# Patient Record
Sex: MALE | Race: WHITE | NOT HISPANIC OR LATINO | Employment: OTHER | URBAN - NONMETROPOLITAN AREA
[De-identification: names, ages, dates, MRNs, and addresses within clinical notes are randomized per-mention and may not be internally consistent; named-entity substitution may affect disease eponyms.]

---

## 2017-01-05 DIAGNOSIS — N18.2 TYPE 2 DIABETES MELLITUS WITH STAGE 2 CHRONIC KIDNEY DISEASE, WITH LONG-TERM CURRENT USE OF INSULIN (H): Primary | ICD-10-CM

## 2017-01-05 DIAGNOSIS — Z79.4 TYPE 2 DIABETES MELLITUS WITH STAGE 2 CHRONIC KIDNEY DISEASE, WITH LONG-TERM CURRENT USE OF INSULIN (H): Primary | ICD-10-CM

## 2017-01-05 DIAGNOSIS — E11.22 TYPE 2 DIABETES MELLITUS WITH STAGE 2 CHRONIC KIDNEY DISEASE, WITH LONG-TERM CURRENT USE OF INSULIN (H): Primary | ICD-10-CM

## 2017-01-05 NOTE — TELEPHONE ENCOUNTER
Gurvinder has stopped by the clinic to let Dr Burleson know that he received a letter from [facility] stating that as of 2017 they will no longer be covering the Humalog Kwik Pen, however they do cover novalog.    He is going to be checking with the pharmacy to see what the price differences are too, but he is wondering what the process is of changing the prescription and if it can be?  He is wondering if there is a difference between the medications that would matter or if they are very comparable.     He has a couple of weeks left of his Humalog, he just didn't want to wait until the last minute.  Thank you,  Carie GRANT

## 2017-01-05 NOTE — TELEPHONE ENCOUNTER
Humalog and NovoLog are essentially interchangeable. There should be no need to change dosing. When it is time for the new prescription, he should simply call the clinic and we can take care of that.    Thank you    Benjy

## 2017-01-06 PROBLEM — N18.2 TYPE 2 DIABETES MELLITUS WITH STAGE 2 CHRONIC KIDNEY DISEASE, WITH LONG-TERM CURRENT USE OF INSULIN (H): Status: ACTIVE | Noted: 2017-01-06

## 2017-01-06 PROBLEM — Z79.4 TYPE 2 DIABETES MELLITUS WITH STAGE 2 CHRONIC KIDNEY DISEASE, WITH LONG-TERM CURRENT USE OF INSULIN (H): Status: ACTIVE | Noted: 2017-01-06

## 2017-01-06 PROBLEM — E11.22 TYPE 2 DIABETES MELLITUS WITH STAGE 2 CHRONIC KIDNEY DISEASE, WITH LONG-TERM CURRENT USE OF INSULIN (H): Status: ACTIVE | Noted: 2017-01-06

## 2017-01-27 DIAGNOSIS — Z79.4 TYPE 2 DIABETES MELLITUS WITH STAGE 2 CHRONIC KIDNEY DISEASE, WITH LONG-TERM CURRENT USE OF INSULIN (H): Primary | ICD-10-CM

## 2017-01-27 DIAGNOSIS — N18.2 TYPE 2 DIABETES MELLITUS WITH STAGE 2 CHRONIC KIDNEY DISEASE, WITH LONG-TERM CURRENT USE OF INSULIN (H): Primary | ICD-10-CM

## 2017-01-27 DIAGNOSIS — E11.22 TYPE 2 DIABETES MELLITUS WITH STAGE 2 CHRONIC KIDNEY DISEASE, WITH LONG-TERM CURRENT USE OF INSULIN (H): Primary | ICD-10-CM

## 2017-01-30 NOTE — TELEPHONE ENCOUNTER
diabetes         Last Written Prescription Date: 12/26/2016  Last Fill Quantity: 15, # refills: 0  Last Office Visit with G, [facility] or [facility] prescribing provider:  12/27/16        BP Readings from Last 3 Encounters:   12/27/16 114/80   04/26/16 138/70   02/02/16 110/66     MICROL       35   5/29/2015  No results found for this basename: microalbumin  CREATININE   Date Value Ref Range Status   04/26/2016 1.02 0.66 - 1.25 mg/dL Final   ]  GFR ESTIMATE   Date Value Ref Range Status   04/26/2016 71 >60 mL/min/1.7m2 Final     Comment:     Non  GFR Calc   05/29/2015 61 >60 mL/min/1.7m2 Final     Comment:     Non  GFR Calc   11/28/2014 77 >60 mL/min/1.7m2 Final     Comment:     Non  GFR Calc     GFR ESTIMATE IF BLACK   Date Value Ref Range Status   04/26/2016 86 >60 mL/min/1.7m2 Final     Comment:      GFR Calc   05/29/2015 73 >60 mL/min/1.7m2 Final     Comment:      GFR Calc   11/28/2014 >90   GFR Calc   >60 mL/min/1.7m2 Final     CHOL      114   4/26/2016  HDL       33   4/26/2016  LDL       46   4/26/2016  LDL       78   3/7/2014  TRIG      174   4/26/2016  CHOLHDLRATIO      3.4   11/28/2014  AST       27   3/7/2014  ALT       45   3/7/2014  A1C      7.1   4/26/2016  A1C      6.5   10/9/2015  A1C      6.9   5/29/2015  A1C      7.8   11/28/2014  A1C      8.0   7/18/2014  POTASSIUM   Date Value Ref Range Status   04/26/2016 4.5 3.4 - 5.3 mmol/L Final

## 2017-02-01 RX ORDER — INSULIN GLARGINE 100 [IU]/ML
INJECTION, SOLUTION SUBCUTANEOUS
Qty: 15 ML | Refills: 5 | Status: SHIPPED | OUTPATIENT
Start: 2017-02-01 | End: 2017-08-05

## 2017-02-01 NOTE — TELEPHONE ENCOUNTER
Corinne wanted to let you know that Pj will be out of insulin tonight. She will be going into town  this afternoon and she was hoping to pick this up. Please call to notify.

## 2017-02-01 NOTE — TELEPHONE ENCOUNTER
Prescription approved per [facility] Refill Protocol............MADDY Bright

## 2017-04-11 ENCOUNTER — ALLIED HEALTH/NURSE VISIT (OUTPATIENT)
Dept: FAMILY MEDICINE | Facility: OTHER | Age: 77
End: 2017-04-11
Payer: COMMERCIAL

## 2017-04-11 ENCOUNTER — TELEPHONE (OUTPATIENT)
Dept: INTERNAL MEDICINE | Facility: CLINIC | Age: 77
End: 2017-04-11

## 2017-04-11 VITALS — SYSTOLIC BLOOD PRESSURE: 128 MMHG | HEART RATE: 72 BPM | DIASTOLIC BLOOD PRESSURE: 68 MMHG

## 2017-04-11 DIAGNOSIS — E11.22 TYPE 2 DIABETES MELLITUS WITH STAGE 2 CHRONIC KIDNEY DISEASE, WITH LONG-TERM CURRENT USE OF INSULIN (H): ICD-10-CM

## 2017-04-11 DIAGNOSIS — N18.2 TYPE 2 DIABETES MELLITUS WITH STAGE 2 CHRONIC KIDNEY DISEASE, WITH LONG-TERM CURRENT USE OF INSULIN (H): ICD-10-CM

## 2017-04-11 DIAGNOSIS — N18.2 TYPE 2 DIABETES MELLITUS WITH STAGE 2 CHRONIC KIDNEY DISEASE, WITH LONG-TERM CURRENT USE OF INSULIN (H): Primary | ICD-10-CM

## 2017-04-11 DIAGNOSIS — Z01.30 BP CHECK: Primary | ICD-10-CM

## 2017-04-11 DIAGNOSIS — E11.22 TYPE 2 DIABETES MELLITUS WITH STAGE 2 CHRONIC KIDNEY DISEASE, WITH LONG-TERM CURRENT USE OF INSULIN (H): Primary | ICD-10-CM

## 2017-04-11 DIAGNOSIS — I25.10 ATHEROSCLEROSIS OF NATIVE CORONARY ARTERY OF NATIVE HEART WITHOUT ANGINA PECTORIS: ICD-10-CM

## 2017-04-11 DIAGNOSIS — Z79.4 TYPE 2 DIABETES MELLITUS WITH STAGE 2 CHRONIC KIDNEY DISEASE, WITH LONG-TERM CURRENT USE OF INSULIN (H): Primary | ICD-10-CM

## 2017-04-11 DIAGNOSIS — I10 HYPERTENSION GOAL BP (BLOOD PRESSURE) < 140/90: ICD-10-CM

## 2017-04-11 DIAGNOSIS — Z79.4 TYPE 2 DIABETES MELLITUS WITH STAGE 2 CHRONIC KIDNEY DISEASE, WITH LONG-TERM CURRENT USE OF INSULIN (H): ICD-10-CM

## 2017-04-11 LAB
ALBUMIN UR-MCNC: ABNORMAL MG/DL
ANION GAP SERPL CALCULATED.3IONS-SCNC: 9 MMOL/L (ref 3–14)
APPEARANCE UR: CLEAR
BILIRUB UR QL STRIP: NEGATIVE
BUN SERPL-MCNC: 21 MG/DL (ref 7–30)
CALCIUM SERPL-MCNC: 9 MG/DL (ref 8.5–10.1)
CHLORIDE SERPL-SCNC: 106 MMOL/L (ref 94–109)
CHOLEST SERPL-MCNC: 113 MG/DL
CO2 SERPL-SCNC: 27 MMOL/L (ref 20–32)
COLOR UR AUTO: YELLOW
CREAT SERPL-MCNC: 1.14 MG/DL (ref 0.66–1.25)
CREAT UR-MCNC: 108 MG/DL
GFR SERPL CREATININE-BSD FRML MDRD: 62 ML/MIN/1.7M2
GLUCOSE SERPL-MCNC: 126 MG/DL (ref 70–99)
GLUCOSE UR STRIP-MCNC: NEGATIVE MG/DL
HBA1C MFR BLD: 6.9 % (ref 4.3–6)
HDLC SERPL-MCNC: 38 MG/DL
HGB UR QL STRIP: NEGATIVE
KETONES UR STRIP-MCNC: NEGATIVE MG/DL
LDLC SERPL CALC-MCNC: 43 MG/DL
LEUKOCYTE ESTERASE UR QL STRIP: NEGATIVE
MICROALBUMIN UR-MCNC: 47 MG/L
MICROALBUMIN/CREAT UR: 43.61 MG/G CR (ref 0–17)
NITRATE UR QL: NEGATIVE
NONHDLC SERPL-MCNC: 75 MG/DL
PH UR STRIP: 6 PH (ref 5–7)
POTASSIUM SERPL-SCNC: 4.3 MMOL/L (ref 3.4–5.3)
RBC #/AREA URNS AUTO: NORMAL /HPF (ref 0–2)
SODIUM SERPL-SCNC: 142 MMOL/L (ref 133–144)
SP GR UR STRIP: 1.02 (ref 1–1.03)
TRIGL SERPL-MCNC: 158 MG/DL
URN SPEC COLLECT METH UR: ABNORMAL
UROBILINOGEN UR STRIP-ACNC: 0.2 EU/DL (ref 0.2–1)
WBC #/AREA URNS AUTO: NORMAL /HPF (ref 0–2)

## 2017-04-11 PROCEDURE — 36415 COLL VENOUS BLD VENIPUNCTURE: CPT | Performed by: INTERNAL MEDICINE

## 2017-04-11 PROCEDURE — 81001 URINALYSIS AUTO W/SCOPE: CPT | Performed by: INTERNAL MEDICINE

## 2017-04-11 PROCEDURE — 99207 ZZC NO CHARGE NURSE ONLY: CPT

## 2017-04-11 PROCEDURE — 83036 HEMOGLOBIN GLYCOSYLATED A1C: CPT | Performed by: INTERNAL MEDICINE

## 2017-04-11 PROCEDURE — 80061 LIPID PANEL: CPT | Performed by: INTERNAL MEDICINE

## 2017-04-11 PROCEDURE — 80048 BASIC METABOLIC PNL TOTAL CA: CPT | Performed by: INTERNAL MEDICINE

## 2017-04-11 PROCEDURE — 82043 UR ALBUMIN QUANTITATIVE: CPT | Performed by: INTERNAL MEDICINE

## 2017-04-11 NOTE — NURSING NOTE
Pj is a 76 year old male who comes in today for a blood pressure check because of ongoing blood pressure monitoring.  Patient is taking medication as prescribed  Patient is tolerating medications well.  Current complaints: none  Patient is not monitoring Blood Pressure elsewhere.      Vitals as recorded, a large cuff was used.  left arm  BP Readings from Last 4 Encounters:   04/11/17 128/68   12/27/16 114/80   04/26/16 138/70   02/02/16 110/66       Health Maintenance Due   Topic Date Due     PNEUMOCOCCAL (2 of 2 - PCV13) 12/05/2007     EYE EXAM Q1 YEAR( NO INBASKET)  03/01/2016     MICROALBUMIN Q1 YEAR( NO INBASKET)  05/29/2016     COLONOSCOPY Q5 YR INBASKET MESSAGE  06/28/2016     A1C Q6 MO( NO INBASKET)  10/26/2016     FOOT EXAM Q1 YEAR( NO INBASKET)  02/03/2017     BMP Q1 YR (NO INBASKET)  04/26/2017     LIPID MONITORING Q1 YEAR( NO INBASKET)  04/26/2017     Ashley Adhikari MA     4/11/2017

## 2017-04-11 NOTE — MR AVS SNAPSHOT
"              After Visit Summary   2017    Pj Zhao    MRN: [id number]           Patient Information     Date Of Birth          1940        Visit Information        Provider Department      2017 9:30 AM KAITLYNN OWENS NURSE, [facility]        Today's Diagnoses     BP check    -  1       Follow-ups after your visit        Who to contact     If you have questions or need follow up information about today's clinic visit or your schedule please contact [facility] directly at [phone].  Normal or non-critical lab and imaging results will be communicated to you by MyChart, letter or phone within 4 business days after the clinic has received the results. If you do not hear from us within 7 days, please contact the clinic through [employer] or phone. If you have a critical or abnormal lab result, we will notify you by phone as soon as possible.  Submit refill requests through [employer] or call your pharmacy and they will forward the refill request to us. Please allow 3 business days for your refill to be completed.          Additional Information About Your Visit        MyChart Information     [employer] lets you send messages to your doctor, view your test results, renew your prescriptions, schedule appointments and more. To sign up, go to [email or web address] . Click on \"Log in\" on the left side of the screen, which will take you to the Welcome page. Then click on \"Sign up Now\" on the right side of the page.     You will be asked to enter the access code listed below, as well as some personal information. Please follow the directions to create your username and password.     Your access code is: [email or web address]  Expires: 7/10/2017 10:09 AM     Your access code will  in 90 days. If you need help or a new code, please call your [facility] or [phone].        Care EveryWhere ID     This is your Care EveryWhere ID. This could be used by other organizations to access " your [facility] medical records  [id number]        Your Vitals Were     Pulse                   72            Blood Pressure from Last 3 Encounters:   04/11/17 128/68   12/27/16 114/80   04/26/16 138/70    Weight from Last 3 Encounters:   12/27/16 228 lb (103.4 kg)   04/26/16 228 lb (103.4 kg)   02/02/16 227 lb (103 kg)              Today, you had the following     No orders found for display       Primary Care Provider Office Phone # Fax #    Porfirio Burleson [phone]       [address]   [facility] [address]        Thank you!     Thank you for choosing [facility]  for your care. Our goal is always to provide you with excellent care. Hearing back from our patients is one way we can continue to improve our services. Please take a few minutes to complete the written survey that you may receive in the mail after your visit with us. Thank you!             Your Updated Medication List - Protect others around you: Learn how to safely use, store and throw away your medicines at www.disposemymeds.org.          This list is accurate as of: 4/11/17 10:09 AM.  Always use your most recent med list.                   Brand Name Dispense Instructions for use    amLODIPine 5 MG tablet    NORVASC    90 tablet    Take 1 tablet (5 mg) by mouth daily       aspirin 325 MG EC tablet      1 tab po QD (Once per day)       atorvastatin 40 MG tablet    LIPITOR    30 tablet    TAKE ONE TABLET BY MOUTH ONCE DAILY       azithromycin 250 MG tablet    ZITHROMAX    6 tablet    Two tablets first day, then one tablet daily for four days.       blood glucose monitoring test strip    ONE TOUCH ULTRA    1 Box    TEST FOUR TIMES DAILY       diazepam 5 MG tablet    VALIUM    30 tablet    Take 1 tablet (5 mg) by mouth 2 times daily as needed for anxiety       gabapentin 300 MG capsule    NEURONTIN    30 capsule    TAKE ONE CAPSULE BY MOUTH AT BEDTIME AS NEEDED FOR NEUROPATHY        "hydrochlorothiazide 25 MG tablet    HYDRODIURIL    90 tablet    Take 1 tablet (25 mg) by mouth daily       IBUPROFEN PO      Take 600 mg by mouth       Injection Device for insulin Aditi    B-D PEN    100 each    BD Pen Needle Short 31g- 5/16\"       insulin aspart 100 UNIT/ML injection    NovoLOG FLEXPEN    30 mL    15 units before breakfast, 18 units before lunch, 18 units before dinner plus use of med dose sliding scale as directed       ketoconazole 2 % cream    NIZORAL    15 g    APPLY TO AFFECTED AREA(S) TOPICALLY AS DIRECTED       LANTUS SOLOSTAR 100 UNIT/ML injection   Generic drug:  insulin glargine     15 mL    INJECT 50 UNITS SUBCUTANEOUSLY ONCE DAILY       lisinopril 20 MG tablet    PRINIVIL/ZESTRIL    60 tablet    Take 1 tablet (20 mg) by mouth 2 times daily       meclizine 25 MG tablet    ANTIVERT    30 tablet    Take 1 tablet (25 mg) by mouth every 6 hours as needed for dizziness       metFORMIN 500 MG tablet    GLUCOPHAGE    360 tablet    TAKE TWO TABLETS BY MOUTH TWO TIMES A DAY WITH MEALS       metoprolol 50 MG tablet    LOPRESSOR    180 tablet    Take 1 tablet (50 mg) by mouth 2 times daily       nitroglycerin 0.4 MG sublingual tablet    NITROSTAT    25 tablet    Place 1 tablet (0.4 mg) under the tongue See Admin Instructions for chest pain         "

## 2017-04-11 NOTE — TELEPHONE ENCOUNTER
Reason for Call: Request for an order or referral:    Order or referral being requested: patient calling to scheduled lab appt, please place orders for diabetic lab work & patient states he's not sure what all else was needed.  Patient is fasting right now is it possible for orders yet this morning?  Please advise patient asap.    Date needed: as soon as possible    Has the patient been seen by the PCP for this problem? YES    Additional comments:     Phone number Patient can be reached at:  Home number on file [phone] (home)    Best Time:      Can we leave a detailed message on this number?  YES    Call taken on 4/11/2017 at 7:52 AM by Amy Linares

## 2017-04-13 ENCOUNTER — TELEPHONE (OUTPATIENT)
Dept: INTERNAL MEDICINE | Facility: CLINIC | Age: 77
End: 2017-04-13

## 2017-04-13 NOTE — LETTER
[address] [address]  Phone: [phone]          April 13, 2017    Pj Zhao  [address] [facility] [address]          Dear Pj,      LAB RESULTS:     The microalbumin has increased slightly.  The urine sample is unremarkable.  Blood sugar was slightly elevated at 126.  The kidney function is excellent.  Cholesterol is well-controlled with an LDL of 43.  The glycosylated hemoglobin is down from 7.1 down to 6.9.  This suggests very good blood sugar control.          Sincerely,      Porfirio Burleson D.O.

## 2017-04-13 NOTE — TELEPHONE ENCOUNTER
----- Message from Porfirio Burleson DO sent at 4/12/2017  8:32 PM CDT -----    Please contact the patient and notify him of the following:  The microalbumin has increased slightly.  The urine sample is unremarkable.  Blood sugar was slightly elevated at 126.  The kidney function is excellent.  Cholesterol is well-controlled with an LDL of 43.  The glycosylated hemoglobin is down from 7.1 down to 6.9.  This suggests very good blood sugar control.        Thank you.  DO VASU PhillipsOI

## 2017-04-13 NOTE — PROGRESS NOTES
Please contact the patient and notify him of the following:  The microalbumin has increased slightly.  The urine sample is unremarkable.  Blood sugar was slightly elevated at 126.  The kidney function is excellent.  Cholesterol is well-controlled with an LDL of 43.  The glycosylated hemoglobin is down from 7.1 down to 6.9.  This suggests very good blood sugar control.        Thank you.  DO CAREN Phillips

## 2017-04-26 DIAGNOSIS — E78.5 HYPERLIPIDEMIA LDL GOAL <100: ICD-10-CM

## 2017-04-26 DIAGNOSIS — N18.2 TYPE 2 DIABETES MELLITUS WITH STAGE 2 CHRONIC KIDNEY DISEASE, WITHOUT LONG-TERM CURRENT USE OF INSULIN (H): ICD-10-CM

## 2017-04-26 DIAGNOSIS — E11.22 TYPE 2 DIABETES MELLITUS WITH STAGE 2 CHRONIC KIDNEY DISEASE, WITHOUT LONG-TERM CURRENT USE OF INSULIN (H): ICD-10-CM

## 2017-04-26 NOTE — TELEPHONE ENCOUNTER
Metformin         Last Written Prescription Date: 12/27/16  Last Fill Quantity: 360, # refills: 0  Last Office Visit with [facility], [facility] or  Health prescribing provider:  12/27/16        BP Readings from Last 3 Encounters:   04/11/17 128/68   12/27/16 114/80   04/26/16 138/70     Lab Results   Component Value Date    MICROL 47 04/11/2017     Lab Results   Component Value Date    UMALCR 43.61 04/11/2017     Creatinine   Date Value Ref Range Status   04/11/2017 1.14 0.66 - 1.25 mg/dL Final   ]  GFR Estimate   Date Value Ref Range Status   04/11/2017 62 >60 mL/min/1.7m2 Final     Comment:     Non  GFR Calc   04/26/2016 71 >60 mL/min/1.7m2 Final     Comment:     Non  GFR Calc   05/29/2015 61 >60 mL/min/1.7m2 Final     Comment:     Non  GFR Calc     GFR Estimate If Black   Date Value Ref Range Status   04/11/2017 75 >60 mL/min/1.7m2 Final     Comment:      GFR Calc   04/26/2016 86 >60 mL/min/1.7m2 Final     Comment:      GFR Calc   05/29/2015 73 >60 mL/min/1.7m2 Final     Comment:      GFR Calc     Lab Results   Component Value Date    CHOL 113 04/11/2017     Lab Results   Component Value Date    HDL 38 04/11/2017     Lab Results   Component Value Date    LDL 43 04/11/2017     Lab Results   Component Value Date    TRIG 158 04/11/2017     Lab Results   Component Value Date    CHOLHDLRATIO 3.4 11/28/2014     Lab Results   Component Value Date    AST 27 03/07/2014     Lab Results   Component Value Date    ALT 45 03/07/2014     Lab Results   Component Value Date    A1C 6.9 04/11/2017    A1C 7.1 04/26/2016    A1C 6.5 10/09/2015    A1C 6.9 05/29/2015    A1C 7.8 11/28/2014     Potassium   Date Value Ref Range Status   04/11/2017 4.3 3.4 - 5.3 mmol/L Final     Atorvastatin      Last Written Prescription Date: 10/21/16  Last Fill Quantity: 30, # refills: 5  Last Office Visit with [facility], [facility] or  Health prescribing provider: 12/27/16       Lab  Results   Component Value Date    CHOL 113 04/11/2017     Lab Results   Component Value Date    HDL 38 04/11/2017     Lab Results   Component Value Date    LDL 43 04/11/2017     Lab Results   Component Value Date    TRIG 158 04/11/2017     Lab Results   Component Value Date    CHOLHDLRATIO 3.4 11/28/2014

## 2017-04-28 RX ORDER — ATORVASTATIN CALCIUM 40 MG/1
TABLET, FILM COATED ORAL
Qty: 30 TABLET | Refills: 5 | Status: SHIPPED | OUTPATIENT
Start: 2017-04-28 | End: 2017-10-23

## 2017-05-13 DIAGNOSIS — E11.22 TYPE 2 DIABETES MELLITUS WITH STAGE 2 CHRONIC KIDNEY DISEASE, WITHOUT LONG-TERM CURRENT USE OF INSULIN (H): ICD-10-CM

## 2017-05-13 DIAGNOSIS — N18.2 TYPE 2 DIABETES MELLITUS WITH STAGE 2 CHRONIC KIDNEY DISEASE, WITHOUT LONG-TERM CURRENT USE OF INSULIN (H): ICD-10-CM

## 2017-05-15 RX ORDER — GABAPENTIN 300 MG/1
CAPSULE ORAL
Qty: 30 CAPSULE | Refills: 5 | Status: SHIPPED | OUTPATIENT
Start: 2017-05-15 | End: 2017-11-08

## 2017-05-15 NOTE — TELEPHONE ENCOUNTER
Gabapentin (NEURONTIN) 300 MG capsule       Last Written Prescription Date:  11/14/2016  Last Fill Quantity: 30,   # refills: 5  Last Office Visit with [facility], [facility] or  Health prescribing provider: 12/27/2016  Future Office visit:       Routing refill request to provider for review/approval because:  Drug not on the [facility], [facility] or [facility] refill protocol or controlled substance    Alia Barnard CMA

## 2017-05-26 ENCOUNTER — HEALTH MAINTENANCE LETTER (OUTPATIENT)
Age: 77
End: 2017-05-26

## 2017-07-05 DIAGNOSIS — N18.1 CHRONIC KIDNEY DISEASE, STAGE I: ICD-10-CM

## 2017-07-05 RX ORDER — LISINOPRIL 20 MG/1
TABLET ORAL
Qty: 60 TABLET | Refills: 5 | Status: SHIPPED | OUTPATIENT
Start: 2017-07-05 | End: 2018-01-02

## 2017-07-05 NOTE — TELEPHONE ENCOUNTER
lisinopril (PRINIVIL/ZESTRIL) 20 MG tablet      Last Written Prescription Date: 12/27/16  Last Fill Quantity: 60, # refills: 5  Last Office Visit with G, P or [facility] prescribing provider: 12/27/16       Potassium   Date Value Ref Range Status   04/11/2017 4.3 3.4 - 5.3 mmol/L Final     Creatinine   Date Value Ref Range Status   04/11/2017 1.14 0.66 - 1.25 mg/dL Final     BP Readings from Last 3 Encounters:   04/11/17 128/68   12/27/16 114/80   04/26/16 138/70

## 2017-07-18 DIAGNOSIS — I10 HYPERTENSION GOAL BP (BLOOD PRESSURE) < 140/90: ICD-10-CM

## 2017-07-18 DIAGNOSIS — N18.2 TYPE 2 DIABETES MELLITUS WITH STAGE 2 CHRONIC KIDNEY DISEASE, WITHOUT LONG-TERM CURRENT USE OF INSULIN (H): ICD-10-CM

## 2017-07-18 DIAGNOSIS — E11.22 TYPE 2 DIABETES MELLITUS WITH STAGE 2 CHRONIC KIDNEY DISEASE, WITHOUT LONG-TERM CURRENT USE OF INSULIN (H): ICD-10-CM

## 2017-07-18 RX ORDER — METOPROLOL TARTRATE 50 MG
TABLET ORAL
Qty: 180 TABLET | Refills: 1 | Status: SHIPPED | OUTPATIENT
Start: 2017-07-18 | End: 2018-01-19

## 2017-07-18 NOTE — TELEPHONE ENCOUNTER
metoprolol (LOPRESSOR) 50 MG tablet      Last Written Prescription Date: 12/27/16  Last Fill Quantity: 180, # refills: 1    Last Office Visit with G, [facility] or [facility] prescribing provider:  12/27/16   Future Office Visit:        BP Readings from Last 3 Encounters:   04/11/17 128/68   12/27/16 114/80   04/26/16 138/70

## 2017-07-18 NOTE — TELEPHONE ENCOUNTER
metformin         Last Written Prescription Date: 4/28/17  Last Fill Quantity: 360, # refills: 0  Last Office Visit with [facility], [facility] or [facility] prescribing provider:  12/27/16        BP Readings from Last 3 Encounters:   04/11/17 128/68   12/27/16 114/80   04/26/16 138/70     Lab Results   Component Value Date    MICROL 47 04/11/2017     Lab Results   Component Value Date    UMALCR 43.61 04/11/2017     Creatinine   Date Value Ref Range Status   04/11/2017 1.14 0.66 - 1.25 mg/dL Final   ]  GFR Estimate   Date Value Ref Range Status   04/11/2017 62 >60 mL/min/1.7m2 Final     Comment:     Non  GFR Calc   04/26/2016 71 >60 mL/min/1.7m2 Final     Comment:     Non  GFR Calc   05/29/2015 61 >60 mL/min/1.7m2 Final     Comment:     Non  GFR Calc     GFR Estimate If Black   Date Value Ref Range Status   04/11/2017 75 >60 mL/min/1.7m2 Final     Comment:      GFR Calc   04/26/2016 86 >60 mL/min/1.7m2 Final     Comment:      GFR Calc   05/29/2015 73 >60 mL/min/1.7m2 Final     Comment:      GFR Calc     Lab Results   Component Value Date    CHOL 113 04/11/2017     Lab Results   Component Value Date    HDL 38 04/11/2017     Lab Results   Component Value Date    LDL 43 04/11/2017     Lab Results   Component Value Date    TRIG 158 04/11/2017     Lab Results   Component Value Date    CHOLHDLRATIO 3.4 11/28/2014     Lab Results   Component Value Date    AST 27 03/07/2014     Lab Results   Component Value Date    ALT 45 03/07/2014     Lab Results   Component Value Date    A1C 6.9 04/11/2017    A1C 7.1 04/26/2016    A1C 6.5 10/09/2015    A1C 6.9 05/29/2015    A1C 7.8 11/28/2014     Potassium   Date Value Ref Range Status   04/11/2017 4.3 3.4 - 5.3 mmol/L Final

## 2017-07-18 NOTE — TELEPHONE ENCOUNTER
Prescription approved per [facility] Refill Protocol.    Justyna Weaver RN

## 2017-07-22 DIAGNOSIS — B35.4 TINEA CORPORIS: ICD-10-CM

## 2017-07-24 NOTE — TELEPHONE ENCOUNTER
ketoconazole (NIZORAL) 2 % cream      Last Written Prescription Date: 9/20/16  Last Fill Quantity: 15,  # refills: 0   Last Office Visit with [facility], [facility] or [facility] prescribing provider: 12/27/16

## 2017-07-25 RX ORDER — KETOCONAZOLE 20 MG/G
CREAM TOPICAL
Qty: 15 G | Refills: 0 | Status: SHIPPED | OUTPATIENT
Start: 2017-07-25 | End: 2020-10-30

## 2017-07-25 NOTE — TELEPHONE ENCOUNTER
[facility]  Routing refill request to provider for review/approval because:  A break in medication    Hilario Canchola RN, BSN

## 2017-09-20 ENCOUNTER — TELEPHONE (OUTPATIENT)
Dept: FAMILY MEDICINE | Facility: CLINIC | Age: 77
End: 2017-09-20

## 2017-09-20 ENCOUNTER — OFFICE VISIT (OUTPATIENT)
Dept: FAMILY MEDICINE | Facility: OTHER | Age: 77
End: 2017-09-20
Payer: COMMERCIAL

## 2017-09-20 VITALS
HEART RATE: 77 BPM | WEIGHT: 228.9 LBS | SYSTOLIC BLOOD PRESSURE: 112 MMHG | DIASTOLIC BLOOD PRESSURE: 62 MMHG | TEMPERATURE: 97.7 F | BODY MASS INDEX: 33.32 KG/M2 | OXYGEN SATURATION: 96 %

## 2017-09-20 DIAGNOSIS — Z79.4 TYPE 2 DIABETES MELLITUS WITH STAGE 2 CHRONIC KIDNEY DISEASE, WITH LONG-TERM CURRENT USE OF INSULIN (H): ICD-10-CM

## 2017-09-20 DIAGNOSIS — E66.811 OBESITY, CLASS I, BMI 30-34.9: ICD-10-CM

## 2017-09-20 DIAGNOSIS — J01.00 ACUTE NON-RECURRENT MAXILLARY SINUSITIS: ICD-10-CM

## 2017-09-20 DIAGNOSIS — N18.2 TYPE 2 DIABETES MELLITUS WITH STAGE 2 CHRONIC KIDNEY DISEASE, WITH LONG-TERM CURRENT USE OF INSULIN (H): ICD-10-CM

## 2017-09-20 DIAGNOSIS — Z12.11 SPECIAL SCREENING FOR MALIGNANT NEOPLASMS, COLON: Primary | ICD-10-CM

## 2017-09-20 DIAGNOSIS — E11.22 TYPE 2 DIABETES MELLITUS WITH STAGE 2 CHRONIC KIDNEY DISEASE, WITH LONG-TERM CURRENT USE OF INSULIN (H): ICD-10-CM

## 2017-09-20 DIAGNOSIS — H66.003 ACUTE SUPPURATIVE OTITIS MEDIA OF BOTH EARS WITHOUT SPONTANEOUS RUPTURE OF TYMPANIC MEMBRANES, RECURRENCE NOT SPECIFIED: ICD-10-CM

## 2017-09-20 PROCEDURE — 99214 OFFICE O/P EST MOD 30 MIN: CPT | Performed by: INTERNAL MEDICINE

## 2017-09-20 RX ORDER — LORATADINE 10 MG/1
10 TABLET ORAL DAILY
Qty: 30 TABLET | Refills: 1 | Status: SHIPPED | OUTPATIENT
Start: 2017-09-20 | End: 2018-01-30

## 2017-09-20 RX ORDER — AZITHROMYCIN 250 MG/1
TABLET, FILM COATED ORAL
Qty: 6 TABLET | Refills: 0 | Status: SHIPPED | OUTPATIENT
Start: 2017-09-20 | End: 2018-01-30

## 2017-09-20 ASSESSMENT — PAIN SCALES - GENERAL: PAINLEVEL: NO PAIN (0)

## 2017-09-20 NOTE — TELEPHONE ENCOUNTER
I left a message for Gurvinder Zhao.  If he calls back, I am at ext [phone].  If I am not available, he is looking for the following information:    Dermatology  Dr. Piotr Rossi  [address] [place] [address]   Phone [phone]

## 2017-09-20 NOTE — NURSING NOTE
"Chief Complaint   Patient presents with     URI       Initial /62 (BP Location: Left arm, Patient Position: Chair, Cuff Size: Adult Large)  Pulse 77  Temp 97.7  F (36.5  C) (Temporal)  Wt 228 lb 14.4 oz (103.8 kg)  SpO2 96%  BMI 33.32 kg/m2 Estimated body mass index is 33.32 kg/(m^2) as calculated from the following:    Height as of 2/2/16: 5' 9.5\" (1.765 m).    Weight as of this encounter: 228 lb 14.4 oz (103.8 kg).  Medication Reconciliation: complete  Health Maintenance reviewed at today's visit patient asked to schedule/complete:   Colon Cancer:  Patient agrees to schedule   Karli NOLASCO      "

## 2017-09-20 NOTE — PROGRESS NOTES
SUBJECTIVE:   Pj Zhao is a 76 year old male who presents to clinic today for the following health issues:    RESPIRATORY SYMPTOMS      Duration: 1 week    Description  nasal congestion, sore throat and cough    Severity: moderate    Accompanying signs and symptoms: None    History (predisposing factors):  He was in the rain    Precipitating or alleviating factors: None    Therapies tried and outcome:  none    CHIEF COMPLAINT:    The patient is a pleasant 76-year-old gentleman who presents today with nasal congestion, posterior nasal drainage, and a slight cough. He's had this now for about a week. No one else in the household have similar symptoms. He notes that he was out in the rain and in the wind during a tractor pull about a week ago and this is when this all began. He does have a history of type 2 diabetes which has been well-controlled. He is compliant with both his diet and his medication. He is somewhat obese and is working on weight loss as best that he can. He does have a history of hypertension which is currently controlled. He does have some discomfort in his ears, he has some facial pressure primarily in the maxillary area and has posterior nasal drainage which is causing him to have some discomfort in the throat as well as an occasional nonproductive cough.                         PAST, FAMILY,SOCIAL HISTORY:     Medical  History:   has a past medical history of CKD (chronic kidney disease) stage 1, GFR 90 ml/min or greater; CKD (chronic kidney disease) stage 2, GFR 60-89 ml/min (10/26/2015); Diabetic eye exam (H) (10/19/13, 11/25/14); Impotence of organic origin; Microalbuminuria (3/7/2011); NONSPECIFIC MEDICAL HISTORY (1977); Obesity, Class I, BMI 30-34.9 (10/26/2015); Other specified disease of nail; 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) (10/31/2010); Type II or unspecified type diabetes mellitus without mention of complication, not stated as uncontrolled;  Unspecified essential hypertension; and Urinary calculus, unspecified (1990).     Surgical History:   has a past surgical history that includes NONSPECIFIC PROCEDURE; REMOVAL OF TONSILS,<13 Y/O; NONSPECIFIC PROCEDURE (3/11/2003); APPENDECTOMY (3/11/2003); colonoscopy (03/06/07); Colonoscopy (6/28/2011); Phacoemulsification with standard intraocular lens implant (Right, 1/15/2015); and Phacoemulsification with standard intraocular lens implant (Left, 2/19/2015).     Social History:   reports that he has never smoked. He has never used smokeless tobacco. He reports that he does not drink alcohol or use illicit drugs.     Family History:  family history includes CANCER in his sister; DIABETES in his mother; Obesity in his mother.            MEDICATIONS  Current Outpatient Prescriptions   Medication Sig Dispense Refill     azithromycin (ZITHROMAX) 250 MG tablet Two tablets first day, then one tablet daily for four days. 6 tablet 0     loratadine (CLARITIN) 10 MG tablet Take 1 tablet (10 mg) by mouth daily 30 tablet 1     LANTUS SOLOSTAR 100 UNIT/ML soln INJECT 50 UNITS SUBCUTANEOUSLY ONCE DAILY 15 mL 3     ketoconazole (NIZORAL) 2 % cream APPLY TO AFFECTED AREA(S) TOPICALLY AS DIRECTED 15 g 0     metFORMIN (GLUCOPHAGE) 500 MG tablet TAKE TWO TABLETS BY MOUTH TWICE A  tablet 0     metoprolol (LOPRESSOR) 50 MG tablet TAKE ONE TABLET BY MOUTH TWICE A  tablet 1     lisinopril (PRINIVIL/ZESTRIL) 20 MG tablet TAKE ONE TABLET BY MOUTH TWICE A DAY 60 tablet 5     ONE TOUCH ULTRA test strip USE TO FOUR TIMES A  each 11     gabapentin (NEURONTIN) 300 MG capsule TAKE ONE CAPSULE BY MOUTH AT BEDTIME AS NEEDED FOR NEUROPATHY 30 capsule 5     atorvastatin (LIPITOR) 40 MG tablet TAKE ONE TABLET BY MOUTH ONCE DAILY 30 tablet 5     insulin aspart (NOVOLOG FLEXPEN) 100 UNIT/ML injection 15 units before breakfast, 18 units before lunch, 18 units before dinner plus use of med dose sliding scale as directed 30 mL 3      "hydrochlorothiazide (HYDRODIURIL) 25 MG tablet Take 1 tablet (25 mg) by mouth daily 90 tablet 2     amLODIPine (NORVASC) 5 MG tablet Take 1 tablet (5 mg) by mouth daily 90 tablet 2     Injection Device for insulin (B-D PEN) BREANN BD Pen Needle Short 31g- 5/16\" 100 each 8     meclizine (ANTIVERT) 25 MG tablet Take 1 tablet (25 mg) by mouth every 6 hours as needed for dizziness 30 tablet 1     nitroglycerin (NITROSTAT) 0.4 MG SL tablet Place 1 tablet (0.4 mg) under the tongue See Admin Instructions for chest pain 25 tablet 11     IBUPROFEN PO Take 600 mg by mouth       ASPIRIN 325 MG OR TBEC 1 tab po QD (Once per day)           --------------------------------------------------------------------------------------------------------------------                          REVIEW OF SYSTEMS:         LUNGS: Pt denies: Productive cough,excess sputum, hemoptysis, or shortness of breath. Does have occasional dry cough.   HEART: Pt denies: chest pain, arrythmia, syncope, tachy or bradyarrhythmia or excess edema.   GI: Pt denies: nausea, vomitting, diarrhea, constipation, melena, or hematochezia.   NEURO: Pt denies: seizures, strokes, diplopia, weakness, paraesthesias, or paralysis.   SKIN: Pt denies: itching, rashes, discoloration, or specific lesions of concern. Denies recent hair loss.                          EXAMINATION:         /62 (BP Location: Left arm, Patient Position: Chair, Cuff Size: Adult Large)  Pulse 77  Temp 97.7  F (36.5  C) (Temporal)  Wt 228 lb 14.4 oz (103.8 kg)  SpO2 96%  BMI 33.32 kg/m2   Constitutional: The patient appears to be in mild acute distress. The patient appears to be adequately hydrated. No acute respiratory or hemodynamic distress is noted at this time.   LUNGS: clear bilaterally, airflow is brisk, no intercostal retraction or stridor is noted. No coughing is noted during visit.   HEART:  regular without rubs, clicks, gallops, or murmurs. PMI is nondisplaced. Upstrokes are brisk. " S1,S2 are heard.   GI: Abdomen is soft, without rebound, guarding or tenderness. Bowel sounds are appropriate. No renal bruits are heard. Abdomen is obese.   SKIN:  warm and dry. No erythema, or rashes are noted. No specific lesions of concern are noted.   He does have a small area of eczema about the size of a nickel to the left of his umbilical area.              ENT: Nasal mucosa is edematous, scattered yellow exudates are seen. Incomplete obstruction is present. Pharynx is clear of exudates, no significant cervical adenopathy is present. Tympanic membranes show retraction and redness consistent with infection but no evidence of perforation. Thyroid is not nodular or enlarged.                        DECISION MAKIN. Acute suppurative otitis media of both ears without spontaneous rupture of tympanic membranes, recurrence not specified  Tylenol, rest, medications as listed  - azithromycin (ZITHROMAX) 250 MG tablet; Two tablets first day, then one tablet daily for four days.  Dispense: 6 tablet; Refill: 0  - loratadine (CLARITIN) 10 MG tablet; Take 1 tablet (10 mg) by mouth daily  Dispense: 30 tablet; Refill: 1    2. Acute non-recurrent maxillary sinusitis  As above  - azithromycin (ZITHROMAX) 250 MG tablet; Two tablets first day, then one tablet daily for four days.  Dispense: 6 tablet; Refill: 0  - loratadine (CLARITIN) 10 MG tablet; Take 1 tablet (10 mg) by mouth daily  Dispense: 30 tablet; Refill: 1    3. Special screening for malignant neoplasms, colon  Patient will contact and set up at his leisure.  - GASTROENTEROLOGY ADULT REF PROCEDURE ONLY    4. Type 2 diabetes mellitus with stage 2 chronic kidney disease, with long-term current use of insulin (H)  Check fasting blood work in the next couple months    5. Obesity, Class I, BMI 30-34.9  Discussed weight loss her caloric restriction and exercise as tolerated                               FOLLOW UP    I have asked the patient to make an appointment for  follow up with me in a couple months        I have carefully explained the diagnosis and treatment options with the patient. The patient has displayed an understanding of the above, and all subsequent questions were answered.         DO CAREN Phillips    Portions of this note were produced using [employer]  Although every attempt at real-time proof reading has been made, occasional grammar/syntax errors may have been missed.

## 2017-09-20 NOTE — MR AVS SNAPSHOT
After Visit Summary   9/20/2017    Pj Zhao    MRN: [id number]           Patient Information     Date Of Birth          1940        Visit Information        Provider Department      9/20/2017 7:20 AM Porfirio Burleson DO [facility]        Today's Diagnoses     Special screening for malignant neoplasms, colon    -  1    Acute suppurative otitis media of both ears without spontaneous rupture of tympanic membranes, recurrence not specified        Acute non-recurrent maxillary sinusitis        Type 2 diabetes mellitus with stage 2 chronic kidney disease, with long-term current use of insulin (H)        Obesity, Class I, BMI 30-34.9           Follow-ups after your visit        Additional Services     GASTROENTEROLOGY ADULT REF PROCEDURE ONLY       Last Lab Result: Creatinine (mg/dL)       Date                     Value                 04/11/2017               1.14             ----------  Body mass index is 33.32 kg/(m^2).     Needed:  No  Language:  English    Patient will be contacted to schedule procedure.     Please be aware that coverage of these services is subject to the terms and limitations of your health insurance plan.  Call member services at your health plan with any benefit or coverage questions.  Any procedures must be performed at a [facility] facility OR coordinated by your clinic's referral office.    Please bring the following with you to your appointment:    (1) Any X-Rays, CTs or MRIs which have been performed.  Contact the facility where they were done to arrange for  prior to your scheduled appointment.    (2) List of current medications   (3) This referral request   (4) Any documents/labs given to you for this referral                  Who to contact     If you have questions or need follow up information about today's clinic visit or your schedule please contact [facility] directly at [phone].  Normal or  "non-critical lab and imaging results will be communicated to you by MyChart, letter or phone within 4 business days after the clinic has received the results. If you do not hear from us within 7 days, please contact the clinic through [employer] or phone. If you have a critical or abnormal lab result, we will notify you by phone as soon as possible.  Submit refill requests through [employer] or call your pharmacy and they will forward the refill request to us. Please allow 3 business days for your refill to be completed.          Additional Information About Your Visit        [employer] Information     [employer] lets you send messages to your doctor, view your test results, renew your prescriptions, schedule appointments and more. To sign up, go to [email or web address] . Click on \"Log in\" on the left side of the screen, which will take you to the Welcome page. Then click on \"Sign up Now\" on the right side of the page.     You will be asked to enter the access code listed below, as well as some personal information. Please follow the directions to create your username and password.     Your access code is: [email or web address]  Expires: 2017  7:56 AM     Your access code will  in 90 days. If you need help or a new code, please call your [facility] clinic or [phone].        Care EveryWhere ID     This is your Care EveryWhere ID. This could be used by other organizations to access your [facility] medical records  [id number]        Your Vitals Were     Pulse Temperature Pulse Oximetry BMI (Body Mass Index)          77 97.7  F (36.5  C) (Temporal) 96% 33.32 kg/m2         Blood Pressure from Last 3 Encounters:   17 112/62   17 128/68   16 114/80    Weight from Last 3 Encounters:   17 228 lb 14.4 oz (103.8 kg)   16 228 lb (103.4 kg)   16 228 lb (103.4 kg)              We Performed the Following     GASTROENTEROLOGY ADULT REF PROCEDURE ONLY          Today's Medication Changes        "   These changes are accurate as of: 9/20/17  7:56 AM.  If you have any questions, ask your nurse or doctor.               Start taking these medicines.        Dose/Directions    azithromycin 250 MG tablet   Commonly known as:  ZITHROMAX   Used for:  Acute suppurative otitis media of both ears without spontaneous rupture of tympanic membranes, recurrence not specified, Acute non-recurrent maxillary sinusitis   Started by:  Porfirio Burleson DO        Two tablets first day, then one tablet daily for four days.   Quantity:  6 tablet   Refills:  0       loratadine 10 MG tablet   Commonly known as:  CLARITIN   Used for:  Acute suppurative otitis media of both ears without spontaneous rupture of tympanic membranes, recurrence not specified, Acute non-recurrent maxillary sinusitis   Started by:  Porfirio Burleson DO        Dose:  10 mg   Take 1 tablet (10 mg) by mouth daily   Quantity:  30 tablet   Refills:  1            Where to get your medicines      These medications were sent to [address]  [address], [facility] [address]     Phone:  [phone]     azithromycin 250 MG tablet    loratadine 10 MG tablet                Primary Care Provider Office Phone # Fax #    Porfiriovioletta Burleson [phone] [address] [facility]   [facility] [address]        Equal Access to Services     MARSHALL HOPE AH: Hadii jaleesa mello hadasho Soomaali, waaxda luqadaha, qaybta kaalmada adeegyada, waxay preston monk. So [facility] [phone].    ATENCIÓN: Si habla español, tiene a zelaya disposición servicios gratuitos de asistencia lingüística. Llame al [phone].    We comply with applicable federal civil rights laws and [place] laws. We do not discriminate on the basis of race, color, national origin, age, disability sex, sexual orientation or gender identity.            Thank you!     Thank you for choosing [facility]  for your care. Our  "goal is always to provide you with excellent care. Hearing back from our patients is one way we can continue to improve our services. Please take a few minutes to complete the written survey that you may receive in the mail after your visit with us. Thank you!             Your Updated Medication List - Protect others around you: Learn how to safely use, store and throw away your medicines at www.disposemymeds.org.          This list is accurate as of: 9/20/17  7:56 AM.  Always use your most recent med list.                   Brand Name Dispense Instructions for use Diagnosis    amLODIPine 5 MG tablet    NORVASC    90 tablet    Take 1 tablet (5 mg) by mouth daily    Hypertension goal BP (blood pressure) < 140/90       aspirin 325 MG EC tablet      1 tab po QD (Once per day)        atorvastatin 40 MG tablet    LIPITOR    30 tablet    TAKE ONE TABLET BY MOUTH ONCE DAILY    Hyperlipidemia LDL goal <100       azithromycin 250 MG tablet    ZITHROMAX    6 tablet    Two tablets first day, then one tablet daily for four days.    Acute suppurative otitis media of both ears without spontaneous rupture of tympanic membranes, recurrence not specified, Acute non-recurrent maxillary sinusitis       gabapentin 300 MG capsule    NEURONTIN    30 capsule    TAKE ONE CAPSULE BY MOUTH AT BEDTIME AS NEEDED FOR NEUROPATHY    Type 2 diabetes mellitus with stage 2 chronic kidney disease, without long-term current use of insulin (H)       hydrochlorothiazide 25 MG tablet    HYDRODIURIL    90 tablet    Take 1 tablet (25 mg) by mouth daily    Hypertension goal BP (blood pressure) < 140/90       IBUPROFEN PO      Take 600 mg by mouth        Injection Device for insulin Aditi    B-D PEN    100 each    BD Pen Needle Short 31g- 5/16\"    Type 2 diabetes, HbA1C goal < 8% (H)       insulin aspart 100 UNIT/ML injection    NovoLOG FLEXPEN    30 mL    15 units before breakfast, 18 units before lunch, 18 units before dinner plus use of med dose sliding " scale as directed    Type 2 diabetes mellitus with stage 2 chronic kidney disease, with long-term current use of insulin (H)       ketoconazole 2 % cream    NIZORAL    15 g    APPLY TO AFFECTED AREA(S) TOPICALLY AS DIRECTED    Tinea corporis       LANTUS SOLOSTAR 100 UNIT/ML injection   Generic drug:  insulin glargine     15 mL    INJECT 50 UNITS SUBCUTANEOUSLY ONCE DAILY    Type 2 diabetes mellitus with stage 2 chronic kidney disease, with long-term current use of insulin (H)       lisinopril 20 MG tablet    PRINIVIL/ZESTRIL    60 tablet    TAKE ONE TABLET BY MOUTH TWICE A DAY    Chronic kidney disease, stage I       loratadine 10 MG tablet    CLARITIN    30 tablet    Take 1 tablet (10 mg) by mouth daily    Acute suppurative otitis media of both ears without spontaneous rupture of tympanic membranes, recurrence not specified, Acute non-recurrent maxillary sinusitis       meclizine 25 MG tablet    ANTIVERT    30 tablet    Take 1 tablet (25 mg) by mouth every 6 hours as needed for dizziness    BPPV (benign paroxysmal positional vertigo), bilateral       metFORMIN 500 MG tablet    GLUCOPHAGE    360 tablet    TAKE TWO TABLETS BY MOUTH TWICE A DAY    Type 2 diabetes mellitus with stage 2 chronic kidney disease, without long-term current use of insulin (H)       metoprolol 50 MG tablet    LOPRESSOR    180 tablet    TAKE ONE TABLET BY MOUTH TWICE A DAY    Hypertension goal BP (blood pressure) < 140/90       nitroGLYcerin 0.4 MG sublingual tablet    NITROSTAT    25 tablet    Place 1 tablet (0.4 mg) under the tongue See Admin Instructions for chest pain    Chronic ischemic heart disease, unspecified       ONE TOUCH ULTRA test strip   Generic drug:  blood glucose monitoring     100 each    USE TO FOUR TIMES A DAY    Type 2 diabetes mellitus with hyperglycemia (H)

## 2017-09-21 ENCOUNTER — TELEPHONE (OUTPATIENT)
Dept: INTERNAL MEDICINE | Facility: CLINIC | Age: 77
End: 2017-09-21

## 2017-09-21 NOTE — TELEPHONE ENCOUNTER
Left message for patient to return call to schedule colonoscopy or EGD. If Nadege or Marli are unavailable, please transfer to the surgery center.     OK to schedule with JAMES or Won

## 2017-09-21 NOTE — LETTER
Dear Pj,        At [facility] we care about your health and are committed to providing quality patient care, which includes staying current on preventive cancer screenings.  You can increase your chances of finding and treating cancers through regular screenings.     Our records indicate you may be due for the following preventive screening(s):    Colonoscopy    Colonoscopy is recommended every ten years for everyone age 50 and older. We strongly urge our patient's to consider having a colonoscopy done, which is the best screening test available and only needs to be done every 10 years if results are normal. If you are unwilling or unable to have a colonoscopy then we recommend the annual stool testing for blood. This test is called a FIT test and it looks for blood in the stool.       To schedule your colonoscopy, you may contact us by phone at [phone]    If you have had any of the screenings listed above at another facility, please call us so that we may update your chart.          Your [facility] Care Team

## 2017-09-25 DIAGNOSIS — E11.22 TYPE 2 DIABETES MELLITUS WITH STAGE 2 CHRONIC KIDNEY DISEASE, WITH LONG-TERM CURRENT USE OF INSULIN (H): ICD-10-CM

## 2017-09-25 DIAGNOSIS — N18.2 TYPE 2 DIABETES MELLITUS WITH STAGE 2 CHRONIC KIDNEY DISEASE, WITH LONG-TERM CURRENT USE OF INSULIN (H): ICD-10-CM

## 2017-09-25 DIAGNOSIS — Z79.4 TYPE 2 DIABETES MELLITUS WITH STAGE 2 CHRONIC KIDNEY DISEASE, WITH LONG-TERM CURRENT USE OF INSULIN (H): ICD-10-CM

## 2017-09-25 RX ORDER — INSULIN ASPART 100 [IU]/ML
INJECTION, SOLUTION INTRAVENOUS; SUBCUTANEOUS
Qty: 30 ML | Refills: 3 | Status: SHIPPED | OUTPATIENT
Start: 2017-09-25 | End: 2018-06-20

## 2017-09-25 NOTE — TELEPHONE ENCOUNTER
Routing refill request to provider for review/approval because:  Labs out of range:  Microalbumin    Keisha Avendaño, RN  [facility]

## 2017-09-25 NOTE — TELEPHONE ENCOUNTER
Novolog         Last Written Prescription Date: 1/6/17  Last Fill Quantity: 30 mL, # refills: 3  Last Office Visit with [facility] or [facility] prescribing provider:  9/20/17        BP Readings from Last 3 Encounters:   09/20/17 112/62   04/11/17 128/68   12/27/16 114/80     Lab Results   Component Value Date    MICROL 47 04/11/2017     Lab Results   Component Value Date    UMALCR 43.61 04/11/2017     Creatinine   Date Value Ref Range Status   04/11/2017 1.14 0.66 - 1.25 mg/dL Final   ]  GFR Estimate   Date Value Ref Range Status   04/11/2017 62 >60 mL/min/1.7m2 Final     Comment:     Non  GFR Calc   04/26/2016 71 >60 mL/min/1.7m2 Final     Comment:     Non  GFR Calc   05/29/2015 61 >60 mL/min/1.7m2 Final     Comment:     Non  GFR Calc     GFR Estimate If Black   Date Value Ref Range Status   04/11/2017 75 >60 mL/min/1.7m2 Final     Comment:      GFR Calc   04/26/2016 86 >60 mL/min/1.7m2 Final     Comment:      GFR Calc   05/29/2015 73 >60 mL/min/1.7m2 Final     Comment:      GFR Calc     Lab Results   Component Value Date    CHOL 113 04/11/2017     Lab Results   Component Value Date    HDL 38 04/11/2017     Lab Results   Component Value Date    LDL 43 04/11/2017     Lab Results   Component Value Date    TRIG 158 04/11/2017     Lab Results   Component Value Date    CHOLHDLRATIO 3.4 11/28/2014     Lab Results   Component Value Date    AST 27 03/07/2014     Lab Results   Component Value Date    ALT 45 03/07/2014     Lab Results   Component Value Date    A1C 6.9 04/11/2017    A1C 7.1 04/26/2016    A1C 6.5 10/09/2015    A1C 6.9 05/29/2015    A1C 7.8 11/28/2014     Potassium   Date Value Ref Range Status   04/11/2017 4.3 3.4 - 5.3 mmol/L Final

## 2017-09-27 NOTE — TELEPHONE ENCOUNTER
Left message for patient to return call to schedule EGD/colonoscopy. If Marli or Nadege are not available, please transfer to same day surgery        X3, letter sent

## 2017-10-04 NOTE — TELEPHONE ENCOUNTER
Patient called back and info was given.  Thank you,  Chelo Villaseñor   for [facility]

## 2017-10-23 DIAGNOSIS — E78.5 HYPERLIPIDEMIA LDL GOAL <100: ICD-10-CM

## 2017-10-24 RX ORDER — ATORVASTATIN CALCIUM 40 MG/1
TABLET, FILM COATED ORAL
Qty: 30 TABLET | Refills: 5 | Status: SHIPPED | OUTPATIENT
Start: 2017-10-24 | End: 2018-04-19

## 2017-10-24 NOTE — TELEPHONE ENCOUNTER
Routing refill request to provider for review/approval because:  Labs out of range:  BALJIT Avendaño RN  [facility]

## 2017-11-08 DIAGNOSIS — N18.2 TYPE 2 DIABETES MELLITUS WITH STAGE 2 CHRONIC KIDNEY DISEASE, WITHOUT LONG-TERM CURRENT USE OF INSULIN (H): ICD-10-CM

## 2017-11-08 DIAGNOSIS — E11.22 TYPE 2 DIABETES MELLITUS WITH STAGE 2 CHRONIC KIDNEY DISEASE, WITHOUT LONG-TERM CURRENT USE OF INSULIN (H): ICD-10-CM

## 2017-11-08 DIAGNOSIS — I10 HYPERTENSION GOAL BP (BLOOD PRESSURE) < 140/90: ICD-10-CM

## 2017-11-08 RX ORDER — GABAPENTIN 300 MG/1
CAPSULE ORAL
Qty: 30 CAPSULE | Refills: 5 | Status: SHIPPED | OUTPATIENT
Start: 2017-11-08 | End: 2018-05-14

## 2017-11-08 RX ORDER — HYDROCHLOROTHIAZIDE 25 MG/1
TABLET ORAL
Qty: 90 TABLET | Refills: 1 | Status: SHIPPED | OUTPATIENT
Start: 2017-11-08 | End: 2019-01-11

## 2017-11-08 NOTE — TELEPHONE ENCOUNTER
Gabapentin  Routing refill request to provider for review/approval because:  Drug not on the [facility] refill protocol     HCTZ  Prescription approved per [facility] Refill Protocol.    Keisha Avendaño RN  [facility]

## 2017-11-08 NOTE — TELEPHONE ENCOUNTER
Requested Prescriptions   Pending Prescriptions Disp Refills     gabapentin (NEURONTIN) 300 MG capsule [Pharmacy Med Name: GABAPENTIN 300MG CAPS] 30 capsule 5     Sig: TAKE ONE CAPSULE BY MOUTH AT BEDTIME AS NEEDED FOR NEUROPATHY    There is no refill protocol information for this order        hydrochlorothiazide (HYDRODIURIL) 25 MG tablet [Pharmacy Med Name: HYDROCHLOROTHIAZIDE 25MG TABS] 90 tablet 2     Sig: TAKE ONE TABLET BY MOUTH ONCE DAILY    Diuretics (Including Combos) Protocol Passed    11/8/2017  9:39 AM       Passed - Blood pressure under 140/90    BP Readings from Last 3 Encounters:   09/20/17 112/62   04/11/17 128/68   12/27/16 114/80                Passed - Recent or future visit with authorizing provider's specialty    Patient had office visit in the last year or has a visit in the next 30 days with authorizing provider.  See chart review.              Passed - Patient is age 18 or older       Passed - Normal serum creatinine on file in past 12 months    Recent Labs   Lab Test  04/11/17   0909   CR  1.14             Passed - Normal serum potassium on file in past 12 months    Recent Labs   Lab Test  04/11/17   0909   POTASSIUM  4.3                   Passed - Normal serum sodium on file in past 12 months    Recent Labs   Lab Test  04/11/17   0909   NA  142

## 2017-12-10 DIAGNOSIS — Z79.4 TYPE 2 DIABETES MELLITUS WITH STAGE 2 CHRONIC KIDNEY DISEASE, WITH LONG-TERM CURRENT USE OF INSULIN (H): ICD-10-CM

## 2017-12-10 DIAGNOSIS — N18.2 TYPE 2 DIABETES MELLITUS WITH STAGE 2 CHRONIC KIDNEY DISEASE, WITH LONG-TERM CURRENT USE OF INSULIN (H): ICD-10-CM

## 2017-12-10 DIAGNOSIS — E11.22 TYPE 2 DIABETES MELLITUS WITH STAGE 2 CHRONIC KIDNEY DISEASE, WITH LONG-TERM CURRENT USE OF INSULIN (H): ICD-10-CM

## 2017-12-11 NOTE — TELEPHONE ENCOUNTER
Requested Prescriptions   Pending Prescriptions Disp Refills     LANTUS SOLOSTAR 100 UNIT/ML soln [Pharmacy Med Name: LANTUS SOLOSTAR 100UNIT/ML SOPN] 15 mL 3     Sig: INJECT 50 UNITS SUBCUTANEOUSLY ONCE DAILY    Long Acting Insulin Protocol Failed    12/10/2017  1:59 PM       Failed - HgbA1C in past 3 or 6 months    Recent Labs   Lab Test  04/11/17   0909   A1C  6.9*            Passed - Blood pressure less than 140/90 in past 6 months    BP Readings from Last 3 Encounters:   09/20/17 112/62   04/11/17 128/68   12/27/16 114/80                Passed - LDL on file in past 12 months    Recent Labs   Lab Test  04/11/17   0909   LDL  43            Passed - Microalbumin on file in past 12 months    Recent Labs   Lab Test  04/11/17   0920   MICROL  47   UMALCR  43.61*            Passed - Serum creatinine on file in past 12 months    Recent Labs   Lab Test  04/11/17   0909   CR  1.14            Passed - Patient is age 18 or older       Passed - Recent visit with authorizing provider's specialty in past 6 months    Patient had office visit in the last 6 months or has a visit in the next 30 days with authorizing provider.  See chart review.

## 2017-12-12 RX ORDER — INSULIN GLARGINE 100 [IU]/ML
INJECTION, SOLUTION SUBCUTANEOUS
Qty: 15 ML | Refills: 3 | Status: SHIPPED | OUTPATIENT
Start: 2017-12-12 | End: 2018-04-09

## 2017-12-12 NOTE — TELEPHONE ENCOUNTER
Routing refill request to provider for review/approval because:  Labs out of range:  Microalbumin  Labs not current:  A1C    Keisha Avendaño, RN  [facility]

## 2018-01-02 DIAGNOSIS — N18.1 CHRONIC KIDNEY DISEASE, STAGE I: ICD-10-CM

## 2018-01-02 RX ORDER — LISINOPRIL 20 MG/1
TABLET ORAL
Qty: 60 TABLET | Refills: 5 | Status: SHIPPED | OUTPATIENT
Start: 2018-01-02 | End: 2018-07-16

## 2018-01-02 NOTE — TELEPHONE ENCOUNTER
Prescription approved per [facility] Refill Protocol.    Keisha Avendaño RN  [facility]

## 2018-01-02 NOTE — TELEPHONE ENCOUNTER
Requested Prescriptions   Pending Prescriptions Disp Refills     lisinopril (PRINIVIL/ZESTRIL) 20 MG tablet [Pharmacy Med Name: LISINOPRIL 20MG TABS] 60 tablet 5     Sig: TAKE ONE TABLET BY MOUTH TWICE A DAY    ACE Inhibitors (Including Combos) Protocol Passed    1/2/2018  9:15 AM       Passed - Blood pressure under 140/90    BP Readings from Last 3 Encounters:   09/20/17 112/62   04/11/17 128/68   12/27/16 114/80                Passed - Recent or future visit with authorizing provider's specialty    Patient had office visit in the last year or has a visit in the next 30 days with authorizing provider.  See chart review.              Passed - Patient is age 18 or older       Passed - Normal serum creatinine on file in past 12 months    Recent Labs   Lab Test  04/11/17   0909   CR  1.14            Passed - Normal serum potassium on file in past 12 months    Recent Labs   Lab Test  04/11/17   0909   POTASSIUM  4.3

## 2018-01-19 DIAGNOSIS — I10 HYPERTENSION GOAL BP (BLOOD PRESSURE) < 140/90: ICD-10-CM

## 2018-01-19 RX ORDER — METOPROLOL TARTRATE 50 MG
TABLET ORAL
Qty: 180 TABLET | Refills: 1 | Status: SHIPPED | OUTPATIENT
Start: 2018-01-19 | End: 2018-07-23

## 2018-01-19 NOTE — TELEPHONE ENCOUNTER
Prescription approved per [facility] Refill Protocol.    Keisha Avendaño RN  [facility]

## 2018-01-19 NOTE — TELEPHONE ENCOUNTER
"Requested Prescriptions   Pending Prescriptions Disp Refills     metoprolol tartrate (LOPRESSOR) 50 MG tablet [Pharmacy Med Name: METOPROLOL TARTRATE 50MG TABS] 180 tablet 1     Sig: TAKE ONE TABLET BY MOUTH TWICE DAILY.    Beta-Blockers Protocol Passed    1/19/2018  9:10 AM       Passed - Blood pressure under 140/90    BP Readings from Last 3 Encounters:   09/20/17 112/62   04/11/17 128/68   12/27/16 114/80                Passed - Patient is age 6 or older       Passed - Recent or future visit with authorizing provider's specialty    Patient had office visit in the last year or has a visit in the next 30 days with authorizing provider.  See \"Patient Info\" tab in inbasket, or \"Choose Columns\" in Meds & Orders section of the refill encounter.               Last Written Prescription Date:  7/18/17  Last Fill Quantity: 180,  # refills: 1   Last Office Visit with [facility] or [facility] prescribing provider:  9-20-17   Future Office Visit:       "

## 2018-01-30 ENCOUNTER — OFFICE VISIT (OUTPATIENT)
Dept: FAMILY MEDICINE | Facility: OTHER | Age: 78
End: 2018-01-30
Payer: COMMERCIAL

## 2018-01-30 VITALS
WEIGHT: 226.8 LBS | TEMPERATURE: 97.6 F | SYSTOLIC BLOOD PRESSURE: 120 MMHG | HEART RATE: 68 BPM | DIASTOLIC BLOOD PRESSURE: 62 MMHG | RESPIRATION RATE: 24 BRPM | BODY MASS INDEX: 33.01 KG/M2

## 2018-01-30 DIAGNOSIS — E11.22 TYPE 2 DIABETES MELLITUS WITH STAGE 2 CHRONIC KIDNEY DISEASE, WITH LONG-TERM CURRENT USE OF INSULIN (H): ICD-10-CM

## 2018-01-30 DIAGNOSIS — Z79.4 TYPE 2 DIABETES MELLITUS WITH STAGE 2 CHRONIC KIDNEY DISEASE, WITH LONG-TERM CURRENT USE OF INSULIN (H): ICD-10-CM

## 2018-01-30 DIAGNOSIS — C18.9 MALIGNANT NEOPLASM OF COLON, UNSPECIFIED PART OF COLON (H): ICD-10-CM

## 2018-01-30 DIAGNOSIS — E53.8 VITAMIN B12 DEFICIENCY (NON ANEMIC): ICD-10-CM

## 2018-01-30 DIAGNOSIS — N18.2 TYPE 2 DIABETES MELLITUS WITH STAGE 2 CHRONIC KIDNEY DISEASE, WITH LONG-TERM CURRENT USE OF INSULIN (H): ICD-10-CM

## 2018-01-30 DIAGNOSIS — G62.9 PERIPHERAL POLYNEUROPATHY: ICD-10-CM

## 2018-01-30 DIAGNOSIS — L98.9 SKIN LESION: ICD-10-CM

## 2018-01-30 DIAGNOSIS — J06.9 VIRAL UPPER RESPIRATORY TRACT INFECTION: Primary | ICD-10-CM

## 2018-01-30 LAB
HBA1C MFR BLD: 6.1 % (ref 4.3–6)
VIT B12 SERPL-MCNC: 230 PG/ML (ref 193–986)

## 2018-01-30 PROCEDURE — 82607 VITAMIN B-12: CPT | Performed by: FAMILY MEDICINE

## 2018-01-30 PROCEDURE — 83036 HEMOGLOBIN GLYCOSYLATED A1C: CPT | Performed by: FAMILY MEDICINE

## 2018-01-30 PROCEDURE — 99214 OFFICE O/P EST MOD 30 MIN: CPT | Performed by: FAMILY MEDICINE

## 2018-01-30 PROCEDURE — 36415 COLL VENOUS BLD VENIPUNCTURE: CPT | Performed by: FAMILY MEDICINE

## 2018-01-30 RX ORDER — AMOXICILLIN AND CLAVULANATE POTASSIUM 500; 125 MG/1; MG/1
1 TABLET, FILM COATED ORAL 2 TIMES DAILY
Qty: 20 TABLET | Refills: 0 | Status: SHIPPED | OUTPATIENT
Start: 2018-01-30 | End: 2018-05-02

## 2018-01-30 ASSESSMENT — PAIN SCALES - GENERAL: PAINLEVEL: NO PAIN (0)

## 2018-01-30 NOTE — PROGRESS NOTES
SUBJECTIVE:   Pj Zhao is a 77 year old male who presents to clinic today for the following health issues:      RESPIRATORY SYMPTOMS      Duration: 3 days    Description  nasal congestion, rhinorrhea, cough and chills    Severity: moderate    Accompanying signs and symptoms: None    History (predisposing factors):  none    Precipitating or alleviating factors: None    Therapies tried and outcome:  rest and fluids OTC NSAID guaifenesin      SUBJECTIVE:    Pj is a 77 year old male presenting with uri complaints as noted above.    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     Obesity, Class I, BMI 30-34.9 10/26/2015     Other specified disease of nail     toenails - both feet     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) 10/31/2010     Type II or unspecified type diabetes mellitus without mention of complication, not stated as uncontrolled      Unspecified essential hypertension      Urinary calculus, unspecified 1990    left renal calculus       Current Outpatient Prescriptions   Medication Sig Dispense Refill     amoxicillin-clavulanate (AUGMENTIN) 500-125 MG per tablet Take 1 tablet by mouth 2 times daily 20 tablet 0     metoprolol tartrate (LOPRESSOR) 50 MG tablet TAKE ONE TABLET BY MOUTH TWICE DAILY. 180 tablet 1     B-D U/F 31G X 8 MM insulin pen needle AS DIRECTED 100 each 8     lisinopril (PRINIVIL/ZESTRIL) 20 MG tablet TAKE ONE TABLET BY MOUTH TWICE A DAY 60 tablet 5     LANTUS SOLOSTAR 100 UNIT/ML soln INJECT 50 UNITS SUBCUTANEOUSLY ONCE DAILY 15 mL 3     gabapentin (NEURONTIN) 300 MG capsule TAKE ONE  CAPSULE BY MOUTH AT BEDTIME AS NEEDED FOR NEUROPATHY 30 capsule 5     hydrochlorothiazide (HYDRODIURIL) 25 MG tablet TAKE ONE TABLET BY MOUTH ONCE DAILY 90 tablet 1     amLODIPine (NORVASC) 5 MG tablet TAKE ONE TABLET BY MOUTH ONCE DAILY 90 tablet 3     metFORMIN (GLUCOPHAGE) 500 MG tablet TAKE TWO TABLETS BY MOUTH TWICE A  tablet 1     atorvastatin (LIPITOR) 40 MG tablet TAKE ONE TABLET BY MOUTH ONCE DAILY 30 tablet 5     NOVOLOG FLEXPEN 100 UNIT/ML soln TAKE 15 UNITS BEFORE BREAKFAST, 18 UNITS BEFORE LUNCH AND 18 UNITS BEFORE DINNER PLUS USE OF MED DOSE SLIDING SCALE AS DIRECTED 30 mL 3     ketoconazole (NIZORAL) 2 % cream APPLY TO AFFECTED AREA(S) TOPICALLY AS DIRECTED 15 g 0     meclizine (ANTIVERT) 25 MG tablet Take 1 tablet (25 mg) by mouth every 6 hours as needed for dizziness 30 tablet 1     nitroglycerin (NITROSTAT) 0.4 MG SL tablet Place 1 tablet (0.4 mg) under the tongue See Admin Instructions for chest pain 25 tablet 11     IBUPROFEN PO Take 600 mg by mouth       ASPIRIN 325 MG OR TBEC 1 tab po QD (Once per day)       ONE TOUCH ULTRA test strip USE TO FOUR TIMES A  each 11       OBJECTIVE:  /62  Pulse 68  Temp 97.6  F (36.4  C) (Temporal)  Resp 24  Wt 226 lb 12.8 oz (102.9 kg)  BMI 33.01 kg/m2    General appearance: alert and in no apparent distress  Skin color is pink  Hydration status appears adequate with normal skin turgor and moist mucous membranes.    HEENT:     Left TM is normal: no effusions, no erythema, and normal landmarks.  Right TM is normal: no effusions, no erythema, and normal landmarks.  Oropharyngeal exam is normal: no lesions, erythema, adenopathy or exudate.  Neck is supple with no adenopathy    CARDIAC:NORMAL - regular rate and rhythm without murmur.  RESP: Normal - Clear to auscultation without rales, rhonchi, or wheezing.  ABDOMEN: ne      ASSESSMENT:  Viral upper respiratory illness    PLAN:  Fluids, Rest, OTC cough suppressant/expectorant and rx of  augmentin given to use if not improved in 5 dd or so, in light of recurrent sinusitis and other medical problems.      ADDENDA:    (J06.9,  B97.89) Viral upper respiratory tract infection  (primary encounter diagnosis)  Comment: see above  Plan: amoxicillin-clavulanate (AUGMENTIN) 500-125 MG         per tablet            (E11.22,  N18.2,  Z79.4) Type 2 diabetes mellitus with stage 2 chronic kidney disease, with long-term current use of insulin (H)  Comment: due for a1c  Plan: Hemoglobin A1c            (G62.9) Peripheral polyneuropathy  Comment: has not had a b12 level  Plan: Vitamin B12            (L98.9) Skin lesion  Comment: persistent scaly lesion joselo RLQ, no imp with several different ointments  Plan: ret for bx    (C18.9) Malignant neoplasm of colon, unspecified part of colon (H), hx of  Comment: due for colonoscopy    dbue       ADD: b12 sl low       Recommended po b12 and cooper in 2 months

## 2018-01-30 NOTE — NURSING NOTE
"Chief Complaint   Patient presents with     Cough     x's 3 days       Initial /62  Pulse 68  Temp 97.6  F (36.4  C) (Temporal)  Resp 24  Wt 226 lb 12.8 oz (102.9 kg)  BMI 33.01 kg/m2 Estimated body mass index is 33.01 kg/(m^2) as calculated from the following:    Height as of 2/2/16: 5' 9.5\" (1.765 m).    Weight as of this encounter: 226 lb 12.8 oz (102.9 kg).  Medication Reconciliation: complete  "

## 2018-01-30 NOTE — MR AVS SNAPSHOT
"              After Visit Summary   1/30/2018    Pj Zhao    MRN: [id number]           Patient Information     Date Of Birth          1940        Visit Information        Provider Department      1/30/2018 2:30 PM Jamil Cazares MD [facility]        Today's Diagnoses     Viral upper respiratory tract infection    -  1    Type 2 diabetes mellitus with stage 2 chronic kidney disease, with long-term current use of insulin (H)        Peripheral polyneuropathy        Skin lesion        Malignant neoplasm of colon, unspecified part of colon (H), hx of           Follow-ups after your visit        Who to contact     If you have questions or need follow up information about today's clinic visit or your schedule please contact [facility] directly at [phone].  Normal or non-critical lab and imaging results will be communicated to you by [employer], letter or phone within 4 business days after the clinic has received the results. If you do not hear from us within 7 days, please contact the clinic through [employer] or phone. If you have a critical or abnormal lab result, we will notify you by phone as soon as possible.  Submit refill requests through [employer] or call your pharmacy and they will forward the refill request to us. Please allow 3 business days for your refill to be completed.          Additional Information About Your Visit        [employer] Information     [employer] lets you send messages to your doctor, view your test results, renew your prescriptions, schedule appointments and more. To sign up, go to [email or web address] . Click on \"Log in\" on the left side of the screen, which will take you to the Welcome page. Then click on \"Sign up Now\" on the right side of the page.     You will be asked to enter the access code listed below, as well as some personal information. Please follow the directions to create your username and password.     Your access code is: " [email or web address]  Expires: 2018  3:03 PM     Your access code will  in 90 days. If you need help or a new code, please call your [place] clinic or [phone].        Care EveryWhere ID     This is your Care EveryWhere ID. This could be used by other organizations to access your [place] medical records  [id number]        Your Vitals Were     Pulse Temperature Respirations BMI (Body Mass Index)          68 97.6  F (36.4  C) (Temporal) 24 33.01 kg/m2         Blood Pressure from Last 3 Encounters:   18 120/62   17 112/62   17 128/68    Weight from Last 3 Encounters:   18 226 lb 12.8 oz (102.9 kg)   17 228 lb 14.4 oz (103.8 kg)   16 228 lb (103.4 kg)              We Performed the Following     Hemoglobin A1c     Vitamin B12          Today's Medication Changes          These changes are accurate as of 18  3:07 PM.  If you have any questions, ask your nurse or doctor.               Start taking these medicines.        Dose/Directions    amoxicillin-clavulanate 500-125 MG per tablet   Commonly known as:  AUGMENTIN   Used for:  Viral upper respiratory tract infection   Started by:  Jamil Cazares MD        Dose:  1 tablet   Take 1 tablet by mouth 2 times daily   Quantity:  20 tablet   Refills:  0            Where to get your medicines      Some of these will need a paper prescription and others can be bought over the counter.  Ask your nurse if you have questions.     Bring a paper prescription for each of these medications     amoxicillin-clavulanate 500-125 MG per tablet                Primary Care Provider Office Phone # Fax #    Porfirio Stephan Burleson [phone]       [address] [place] [address]        Equal Access to Services     [facility]: Conrad Martinez, waraquel buenrostro, qaanson kaalisidoro bejarano, mimi monk. So [facility] [phone].    ATENCIÓN: Si habla español, tiene a zelaya disposición  servicios gratuitos de asistencia lingüística. Vanessa castro [phone].    We comply with applicable federal civil rights laws and [place] laws. We do not discriminate on the basis of race, color, national origin, age, disability, sex, sexual orientation, or gender identity.            Thank you!     Thank you for choosing [facility]  for your care. Our goal is always to provide you with excellent care. Hearing back from our patients is one way we can continue to improve our services. Please take a few minutes to complete the written survey that you may receive in the mail after your visit with us. Thank you!             Your Updated Medication List - Protect others around you: Learn how to safely use, store and throw away your medicines at www.disposemymeds.org.          This list is accurate as of 1/30/18  3:07 PM.  Always use your most recent med list.                   Brand Name Dispense Instructions for use Diagnosis    amLODIPine 5 MG tablet    NORVASC    90 tablet    TAKE ONE TABLET BY MOUTH ONCE DAILY    Hypertension goal BP (blood pressure) < 140/90       amoxicillin-clavulanate 500-125 MG per tablet    AUGMENTIN    20 tablet    Take 1 tablet by mouth 2 times daily    Viral upper respiratory tract infection       aspirin 325 MG EC tablet      1 tab po QD (Once per day)        atorvastatin 40 MG tablet    LIPITOR    30 tablet    TAKE ONE TABLET BY MOUTH ONCE DAILY    Hyperlipidemia LDL goal <100       B-D U/F 31G X 8 MM   Generic drug:  insulin pen needle     100 each    AS DIRECTED    Type 2 diabetes mellitus with complication, unspecified long term insulin use status (H)       gabapentin 300 MG capsule    NEURONTIN    30 capsule    TAKE ONE CAPSULE BY MOUTH AT BEDTIME AS NEEDED FOR NEUROPATHY    Type 2 diabetes mellitus with stage 2 chronic kidney disease, without long-term current use of insulin (H)       hydrochlorothiazide 25 MG tablet    HYDRODIURIL    90 tablet    TAKE ONE TABLET BY  MOUTH ONCE DAILY    Hypertension goal BP (blood pressure) < 140/90       IBUPROFEN PO      Take 600 mg by mouth        ketoconazole 2 % cream    NIZORAL    15 g    APPLY TO AFFECTED AREA(S) TOPICALLY AS DIRECTED    Tinea corporis       LANTUS SOLOSTAR 100 UNIT/ML injection   Generic drug:  insulin glargine     15 mL    INJECT 50 UNITS SUBCUTANEOUSLY ONCE DAILY    Type 2 diabetes mellitus with stage 2 chronic kidney disease, with long-term current use of insulin (H)       lisinopril 20 MG tablet    PRINIVIL/ZESTRIL    60 tablet    TAKE ONE TABLET BY MOUTH TWICE A DAY    Chronic kidney disease, stage I       meclizine 25 MG tablet    ANTIVERT    30 tablet    Take 1 tablet (25 mg) by mouth every 6 hours as needed for dizziness    BPPV (benign paroxysmal positional vertigo), bilateral       metFORMIN 500 MG tablet    GLUCOPHAGE    360 tablet    TAKE TWO TABLETS BY MOUTH TWICE A DAY    Type 2 diabetes mellitus with stage 2 chronic kidney disease, without long-term current use of insulin (H)       metoprolol tartrate 50 MG tablet    LOPRESSOR    180 tablet    TAKE ONE TABLET BY MOUTH TWICE DAILY.    Hypertension goal BP (blood pressure) < 140/90       nitroGLYcerin 0.4 MG sublingual tablet    NITROSTAT    25 tablet    Place 1 tablet (0.4 mg) under the tongue See Admin Instructions for chest pain    Chronic ischemic heart disease, unspecified       NovoLOG FLEXPEN 100 UNIT/ML injection   Generic drug:  insulin aspart     30 mL    TAKE 15 UNITS BEFORE BREAKFAST, 18 UNITS BEFORE LUNCH AND 18 UNITS BEFORE DINNER PLUS USE OF MED DOSE SLIDING SCALE AS DIRECTED    Type 2 diabetes mellitus with stage 2 chronic kidney disease, with long-term current use of insulin (H)       ONETOUCH ULTRA test strip   Generic drug:  blood glucose monitoring     100 each    USE TO FOUR TIMES A DAY    Type 2 diabetes mellitus with hyperglycemia (H)

## 2018-02-01 PROBLEM — E53.8 VITAMIN B12 DEFICIENCY (NON ANEMIC): Status: ACTIVE | Noted: 2018-02-01

## 2018-04-09 DIAGNOSIS — N18.2 TYPE 2 DIABETES MELLITUS WITH STAGE 2 CHRONIC KIDNEY DISEASE, WITH LONG-TERM CURRENT USE OF INSULIN (H): ICD-10-CM

## 2018-04-09 DIAGNOSIS — Z79.4 TYPE 2 DIABETES MELLITUS WITH STAGE 2 CHRONIC KIDNEY DISEASE, WITH LONG-TERM CURRENT USE OF INSULIN (H): ICD-10-CM

## 2018-04-09 DIAGNOSIS — E11.22 TYPE 2 DIABETES MELLITUS WITH STAGE 2 CHRONIC KIDNEY DISEASE, WITH LONG-TERM CURRENT USE OF INSULIN (H): ICD-10-CM

## 2018-04-09 NOTE — TELEPHONE ENCOUNTER
"Last Written Prescription Date:  12/12/17  Last Fill Quantity: 15ml,  # refills: 3   Last office visit: 1/30/2018 with prescribing provider:  1/30/18   Future Office Visit:      Requested Prescriptions   Pending Prescriptions Disp Refills     LANTUS SOLOSTAR 100 UNIT/ML soln [Pharmacy Med Name: LANTUS SOLOSTAR 100UNIT/ML SOPN] 15 mL 3     Sig: INJECT 50 UNITS SUBCUTANEOUSLY ONCE DAILY    Long Acting Insulin Protocol Passed    4/9/2018  9:27 AM       Passed - Blood pressure less than 140/90 in past 6 months    BP Readings from Last 3 Encounters:   01/30/18 120/62   09/20/17 112/62   04/11/17 128/68                Passed - LDL on file in past 12 months    Recent Labs   Lab Test  04/11/17   0909   LDL  43            Passed - Microalbumin on file in past 12 months    Recent Labs   Lab Test  04/11/17   0920   MICROL  47   UMALCR  43.61*            Passed - Serum creatinine on file in past 12 months    Recent Labs   Lab Test  04/11/17   0909   CR  1.14            Passed - HgbA1C in past 3 or 6 months    Recent Labs   Lab Test  01/30/18   1451   A1C  6.1*            Passed - Patient is age 18 or older       Passed - Recent (6 mo) or future (30 days) visit within the authorizing provider's specialty    Patient had office visit in the last 6 months or has a visit in the next 30 days with authorizing provider or within the authorizing provider's specialty.  See \"Patient Info\" tab in inbasket, or \"Choose Columns\" in Meds & Orders section of the refill encounter.              "

## 2018-04-10 RX ORDER — INSULIN GLARGINE 100 [IU]/ML
INJECTION, SOLUTION SUBCUTANEOUS
Qty: 15 ML | Refills: 3 | Status: SHIPPED | OUTPATIENT
Start: 2018-04-10 | End: 2018-10-15

## 2018-04-10 NOTE — TELEPHONE ENCOUNTER
Routing refill request to provider for review/approval because:  Labs out of range:  microalbumin  Labs not current:  LDL, microalbumin, Cr  Chanda Swanson RN, BSN

## 2018-04-19 DIAGNOSIS — N18.2 TYPE 2 DIABETES MELLITUS WITH STAGE 2 CHRONIC KIDNEY DISEASE, WITHOUT LONG-TERM CURRENT USE OF INSULIN (H): ICD-10-CM

## 2018-04-19 DIAGNOSIS — E78.5 HYPERLIPIDEMIA LDL GOAL <100: ICD-10-CM

## 2018-04-19 DIAGNOSIS — E11.22 TYPE 2 DIABETES MELLITUS WITH STAGE 2 CHRONIC KIDNEY DISEASE, WITHOUT LONG-TERM CURRENT USE OF INSULIN (H): ICD-10-CM

## 2018-04-19 NOTE — TELEPHONE ENCOUNTER
Routing refill request to provider for review/approval because:  Labs not current:  Microalbumin, Cr, FLP  Chanda Swanson, RN, BSN

## 2018-04-19 NOTE — TELEPHONE ENCOUNTER
"Requested Prescriptions   Pending Prescriptions Disp Refills     metFORMIN (GLUCOPHAGE) 500 MG tablet [Pharmacy Med Name: METFORMIN HCL 500MG TABS] 360 tablet 1     Sig: TAKE TWO TABLETS BY MOUTH TWICE A DAY    Biguanide Agents Failed    4/19/2018  3:01 PM       Failed - Patient has documented LDL within the past 12 mos.    Recent Labs   Lab Test  04/11/17   0909   LDL  43            Failed - Patient has had a Microalbumin in the past 12 mos.    Recent Labs   Lab Test  04/11/17   0920   MICROL  47   UMALCR  43.61*            Passed - Blood pressure less than 140/90 in past 6 months    BP Readings from Last 3 Encounters:   01/30/18 120/62   09/20/17 112/62   04/11/17 128/68                Passed - Patient is age 10 or older       Passed - Patient has documented A1c within the specified period of time.    Recent Labs   Lab Test  01/30/18   1451   A1C  6.1*            Passed - Patient's CR is NOT>1.4 OR Patient's EGFR is NOT<45 within past 12 mos.    Recent Labs   Lab Test  04/11/17 0909   GFRESTIMATED  62   GFRESTBLACK  75       Recent Labs   Lab Test  04/11/17   0909   CR  1.14            Passed - Patient does NOT have a diagnosis of CHF.       Passed - Recent (6 mo) or future (30 days) visit within the authorizing provider's specialty    Patient had office visit in the last 6 months or has a visit in the next 30 days with authorizing provider or within the authorizing provider's specialty.  See \"Patient Info\" tab in inbasket, or \"Choose Columns\" in Meds & Orders section of the refill encounter.            atorvastatin (LIPITOR) 40 MG tablet [Pharmacy Med Name: ATORVASTATIN 40MG TABS] 30 tablet 5     Sig: TAKE ONE TABLET BY MOUTH ONCE DAILY    Statins Protocol Failed    4/19/2018  3:01 PM       Failed - LDL on file in past 12 months    Recent Labs   Lab Test  04/11/17   0909   LDL  43            Passed - No abnormal creatine kinase in past 12 months    No lab results found.            Passed - Recent (12 mo) or " "future (30 days) visit within the authorizing provider's specialty    Patient had office visit in the last 12 months or has a visit in the next 30 days with authorizing provider or within the authorizing provider's specialty.  See \"Patient Info\" tab in inbasket, or \"Choose Columns\" in Meds & Orders section of the refill encounter.           Passed - Patient is age 18 or older          Last Written Prescription Date:  10/25/17  Last Fill Quantity: 360,  # refills: 1   Last Office Visit with [facility] or [facility] prescribing provider:  1/30/18   Future Office Visit:     Atorvastatin 40 MG       Last Written Prescription Date:  1/30/18  Last Fill Quantity: 10/24/17,   # refills: 30  Last Office Visit: 5  Future Office visit:           "

## 2018-04-20 RX ORDER — ATORVASTATIN CALCIUM 40 MG/1
TABLET, FILM COATED ORAL
Qty: 30 TABLET | Refills: 0 | Status: SHIPPED | OUTPATIENT
Start: 2018-04-20 | End: 2018-08-07

## 2018-05-02 ENCOUNTER — TRANSFERRED RECORDS (OUTPATIENT)
Dept: HEALTH INFORMATION MANAGEMENT | Facility: CLINIC | Age: 78
End: 2018-05-02

## 2018-05-02 ENCOUNTER — HOSPITAL ENCOUNTER (EMERGENCY)
Facility: CLINIC | Age: 78
Discharge: ANOTHER HOSPITAL | End: 2018-05-02
Attending: EMERGENCY MEDICINE | Admitting: EMERGENCY MEDICINE
Payer: MEDICARE

## 2018-05-02 ENCOUNTER — APPOINTMENT (OUTPATIENT)
Dept: GENERAL RADIOLOGY | Facility: CLINIC | Age: 78
End: 2018-05-02
Attending: EMERGENCY MEDICINE
Payer: MEDICARE

## 2018-05-02 VITALS
OXYGEN SATURATION: 96 % | TEMPERATURE: 98 F | RESPIRATION RATE: 20 BRPM | WEIGHT: 233 LBS | SYSTOLIC BLOOD PRESSURE: 141 MMHG | BODY MASS INDEX: 33.91 KG/M2 | HEART RATE: 80 BPM | DIASTOLIC BLOOD PRESSURE: 71 MMHG

## 2018-05-02 DIAGNOSIS — I25.110 CORONARY ARTERY DISEASE INVOLVING NATIVE CORONARY ARTERY OF NATIVE HEART WITH UNSTABLE ANGINA PECTORIS (H): ICD-10-CM

## 2018-05-02 DIAGNOSIS — I21.4 NON-STEMI (NON-ST ELEVATED MYOCARDIAL INFARCTION) (H): ICD-10-CM

## 2018-05-02 LAB
ALBUMIN SERPL-MCNC: 3.8 G/DL (ref 3.4–5)
ALP SERPL-CCNC: 85 U/L (ref 40–150)
ALT SERPL W P-5'-P-CCNC: 24 U/L (ref 0–70)
ANION GAP SERPL CALCULATED.3IONS-SCNC: 8 MMOL/L (ref 3–14)
AST SERPL W P-5'-P-CCNC: 15 U/L (ref 0–45)
BASOPHILS # BLD AUTO: 0 10E9/L (ref 0–0.2)
BASOPHILS NFR BLD AUTO: 0.4 %
BILIRUB SERPL-MCNC: 0.5 MG/DL (ref 0.2–1.3)
BUN SERPL-MCNC: 34 MG/DL (ref 7–30)
CALCIUM SERPL-MCNC: 8 MG/DL (ref 8.5–10.1)
CHLORIDE SERPL-SCNC: 108 MMOL/L (ref 94–109)
CO2 SERPL-SCNC: 25 MMOL/L (ref 20–32)
CREAT SERPL-MCNC: 1.24 MG/DL (ref 0.66–1.25)
CRP SERPL-MCNC: <2.9 MG/L (ref 0–8)
DIFFERENTIAL METHOD BLD: ABNORMAL
EJECTION FRACTION: 55
EOSINOPHIL # BLD AUTO: 0.1 10E9/L (ref 0–0.7)
EOSINOPHIL NFR BLD AUTO: 1.8 %
ERYTHROCYTE [DISTWIDTH] IN BLOOD BY AUTOMATED COUNT: 13.2 % (ref 10–15)
GFR SERPL CREATININE-BSD FRML MDRD: 56 ML/MIN/1.7M2
GLUCOSE SERPL-MCNC: 294 MG/DL (ref 70–99)
HCT VFR BLD AUTO: 38.8 % (ref 40–53)
HGB BLD-MCNC: 12.8 G/DL (ref 13.3–17.7)
IMM GRANULOCYTES # BLD: 0 10E9/L (ref 0–0.4)
IMM GRANULOCYTES NFR BLD: 0.4 %
LYMPHOCYTES # BLD AUTO: 1 10E9/L (ref 0.8–5.3)
LYMPHOCYTES NFR BLD AUTO: 20.9 %
MCH RBC QN AUTO: 31.7 PG (ref 26.5–33)
MCHC RBC AUTO-ENTMCNC: 33 G/DL (ref 31.5–36.5)
MCV RBC AUTO: 96 FL (ref 78–100)
MONOCYTES # BLD AUTO: 0.6 10E9/L (ref 0–1.3)
MONOCYTES NFR BLD AUTO: 12.2 %
NEUTROPHILS # BLD AUTO: 3.2 10E9/L (ref 1.6–8.3)
NEUTROPHILS NFR BLD AUTO: 64.3 %
PLATELET # BLD AUTO: 148 10E9/L (ref 150–450)
POTASSIUM SERPL-SCNC: 4.1 MMOL/L (ref 3.4–5.3)
PROT SERPL-MCNC: 7 G/DL (ref 6.8–8.8)
RBC # BLD AUTO: 4.04 10E12/L (ref 4.4–5.9)
SODIUM SERPL-SCNC: 141 MMOL/L (ref 133–144)
TROPONIN I SERPL-MCNC: 0.04 UG/L (ref 0–0.04)
TROPONIN I SERPL-MCNC: 0.91 UG/L (ref 0–0.04)
WBC # BLD AUTO: 4.9 10E9/L (ref 4–11)

## 2018-05-02 PROCEDURE — 36415 COLL VENOUS BLD VENIPUNCTURE: CPT | Performed by: EMERGENCY MEDICINE

## 2018-05-02 PROCEDURE — 80053 COMPREHEN METABOLIC PANEL: CPT | Performed by: EMERGENCY MEDICINE

## 2018-05-02 PROCEDURE — 85025 COMPLETE CBC W/AUTO DIFF WBC: CPT | Performed by: EMERGENCY MEDICINE

## 2018-05-02 PROCEDURE — 99285 EMERGENCY DEPT VISIT HI MDM: CPT | Mod: 25 | Performed by: EMERGENCY MEDICINE

## 2018-05-02 PROCEDURE — 96375 TX/PRO/DX INJ NEW DRUG ADDON: CPT | Performed by: EMERGENCY MEDICINE

## 2018-05-02 PROCEDURE — 93005 ELECTROCARDIOGRAM TRACING: CPT | Performed by: EMERGENCY MEDICINE

## 2018-05-02 PROCEDURE — 86140 C-REACTIVE PROTEIN: CPT | Performed by: EMERGENCY MEDICINE

## 2018-05-02 PROCEDURE — 84484 ASSAY OF TROPONIN QUANT: CPT | Performed by: EMERGENCY MEDICINE

## 2018-05-02 PROCEDURE — 96365 THER/PROPH/DIAG IV INF INIT: CPT | Performed by: EMERGENCY MEDICINE

## 2018-05-02 PROCEDURE — 96366 THER/PROPH/DIAG IV INF ADDON: CPT | Performed by: EMERGENCY MEDICINE

## 2018-05-02 PROCEDURE — 71046 X-RAY EXAM CHEST 2 VIEWS: CPT | Mod: TC

## 2018-05-02 PROCEDURE — 25000132 ZZH RX MED GY IP 250 OP 250 PS 637: Mod: GY | Performed by: EMERGENCY MEDICINE

## 2018-05-02 PROCEDURE — A9270 NON-COVERED ITEM OR SERVICE: HCPCS | Mod: GY | Performed by: EMERGENCY MEDICINE

## 2018-05-02 PROCEDURE — 25000128 H RX IP 250 OP 636: Performed by: EMERGENCY MEDICINE

## 2018-05-02 PROCEDURE — 93010 ELECTROCARDIOGRAM REPORT: CPT | Mod: Z6 | Performed by: EMERGENCY MEDICINE

## 2018-05-02 RX ORDER — ASPIRIN 325 MG
325 TABLET ORAL ONCE
Status: COMPLETED | OUTPATIENT
Start: 2018-05-02 | End: 2018-05-02

## 2018-05-02 RX ORDER — NITROGLYCERIN 0.4 MG/1
0.4 TABLET SUBLINGUAL EVERY 5 MIN PRN
Status: DISCONTINUED | OUTPATIENT
Start: 2018-05-02 | End: 2018-05-02 | Stop reason: HOSPADM

## 2018-05-02 RX ORDER — HEPARIN SODIUM 10000 [USP'U]/100ML
0-3500 INJECTION, SOLUTION INTRAVENOUS CONTINUOUS
Status: DISCONTINUED | OUTPATIENT
Start: 2018-05-02 | End: 2018-05-02 | Stop reason: HOSPADM

## 2018-05-02 RX ADMIN — NITROGLYCERIN 0.4 MG: 0.4 TABLET SUBLINGUAL at 01:57

## 2018-05-02 RX ADMIN — NITROGLYCERIN 0.4 MG: 0.4 TABLET SUBLINGUAL at 01:48

## 2018-05-02 RX ADMIN — HEPARIN SODIUM 1200 UNITS/HR: 10000 INJECTION, SOLUTION INTRAVENOUS at 04:48

## 2018-05-02 RX ADMIN — ASPIRIN 325 MG ORAL TABLET 325 MG: 325 PILL ORAL at 06:01

## 2018-05-02 ASSESSMENT — ENCOUNTER SYMPTOMS
CHEST TIGHTNESS: 1
SHORTNESS OF BREATH: 1

## 2018-05-02 NOTE — ED PROVIDER NOTES
History     Chief Complaint   Patient presents with     Chest Pain     The history is provided by the patient, the spouse and medical records.     Pj Zhao is a 77 year old male who presents to the ER for evaluation of chest pain and shortness of breath that awoke the patient at 0030 hours.  Patient has a history significant for type 2 diabetes, chronic kidney disease, previous stroke, and coronary artery disease status post stent placement in the LAD in 2004 and residual stenoses in the right coronary and circumflex arteries at that time.  He had been medically managed for those residual changes.  He did report that he has been having ongoing and off episodes of chest pain for the last several weeks and was going to make an appointment to see his primary care provider to discuss this, however, the pain awoke him from sleep tonight prompting him to come in for evaluation now.  He denies any nausea or vomiting.  He did have some mild diaphoresis at home.    Problem List:    Patient Active Problem List    Diagnosis Date Noted     Vitamin B12 deficiency (non anemic) 02/01/2018     Priority: Medium     Malignant neoplasm of colon, unspecified part of colon (H), hx of 01/30/2018     Priority: Medium     Type 2 diabetes mellitus with stage 2 chronic kidney disease, with long-term current use of insulin (H) 01/06/2017     Priority: Medium     Obesity, Class I, BMI 30-34.9 10/26/2015     Priority: Medium     CKD (chronic kidney disease) stage 2, GFR 60-89 ml/min 10/26/2015     Priority: Medium     Chronic ischemic heart disease 11/29/2014     Priority: Medium     Problem list name updated by automated process. Provider to review       Acute right-sided weakness 11/29/2014     Priority: Medium     Slurring of speech 11/29/2014     Priority: Medium     S/P coronary artery stent placement LAD in 2004 11/28/2014     Priority: Medium     Dysarthria 11/28/2014     Priority: Medium     Dysphagia 11/28/2014     Priority:  Medium     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, right 11/28/2014     Priority: Medium     Acute ischemic stroke- left christian 11/27/2014     Priority: Medium     DVT prophylaxis 11/27/2014     Priority: Medium     Advanced directives, counseling/discussion 11/27/2014     Priority: Medium     ED (erectile dysfunction) 10/03/2014     Priority: Medium     Hypertension goal BP (blood pressure) < 140/90 07/26/2012     Priority: Medium     Microalbuminuria 03/07/2011     Priority: Medium     Hyperlipidemia LDL goal <100 02/11/2011     Priority: Medium     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) 10/31/2010     Priority: Medium     Coronary atherosclerosis of native coronary artery 06/30/2005     Priority: Medium     History of malignant neoplasm of large intestine 03/03/2003     Priority: Medium     Problem list name updated by automated process. Provider to review          Past Medical History:    Past Medical History:   Diagnosis Date     CKD (chronic kidney disease) stage 1, GFR 90 ml/min or greater      CKD (chronic kidney disease) stage 2, GFR 60-89 ml/min 10/26/2015     Diabetic eye exam (H) 10/19/13, 11/25/14     Impotence of organic origin      Microalbuminuria 3/7/2011     NONSPECIFIC MEDICAL HISTORY 1977     Obesity, Class I, BMI 30-34.9 10/26/2015     Other specified disease of nail      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) 10/31/2010     Type II or unspecified type diabetes mellitus without mention of complication, not stated as uncontrolled      Unspecified essential hypertension      Urinary calculus, unspecified 1990       Past Surgical History:    Past Surgical History:   Procedure Laterality Date     C APPENDECTOMY  3/11/2003     C NONSPECIFIC PROCEDURE      Laser surgery for destruction of kidney stones     C NONSPECIFIC PROCEDURE  3/11/2003    Lower anterior sigmoid resection with end-to-end anastomosis.       COLONOSCOPY  03/06/07    repeat in 3-5 yrs.      COLONOSCOPY  6/28/2011    Procedure:COMBINED COLONOSCOPY, REMOVE TUMOR/POLYP/LESION BY SNARE; Surgeon:JOANN PATEL; Location:[facility]     HC REMOVAL OF TONSILS,<11 Y/O      Tonsils <12y.o.     PHACOEMULSIFICATION WITH STANDARD INTRAOCULAR LENS IMPLANT Right 1/15/2015    Procedure: PHACOEMULSIFICATION WITH STANDARD INTRAOCULAR LENS IMPLANT;  Surgeon: Jamarcus Ferrer MD;  Location: [facility]     PHACOEMULSIFICATION WITH STANDARD INTRAOCULAR LENS IMPLANT Left 2/19/2015    Procedure: PHACOEMULSIFICATION WITH STANDARD INTRAOCULAR LENS IMPLANT;  Surgeon: Jamarcus Ferrer MD;  Location: [facility]       Family History:    Family History   Problem Relation Age of Onset     DIABETES Mother      Obesity Mother      CANCER Sister      Mouth       Social History:  Marital Status:   [2]  Social History   Substance Use Topics     Smoking status: Never Smoker     Smokeless tobacco: Never Used     Alcohol use No        Medications:      amLODIPine (NORVASC) 5 MG tablet   ASPIRIN 325 MG OR TBEC   atorvastatin (LIPITOR) 40 MG tablet   B-D U/F 31G X 8 MM insulin pen needle   gabapentin (NEURONTIN) 300 MG capsule   hydrochlorothiazide (HYDRODIURIL) 25 MG tablet   IBUPROFEN PO   ketoconazole (NIZORAL) 2 % cream   LANTUS SOLOSTAR 100 UNIT/ML soln   lisinopril (PRINIVIL/ZESTRIL) 20 MG tablet   meclizine (ANTIVERT) 25 MG tablet   metFORMIN (GLUCOPHAGE) 500 MG tablet   metoprolol tartrate (LOPRESSOR) 50 MG tablet   nitroglycerin (NITROSTAT) 0.4 MG SL tablet   NOVOLOG FLEXPEN 100 UNIT/ML soln   ONE TOUCH ULTRA test strip         Review of Systems   Respiratory: Positive for chest tightness and shortness of breath.    Cardiovascular: Positive for chest pain.   All other systems reviewed and are negative.      Physical Exam   BP: 174/88  Pulse: 80  Heart Rate: 80  Temp: 98  F (36.7  C)  Resp: 20  Weight: 105.7 kg (233 lb)  SpO2: 97 %      Physical Exam   Constitutional: He appears well-developed. No distress.   HENT:   Head:  Normocephalic.   Mouth/Throat: Oropharynx is clear and moist.   Eyes: EOM are normal. Pupils are equal, round, and reactive to light.   Neck: Normal range of motion. Neck supple.   Cardiovascular: Normal rate, normal heart sounds and intact distal pulses.    Pulmonary/Chest: Effort normal and breath sounds normal.   Abdominal: Soft. Bowel sounds are normal.   Musculoskeletal: Normal range of motion.   Neurological: He is alert.   Skin: Skin is warm.   Psychiatric: He has a normal mood and affect.   Nursing note and vitals reviewed.      ED Course     ED Course     Procedures          EK18  0140 Indication: Chest pain.  This EKG was reviewed and interpreted by me.  Normal sinus rhythm with a rate of 80 bpm.  Normal DE interval.  Right atrial enlargement.  Left anterior fascicular block.  LVH by voltage criteria.  V2 morphology has changed since previous EKG done on 2014.         Results for orders placed or performed during the hospital encounter of 18 (from the past 24 hour(s))   CBC with platelets differential   Result Value Ref Range    WBC 4.9 4.0 - 11.0 10e9/L    RBC Count 4.04 (L) 4.4 - 5.9 10e12/L    Hemoglobin 12.8 (L) 13.3 - 17.7 g/dL    Hematocrit 38.8 (L) 40.0 - 53.0 %    MCV 96 78 - 100 fl    MCH 31.7 26.5 - 33.0 pg    MCHC 33.0 31.5 - 36.5 g/dL    RDW 13.2 10.0 - 15.0 %    Platelet Count 148 (L) 150 - 450 10e9/L    Diff Method Automated Method     % Neutrophils 64.3 %    % Lymphocytes 20.9 %    % Monocytes 12.2 %    % Eosinophils 1.8 %    % Basophils 0.4 %    % Immature Granulocytes 0.4 %    Absolute Neutrophil 3.2 1.6 - 8.3 10e9/L    Absolute Lymphocytes 1.0 0.8 - 5.3 10e9/L    Absolute Monocytes 0.6 0.0 - 1.3 10e9/L    Absolute Eosinophils 0.1 0.0 - 0.7 10e9/L    Absolute Basophils 0.0 0.0 - 0.2 10e9/L    Abs Immature Granulocytes 0.0 0 - 0.4 10e9/L   Comprehensive metabolic panel   Result Value Ref Range    Sodium 141 133 - 144 mmol/L    Potassium 4.1 3.4 - 5.3 mmol/L     Chloride 108 94 - 109 mmol/L    Carbon Dioxide 25 20 - 32 mmol/L    Anion Gap 8 3 - 14 mmol/L    Glucose 294 (H) 70 - 99 mg/dL    Urea Nitrogen 34 (H) 7 - 30 mg/dL    Creatinine 1.24 0.66 - 1.25 mg/dL    GFR Estimate 56 (L) >60 mL/min/1.7m2    GFR Estimate If Black 68 >60 mL/min/1.7m2    Calcium 8.0 (L) 8.5 - 10.1 mg/dL    Bilirubin Total 0.5 0.2 - 1.3 mg/dL    Albumin 3.8 3.4 - 5.0 g/dL    Protein Total 7.0 6.8 - 8.8 g/dL    Alkaline Phosphatase 85 40 - 150 U/L    ALT 24 0 - 70 U/L    AST 15 0 - 45 U/L   Troponin I   Result Value Ref Range    Troponin I ES 0.035 0.000 - 0.045 ug/L   CRP inflammation   Result Value Ref Range    CRP Inflammation <2.9 0.0 - 8.0 mg/L   XR Chest 2 Views    Narrative    CHEST 2 VIEWS  5/2/2018 2:15 AM     HISTORY: Chest pain.    COMPARISON: None.    FINDINGS: The lungs are clear. Normal-sized cardiac silhouette.  Tortuous or ectatic thoracic aorta.      Impression    IMPRESSION: No evidence of active cardiopulmonary disease.   Troponin I   Result Value Ref Range    Troponin I ES 0.913 (HH) 0.000 - 0.045 ug/L       Medications   nitroGLYcerin (NITROSTAT) sublingual tablet 0.4 mg (0.4 mg Sublingual Given 5/2/18 0157)   heparin  drip 25,000 units in 0.45% NaCl 250 mL (see additional administration details for dose) (1,200 Units/hr Intravenous New Bag 5/2/18 5372)   heparin bolus from infusion pump (not administered)   heparin Loading Dose from infusion pump *Give when STARTING heparin infusion 6,000 Units (6,000 Units Intravenous Given 5/2/18 1267)       Assessments & Plan (with Medical Decision Making)  Pj Zhao is a 77-year-old male presenting to the ED for evaluation of acute onset of retrosternal chest pain associated with shortness of breath and some nausea.  Patient has a past medical history significant for type 2 diabetes, coronary artery disease, stroke, hypertension, and hyperlipidemia.  He had undergone an angiogram and angioplasty in 2004 where he had a stent placed in  his LAD and was identified as having residual stenoses in the right coronary and left circumflex arteries that are being medically managed.  What is concerning is the patient has been having periods of staggering episodic angina over the last several weeks and was going to make an appointment with his primary care provider to discuss this until he had this episode tonight that woke him from sleep.  The patient did take a full aspirin today.  His exam is fairly unremarkable for any significant abnormalities.  The patient was given a sublingual nitroglycerin with amelioration of his chest pain.  There was nothing on his EKG today to suggest an ST elevation MI.  Initial labs showed dermal CBC and comprehensive metabolic panel.  His troponin I was initially 0.035 which although is normal was concerning as this was taken within an hour and a half of his onset of symptoms.  His C-reactive protein is unremarkable.  Chest x-ray is obtained and other than showing a tortuous ectatic thoracic aorta there is no significant abnormalities.  I elected to keep the patient in the ED for a recheck of his troponin I 1.5 hours after the initial draw.  Repeat troponin came back elevated at 0.913 which is significant.  At this time this is beginning to appear to be in an non-STEMI and although the patient is currently pain-free, we still initiated heparin therapy and I am arranging for him to be transferred to [facility] for cardiac evaluation.  This was by patient's request as his previous cardiac workups have been at that institution.  I discussed the case with , hospitalist at [facility], who accepts the patient in transfer for admission to telemetry awaiting further workup by cardiology for a non-ST elevated MI.  This was discussed with family were in agreement with this plan.     I have reviewed the nursing notes.    I have reviewed the findings, diagnosis, plan and need for follow up with the  patient.       New Prescriptions    No medications on file       Final diagnoses:   Coronary artery disease involving native coronary artery of native heart with unstable angina pectoris (H)   Non-STEMI (non-ST elevated myocardial infarction) (H)       5/2/2018   [facility] EMERGENCY DEPARTMENT     Karri Shaffer MD  05/02/18 1220

## 2018-05-02 NOTE — ED NOTES
Patient remains pain free at this time. Awaiting transport to [facility]. Ambulance service reports it may be up to 2 hours before they can send a crew out.

## 2018-05-02 NOTE — ED NOTES
"Patient back from x-ray, placed back on cardiac monitor. Patient reports pain is gone and he \"feels back to normal\". Resting at this time awaiting lab results.   "

## 2018-05-03 ENCOUNTER — TRANSFERRED RECORDS (OUTPATIENT)
Dept: HEALTH INFORMATION MANAGEMENT | Facility: CLINIC | Age: 78
End: 2018-05-03

## 2018-05-09 LAB
CREAT SERPL-MCNC: 1.11 MG/DL (ref 0.72–1.25)
GFR SERPL CREATININE-BSD FRML MDRD: >60 ML/MIN/1.73M2
GLUCOSE SERPL-MCNC: 86 MG/DL (ref 70–100)
POTASSIUM SERPL-SCNC: 3.8 MMOL/L (ref 3.5–5.1)

## 2018-05-10 ENCOUNTER — TELEPHONE (OUTPATIENT)
Dept: FAMILY MEDICINE | Facility: OTHER | Age: 78
End: 2018-05-10

## 2018-05-10 NOTE — TELEPHONE ENCOUNTER
"Well Srinivas, isn't it customary to have a post discharge office visit?  If so, I will be happy to fit him in  tomorrow in [place] and at that time we can set up whatever therapy is appropriate.  I do not think there is such a thing as \"emergency physical therapy\" following any cardiac surgery.    Benjy"

## 2018-05-10 NOTE — TELEPHONE ENCOUNTER
Reason for Call:  Other patient information     Detailed comments: Yazmin calling from [facility], patient was discharged from [facility] and was to receive home care, but patient is not able to receive home care as he is not home bound. Patient needs out patient Physical Therapy,as he just had heart surgery and needs done asap please advise patient and get this set up     Phone Number Patient can be reached at: Other phone number:  [phone] Yazmin Patients phone #[phone]    Best Time: asap    Can we leave a detailed message on this number? NO    Call taken on 5/10/2018 at 1:28 PM by Mariann Harden

## 2018-05-11 NOTE — TELEPHONE ENCOUNTER
Spoke with Rehab, they are calling [facility] for the orders that were indicated on the discharge summary. Will follow up with Home Care nurse as I receive more information.    Madhavi Davenport XRO/  [facility]

## 2018-05-11 NOTE — TELEPHONE ENCOUNTER
Spoke with patient, he will be contacted by cardiac rehab for an appointment.    Madhavi Davenport XRO/  [facility]

## 2018-05-12 DIAGNOSIS — E78.5 HYPERLIPIDEMIA LDL GOAL <100: ICD-10-CM

## 2018-05-14 DIAGNOSIS — N18.2 TYPE 2 DIABETES MELLITUS WITH STAGE 2 CHRONIC KIDNEY DISEASE, WITHOUT LONG-TERM CURRENT USE OF INSULIN (H): ICD-10-CM

## 2018-05-14 DIAGNOSIS — E11.22 TYPE 2 DIABETES MELLITUS WITH STAGE 2 CHRONIC KIDNEY DISEASE, WITHOUT LONG-TERM CURRENT USE OF INSULIN (H): ICD-10-CM

## 2018-05-14 RX ORDER — ATORVASTATIN CALCIUM 40 MG/1
TABLET, FILM COATED ORAL
Qty: 30 TABLET | Refills: 5 | Status: SHIPPED | OUTPATIENT
Start: 2018-05-14 | End: 2018-09-20

## 2018-05-14 NOTE — TELEPHONE ENCOUNTER
"Requested Prescriptions   Pending Prescriptions Disp Refills     atorvastatin (LIPITOR) 40 MG tablet [Pharmacy Med Name: ATORVASTATIN 40MG TABS] 30 tablet 5    Last Written Prescription Date:  4-  Last Fill Quantity: 30,  # refills: 0   Last office visit: 1/30/2018 with prescribing provider:  1-30-18     Future Office Visit:   Next 5 appointments (look out 90 days)     May 15, 2018 10:00 AM CDT   Office Visit with Porfirio Burleson DO   [facility] ([facility]    [address] [facility] [id number] [phone]                  Sig: TAKE ONE TABLET BY MOUTH ONCE DAILY    Statins Protocol Failed    5/12/2018 10:26 PM       Failed - LDL on file in past 12 months    Recent Labs   Lab Test  04/11/17   0909   LDL  43            Passed - No abnormal creatine kinase in past 12 months    No lab results found.            Passed - Recent (12 mo) or future (30 days) visit within the authorizing provider's specialty    Patient had office visit in the last 12 months or has a visit in the next 30 days with authorizing provider or within the authorizing provider's specialty.  See \"Patient Info\" tab in inbasket, or \"Choose Columns\" in Meds & Orders section of the refill encounter.           Passed - Patient is age 18 or older          "

## 2018-05-14 NOTE — TELEPHONE ENCOUNTER
Routing refill request to provider for review/approval because:  Labs not current:  RAFAEL Avendaño, RN  [facility]

## 2018-05-15 ENCOUNTER — OFFICE VISIT (OUTPATIENT)
Dept: FAMILY MEDICINE | Facility: OTHER | Age: 78
End: 2018-05-15
Payer: COMMERCIAL

## 2018-05-15 VITALS
DIASTOLIC BLOOD PRESSURE: 58 MMHG | SYSTOLIC BLOOD PRESSURE: 108 MMHG | RESPIRATION RATE: 16 BRPM | TEMPERATURE: 97.1 F | HEART RATE: 74 BPM | OXYGEN SATURATION: 97 %

## 2018-05-15 DIAGNOSIS — N18.2 TYPE 2 DIABETES MELLITUS WITH STAGE 2 CHRONIC KIDNEY DISEASE, WITH LONG-TERM CURRENT USE OF INSULIN (H): ICD-10-CM

## 2018-05-15 DIAGNOSIS — I10 HYPERTENSION GOAL BP (BLOOD PRESSURE) < 140/90: ICD-10-CM

## 2018-05-15 DIAGNOSIS — E11.22 TYPE 2 DIABETES MELLITUS WITH STAGE 2 CHRONIC KIDNEY DISEASE, WITH LONG-TERM CURRENT USE OF INSULIN (H): ICD-10-CM

## 2018-05-15 DIAGNOSIS — Z95.1 S/P CABG (CORONARY ARTERY BYPASS GRAFT): Primary | ICD-10-CM

## 2018-05-15 DIAGNOSIS — Z79.4 TYPE 2 DIABETES MELLITUS WITH STAGE 2 CHRONIC KIDNEY DISEASE, WITH LONG-TERM CURRENT USE OF INSULIN (H): ICD-10-CM

## 2018-05-15 LAB
ANION GAP SERPL CALCULATED.3IONS-SCNC: 8 MMOL/L (ref 3–14)
BUN SERPL-MCNC: 29 MG/DL (ref 7–30)
CALCIUM SERPL-MCNC: 8.7 MG/DL (ref 8.5–10.1)
CHLORIDE SERPL-SCNC: 106 MMOL/L (ref 94–109)
CO2 SERPL-SCNC: 24 MMOL/L (ref 20–32)
CREAT SERPL-MCNC: 1.38 MG/DL (ref 0.66–1.25)
ERYTHROCYTE [DISTWIDTH] IN BLOOD BY AUTOMATED COUNT: 13.1 % (ref 10–15)
GFR SERPL CREATININE-BSD FRML MDRD: 50 ML/MIN/1.7M2
GLUCOSE SERPL-MCNC: 140 MG/DL (ref 70–99)
HBA1C MFR BLD: 6.3 % (ref 0–5.6)
HCT VFR BLD AUTO: 34.7 % (ref 40–53)
HGB BLD-MCNC: 11.2 G/DL (ref 13.3–17.7)
MCH RBC QN AUTO: 31.7 PG (ref 26.5–33)
MCHC RBC AUTO-ENTMCNC: 32.3 G/DL (ref 31.5–36.5)
MCV RBC AUTO: 98 FL (ref 78–100)
PLATELET # BLD AUTO: 420 10E9/L (ref 150–450)
POTASSIUM SERPL-SCNC: 4.9 MMOL/L (ref 3.4–5.3)
RBC # BLD AUTO: 3.53 10E12/L (ref 4.4–5.9)
SODIUM SERPL-SCNC: 138 MMOL/L (ref 133–144)
WBC # BLD AUTO: 10.7 10E9/L (ref 4–11)

## 2018-05-15 PROCEDURE — 36415 COLL VENOUS BLD VENIPUNCTURE: CPT | Performed by: INTERNAL MEDICINE

## 2018-05-15 PROCEDURE — 85027 COMPLETE CBC AUTOMATED: CPT | Performed by: INTERNAL MEDICINE

## 2018-05-15 PROCEDURE — 83036 HEMOGLOBIN GLYCOSYLATED A1C: CPT | Performed by: INTERNAL MEDICINE

## 2018-05-15 PROCEDURE — 80048 BASIC METABOLIC PNL TOTAL CA: CPT | Performed by: INTERNAL MEDICINE

## 2018-05-15 PROCEDURE — 99214 OFFICE O/P EST MOD 30 MIN: CPT | Performed by: INTERNAL MEDICINE

## 2018-05-15 RX ORDER — GABAPENTIN 300 MG/1
CAPSULE ORAL
Qty: 30 CAPSULE | Refills: 5 | Status: SHIPPED | OUTPATIENT
Start: 2018-05-15 | End: 2018-09-20

## 2018-05-15 RX ORDER — ACETAMINOPHEN 325 MG/1
650 TABLET ORAL
COMMUNITY
Start: 2018-05-09 | End: 2022-09-16

## 2018-05-15 RX ORDER — FUROSEMIDE 20 MG
20 TABLET ORAL
COMMUNITY
Start: 2018-05-09 | End: 2018-08-07

## 2018-05-15 RX ORDER — POTASSIUM CHLORIDE 750 MG/1
10 TABLET, EXTENDED RELEASE ORAL
COMMUNITY
Start: 2018-05-09 | End: 2018-08-07

## 2018-05-15 ASSESSMENT — PAIN SCALES - GENERAL: PAINLEVEL: NO PAIN (0)

## 2018-05-15 NOTE — MR AVS SNAPSHOT
"              After Visit Summary   5/15/2018    Pj Zhao    MRN: [id number]           Patient Information     Date Of Birth          1940        Visit Information        Provider Department      5/15/2018 10:00 AM Porfirio Burleson DO [facility]        Today's Diagnoses     S/P CABG (coronary artery bypass graft)    -  1    Type 2 diabetes mellitus with stage 2 chronic kidney disease, with long-term current use of insulin (H)        Hypertension goal BP (blood pressure) < 140/90           Follow-ups after your visit        Additional Services     CARDIAC REHAB REFERRAL       *This therapy referral will be filtered to a centralized scheduling office at [facility] and the patient will receive a call to schedule an appointment at a [facility] location most convenient for them.*     If you have not heard from the scheduling office within 2 business days, please call [phone] for all locations.    Please be aware that coverage of these services is subject to the terms and limitations of your health insurance plan.  Call member services at your health plan with any benefit or coverage questions.      **Note to Provider:  If you are referring outside of [facility] for the therapy appointment, please list the name of the location in the \"special instructions\" above, print the referral and give to the patient to schedule the appointment.                         Follow-up notes from your care team     Return in about 2 weeks (around 5/29/2018).      Who to contact     If you have questions or need follow up information about today's clinic visit or your schedule please contact [facility] directly at [phone].  Normal or non-critical lab and imaging results will be communicated to you by MyChart, letter or phone within 4 business days after the clinic has received the results. If you do not hear from us within 7 days, please contact the clinic through " "[employer] or phone. If you have a critical or abnormal lab result, we will notify you by phone as soon as possible.  Submit refill requests through [employer] or call your pharmacy and they will forward the refill request to us. Please allow 3 business days for your refill to be completed.          Additional Information About Your Visit        [employer] Information     [employer] lets you send messages to your doctor, view your test results, renew your prescriptions, schedule appointments and more. To sign up, go to [email or web address] . Click on \"Log in\" on the left side of the screen, which will take you to the Welcome page. Then click on \"Sign up Now\" on the right side of the page.     You will be asked to enter the access code listed below, as well as some personal information. Please follow the directions to create your username and password.     Your access code is: [email or web address]  Expires: 2018 11:56 AM     Your access code will  in 90 days. If you need help or a new code, please call your [facility] clinic or [phone].        Care EveryWhere ID     This is your Care EveryWhere ID. This could be used by other organizations to access your [facility] medical records  [id number]        Your Vitals Were     Pulse Temperature Respirations Pulse Oximetry          74 97.1  F (36.2  C) (Temporal) 16 97%         Blood Pressure from Last 3 Encounters:   05/15/18 108/58   18 141/71   18 120/62    Weight from Last 3 Encounters:   18 233 lb (105.7 kg)   18 226 lb 12.8 oz (102.9 kg)   17 228 lb 14.4 oz (103.8 kg)              We Performed the Following     Basic metabolic panel     CARDIAC REHAB REFERRAL     CBC with platelets     Hemoglobin A1c          Today's Medication Changes          These changes are accurate as of 5/15/18 11:56 AM.  If you have any questions, ask your nurse or doctor.               These medicines have changed or have updated prescriptions.        Dose/Directions "    metFORMIN 500 MG tablet   Commonly known as:  GLUCOPHAGE   This may have changed:  See the new instructions.   Used for:  Type 2 diabetes mellitus with stage 2 chronic kidney disease, without long-term current use of insulin (H)        TAKE TWO TABLETS BY MOUTH TWICE A DAY   Quantity:  360 tablet   Refills:  0                Primary Care Provider Office Phone # Fax #    Porfirio Burleson [phone]       [address] [place] [address]        Equal Access to Services     MARSHALL HOPE : Hadii aad ku hadasho Soomaali, waaxda luqadaha, qaybta kaalmada adeegyada, waxay idiin hayaan adeeg kharash la'randi . So [facility] [phone].    ATENCIÓN: Si habla español, tiene a zelaya disposición servicios gratuitos de asistencia lingüística. [facility] [phone].    We comply with applicable federal civil rights laws and [place] laws. We do not discriminate on the basis of race, color, national origin, age, disability, sex, sexual orientation, or gender identity.            Thank you!     Thank you for choosing [facility]  for your care. Our goal is always to provide you with excellent care. Hearing back from our patients is one way we can continue to improve our services. Please take a few minutes to complete the written survey that you may receive in the mail after your visit with us. Thank you!             Your Updated Medication List - Protect others around you: Learn how to safely use, store and throw away your medicines at www.disposemymeds.org.          This list is accurate as of 5/15/18 11:56 AM.  Always use your most recent med list.                   Brand Name Dispense Instructions for use Diagnosis    acetaminophen 325 MG tablet    TYLENOL     Take 650 mg by mouth        amLODIPine 5 MG tablet    NORVASC    90 tablet    TAKE ONE TABLET BY MOUTH ONCE DAILY    Hypertension goal BP (blood pressure) < 140/90       aspirin 325 MG EC tablet      1 tab po QD (Once per day)         * atorvastatin 40 MG tablet    LIPITOR    30 tablet    TAKE ONE TABLET BY MOUTH ONCE DAILY    Hyperlipidemia LDL goal <100       * atorvastatin 40 MG tablet    LIPITOR    30 tablet    TAKE ONE TABLET BY MOUTH ONCE DAILY    Hyperlipidemia LDL goal <100       B-D U/F 31G X 8 MM   Generic drug:  insulin pen needle     100 each    AS DIRECTED    Type 2 diabetes mellitus with complication, unspecified long term insulin use status (H)       furosemide 20 MG tablet    LASIX     Take 20 mg by mouth        gabapentin 300 MG capsule    NEURONTIN    30 capsule    TAKE ONE CAPSULE BY MOUTH AT BEDTIME AS NEEDED FOR NEUROPATHY    Type 2 diabetes mellitus with stage 2 chronic kidney disease, without long-term current use of insulin (H)       hydrochlorothiazide 25 MG tablet    HYDRODIURIL    90 tablet    TAKE ONE TABLET BY MOUTH ONCE DAILY    Hypertension goal BP (blood pressure) < 140/90       IBUPROFEN PO      Take 600 mg by mouth        ketoconazole 2 % cream    NIZORAL    15 g    APPLY TO AFFECTED AREA(S) TOPICALLY AS DIRECTED    Tinea corporis       LANTUS SOLOSTAR 100 UNIT/ML injection   Generic drug:  insulin glargine     15 mL    INJECT 50 UNITS SUBCUTANEOUSLY ONCE DAILY    Type 2 diabetes mellitus with stage 2 chronic kidney disease, with long-term current use of insulin (H)       lisinopril 20 MG tablet    PRINIVIL/ZESTRIL    60 tablet    TAKE ONE TABLET BY MOUTH TWICE A DAY    Chronic kidney disease, stage I       meclizine 25 MG tablet    ANTIVERT    30 tablet    Take 1 tablet (25 mg) by mouth every 6 hours as needed for dizziness    BPPV (benign paroxysmal positional vertigo), bilateral       metFORMIN 500 MG tablet    GLUCOPHAGE    360 tablet    TAKE TWO TABLETS BY MOUTH TWICE A DAY    Type 2 diabetes mellitus with stage 2 chronic kidney disease, without long-term current use of insulin (H)       metoprolol tartrate 50 MG tablet    LOPRESSOR    180 tablet    TAKE ONE TABLET BY MOUTH TWICE DAILY.    Hypertension  goal BP (blood pressure) < 140/90       nitroGLYcerin 0.4 MG sublingual tablet    NITROSTAT    25 tablet    Place 1 tablet (0.4 mg) under the tongue See Admin Instructions for chest pain    Chronic ischemic heart disease, unspecified       NovoLOG FLEXPEN 100 UNIT/ML injection   Generic drug:  insulin aspart     30 mL    TAKE 15 UNITS BEFORE BREAKFAST, 18 UNITS BEFORE LUNCH AND 18 UNITS BEFORE DINNER PLUS USE OF MED DOSE SLIDING SCALE AS DIRECTED    Type 2 diabetes mellitus with stage 2 chronic kidney disease, with long-term current use of insulin (H)       ONETOUCH ULTRA test strip   Generic drug:  blood glucose monitoring     100 each    USE TO FOUR TIMES A DAY    Type 2 diabetes mellitus with hyperglycemia (H)       potassium chloride SA 10 MEQ CR tablet    K-DUR/KLOR-CON M     Take 10 mEq by mouth        * Notice:  This list has 2 medication(s) that are the same as other medications prescribed for you. Read the directions carefully, and ask your doctor or other care provider to review them with you.

## 2018-05-15 NOTE — LETTER
June 8, 2018      Pj Zhao  [address] [facility] [address]        Dear ,    We are writing to inform you of your test results.    The chemistry panel shows an elevated blood sugar of 140.   The kidney function is slightly decreased.   A1c has gone up minimally from 6.1  4 months ago to 6.3 currently.   Evidence of mild anemia is noted.   Not terribly surprising given his recent coronary artery bypass graft    Resulted Orders   CBC with platelets   Result Value Ref Range    WBC 10.7 4.0 - 11.0 10e9/L    RBC Count 3.53 (L) 4.4 - 5.9 10e12/L    Hemoglobin 11.2 (L) 13.3 - 17.7 g/dL    Hematocrit 34.7 (L) 40.0 - 53.0 %    MCV 98 78 - 100 fl    MCH 31.7 26.5 - 33.0 pg    MCHC 32.3 31.5 - 36.5 g/dL    RDW 13.1 10.0 - 15.0 %    Platelet Count 420 150 - 450 10e9/L   Basic metabolic panel   Result Value Ref Range    Sodium 138 133 - 144 mmol/L    Potassium 4.9 3.4 - 5.3 mmol/L    Chloride 106 94 - 109 mmol/L    Carbon Dioxide 24 20 - 32 mmol/L    Anion Gap 8 3 - 14 mmol/L    Glucose 140 (H) 70 - 99 mg/dL    Urea Nitrogen 29 7 - 30 mg/dL    Creatinine 1.38 (H) 0.66 - 1.25 mg/dL    GFR Estimate 50 (L) >60 mL/min/1.7m2      Comment:      Non  GFR Calc    GFR Estimate If Black 60 (L) >60 mL/min/1.7m2      Comment:       GFR Calc    Calcium 8.7 8.5 - 10.1 mg/dL   Hemoglobin A1c   Result Value Ref Range    Hemoglobin A1C 6.3 (H) 0 - 5.6 %      Comment:      Normal <5.7% Prediabetes 5.7-6.4%  Diabetes 6.5% or higher - adopted from ADA   consensus guidelines.         If you have any questions or concerns, please call the clinic at the number listed above.       Sincerely,        Porfirio Burleson,

## 2018-05-15 NOTE — PROGRESS NOTES
Chief Complaint   Patient presents with     Follow-Up from triple bypass     Check spots on leg                     Chief Complaint           The patient is a pleasant 77-year-old gentleman who recently had a three vessel coronary artery bypass graft.  He notes that he is not doing well.  His astronomy has healed well but he does have a little bit of musculoskeletal discomfort.  He has little bit of bruising from one of these small incisions in his lower left leg the vein donation was taken.  He denies any chest pain, lightheadedness, dizziness or significant dyspnea.He does have a history of diabetes and design some therapy.  He has adjusted insulin therapy to his needs.  He notes that his normal home diet was inconsistent with the dosages that they had given him my discharge.  His blood sugars now once again controlled on his previous dosages.  They are listed in the EMR.  He has not yet started cardiac rehabilitation we have discussed this and the benefits in great detail.                       PAST, FAMILY,SOCIAL HISTORY:     Medical  History:   has a past medical history of CKD (chronic kidney disease) stage 1, GFR 90 ml/min or greater; CKD (chronic kidney disease) stage 2, GFR 60-89 ml/min (10/26/2015); Diabetic eye exam (H) (10/19/13, 11/25/14); Impotence of organic origin; Microalbuminuria (3/7/2011); NONSPECIFIC MEDICAL HISTORY (1977); Obesity, Class I, BMI 30-34.9 (10/26/2015); Other specified disease of nail; 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) (10/31/2010); Type II or unspecified type diabetes mellitus without mention of complication, not stated as uncontrolled; Unspecified essential hypertension; and Urinary calculus, unspecified (1990).     Surgical History:   has a past surgical history that includes NONSPECIFIC PROCEDURE; REMOVAL OF TONSILS,<13 Y/O; NONSPECIFIC PROCEDURE (3/11/2003); APPENDECTOMY (3/11/2003); colonoscopy (03/06/07); Colonoscopy (6/28/2011);  Phacoemulsification with standard intraocular lens implant (Right, 1/15/2015); and Phacoemulsification with standard intraocular lens implant (Left, 2/19/2015).     Social History:   reports that he has never smoked. He has never used smokeless tobacco. He reports that he does not drink alcohol or use illicit drugs.     Family History:  family history includes CANCER in his sister; DIABETES in his mother; Obesity in his mother.            MEDICATIONS  Current Outpatient Prescriptions   Medication Sig Dispense Refill     acetaminophen (TYLENOL) 325 MG tablet Take 650 mg by mouth       ASPIRIN 325 MG OR TBEC 1 tab po QD (Once per day)       atorvastatin (LIPITOR) 40 MG tablet TAKE ONE TABLET BY MOUTH ONCE DAILY 30 tablet 0     furosemide (LASIX) 20 MG tablet Take 20 mg by mouth       ketoconazole (NIZORAL) 2 % cream APPLY TO AFFECTED AREA(S) TOPICALLY AS DIRECTED 15 g 0     LANTUS SOLOSTAR 100 UNIT/ML soln INJECT 50 UNITS SUBCUTANEOUSLY ONCE DAILY 15 mL 3     lisinopril (PRINIVIL/ZESTRIL) 20 MG tablet TAKE ONE TABLET BY MOUTH TWICE A DAY 60 tablet 5     metFORMIN (GLUCOPHAGE) 500 MG tablet TAKE TWO TABLETS BY MOUTH TWICE A DAY (Patient taking differently: TAKE one TABLET BY MOUTH TWICE A DAY) 360 tablet 0     metoprolol tartrate (LOPRESSOR) 50 MG tablet TAKE ONE TABLET BY MOUTH TWICE DAILY. 180 tablet 1     NOVOLOG FLEXPEN 100 UNIT/ML soln TAKE 15 UNITS BEFORE BREAKFAST, 18 UNITS BEFORE LUNCH AND 18 UNITS BEFORE DINNER PLUS USE OF MED DOSE SLIDING SCALE AS DIRECTED 30 mL 3     potassium chloride SA (K-DUR/KLOR-CON M) 10 MEQ CR tablet Take 10 mEq by mouth       amLODIPine (NORVASC) 5 MG tablet TAKE ONE TABLET BY MOUTH ONCE DAILY (Patient not taking: Reported on 5/15/2018) 90 tablet 3     atorvastatin (LIPITOR) 40 MG tablet TAKE ONE TABLET BY MOUTH ONCE DAILY 30 tablet 5     B-D U/F 31G X 8 MM insulin pen needle AS DIRECTED 100 each 8     gabapentin (NEURONTIN) 300 MG capsule TAKE ONE CAPSULE BY MOUTH AT BEDTIME AS  NEEDED FOR NEUROPATHY 30 capsule 5     hydrochlorothiazide (HYDRODIURIL) 25 MG tablet TAKE ONE TABLET BY MOUTH ONCE DAILY (Patient not taking: Reported on 5/15/2018) 90 tablet 1     IBUPROFEN PO Take 600 mg by mouth       meclizine (ANTIVERT) 25 MG tablet Take 1 tablet (25 mg) by mouth every 6 hours as needed for dizziness (Patient not taking: Reported on 5/15/2018) 30 tablet 1     nitroglycerin (NITROSTAT) 0.4 MG SL tablet Place 1 tablet (0.4 mg) under the tongue See Admin Instructions for chest pain (Patient not taking: Reported on 5/15/2018) 25 tablet 11     ONE TOUCH ULTRA test strip USE TO FOUR TIMES A  each 11         --------------------------------------------------------------------------------------------------------------------                  Review of Systems     LUNGS: Pt denies: cough,excess sputum, hemoptysis, or shortness of breath.   HEART: Pt denies: chest pain, arrythmia, syncope, tachy or bradyarrhythmia.   GI: Pt denies: nausea, vomitting, diarrhea, constipation, melena, or hematochezia.   NEURO: Pt denies: seizures, strokes, diplopia, weakness, paraesthesias, or paralysis.   SKIN: Pt denies: itching, rashes, discoloration, or specific lesions of concern. Denies recent hair loss.   PSYCH: The patient denies significant depression, anxiety, mood imbalance. Specifically denies any suicidal ideation.                        Examination      /58  Pulse 74  Temp 97.1  F (36.2  C) (Temporal)  Resp 16  SpO2 97%   Constitutional: The patient appears to be in no acute distress. The patient appears to be adequately hydrated. No acute respiratory or hemodynamic distress is noted at this time.   LUNGS: clear bilaterally, airflow is brisk, no intercostal retraction or stridor is noted. No coughing is noted during visit.   HEART:  regular without rubs, clicks, gallops, or murmurs. PMI is nondisplaced. Upstrokes are brisk. S1,S2 are heard.   GI: Abdomen is soft, without rebound, guarding or  tenderness. Bowel sounds are appropriate. No renal bruits are heard.    NEURO: Pt is alert and appropriate. No neurologic lateralization is noted. Cranial nerves 2-12 are intact. Peripheral sensory and motor function are grossly normal.    MS: Minimal crepitance is noted in the extremities. No deformity is present. Muscle strength is appropriate and equal bilaterally. No acute joint erythema or swelling is present.                       Decision-Making          1. S/P CABG (coronary artery bypass graft)  Check hemoglobin and renal function.  Set up cardiac rehab  - CBC with platelets  - Basic metabolic panel  - CARDIAC REHAB REFERRAL    2. Type 2 diabetes mellitus with stage 2 chronic kidney disease, with long-term current use of insulin (H)  Continue current insulin program and checked A1c - Hemoglobin A1c    3. Hypertension goal BP (blood pressure) < 140/90  Currently controlled.  Continue ACE inhibitor and beta-blocker.                             FOLLOW UP   I have asked the patient to make an appointment for followup with me In two weeks With blood sugar log.      I have carefully explained the diagnosis and treatment options with the patient. The patient has displayed an understanding of the above, and all subsequent questions were answered.               DO CAREN Phillips    Portions of this note were produced using [employer]  Although every attempt at real-time proof reading has been made, occasional grammar/syntax errors may have been missed.

## 2018-05-30 ENCOUNTER — HOSPITAL ENCOUNTER (OUTPATIENT)
Dept: CARDIAC REHAB | Facility: CLINIC | Age: 78
End: 2018-05-30
Attending: INTERNAL MEDICINE
Payer: MEDICARE

## 2018-05-30 PROCEDURE — 93797 PHYS/QHP OP CAR RHAB WO ECG: CPT | Mod: 59 | Performed by: REHABILITATION PRACTITIONER

## 2018-05-30 PROCEDURE — 93798 PHYS/QHP OP CAR RHAB W/ECG: CPT | Performed by: REHABILITATION PRACTITIONER

## 2018-05-30 PROCEDURE — 40000116 ZZH STATISTIC OP CR VISIT: Performed by: REHABILITATION PRACTITIONER

## 2018-05-30 PROCEDURE — 40000575 ZZH STATISTIC OP CARDIAC VISIT #2: Performed by: REHABILITATION PRACTITIONER

## 2018-06-01 ENCOUNTER — HOSPITAL ENCOUNTER (OUTPATIENT)
Dept: CARDIAC REHAB | Facility: CLINIC | Age: 78
End: 2018-06-01
Attending: INTERNAL MEDICINE
Payer: MEDICARE

## 2018-06-01 PROCEDURE — 93798 PHYS/QHP OP CAR RHAB W/ECG: CPT

## 2018-06-01 PROCEDURE — 40000116 ZZH STATISTIC OP CR VISIT

## 2018-06-06 ENCOUNTER — HOSPITAL ENCOUNTER (OUTPATIENT)
Dept: CARDIAC REHAB | Facility: CLINIC | Age: 78
End: 2018-06-06
Attending: INTERNAL MEDICINE
Payer: MEDICARE

## 2018-06-06 PROCEDURE — 93798 PHYS/QHP OP CAR RHAB W/ECG: CPT

## 2018-06-06 PROCEDURE — 40000116 ZZH STATISTIC OP CR VISIT

## 2018-06-08 NOTE — PROGRESS NOTES
Please contact the patient and notify him of the following:    The chemistry panel shows an elevated blood sugar of 140.  The kidney function is slightly decreased.  A1c has gone up minimally from 6.1  4 months ago to 6.3 currently.  Evidence of mild anemia is noted.  Not terribly surprising given his recent coronary artery bypass graft  Thank you.   DO CAREN Phillips

## 2018-06-13 ENCOUNTER — TRANSFERRED RECORDS (OUTPATIENT)
Dept: HEALTH INFORMATION MANAGEMENT | Facility: CLINIC | Age: 78
End: 2018-06-13

## 2018-06-20 DIAGNOSIS — E11.22 TYPE 2 DIABETES MELLITUS WITH STAGE 2 CHRONIC KIDNEY DISEASE, WITH LONG-TERM CURRENT USE OF INSULIN (H): ICD-10-CM

## 2018-06-20 DIAGNOSIS — N18.2 TYPE 2 DIABETES MELLITUS WITH STAGE 2 CHRONIC KIDNEY DISEASE, WITH LONG-TERM CURRENT USE OF INSULIN (H): ICD-10-CM

## 2018-06-20 DIAGNOSIS — Z79.4 TYPE 2 DIABETES MELLITUS WITH STAGE 2 CHRONIC KIDNEY DISEASE, WITH LONG-TERM CURRENT USE OF INSULIN (H): ICD-10-CM

## 2018-06-20 RX ORDER — INSULIN ASPART 100 [IU]/ML
INJECTION, SOLUTION INTRAVENOUS; SUBCUTANEOUS
Qty: 30 ML | Refills: 3 | Status: SHIPPED | OUTPATIENT
Start: 2018-06-20 | End: 2019-02-18

## 2018-06-20 NOTE — TELEPHONE ENCOUNTER
Routing refill request to provider for review/approval because:  Labs out of range:  Microalbumin, Creatinine  Labs not current:  LDL, Microalbumin    Keisha Avendaño RN  [facility]

## 2018-06-20 NOTE — TELEPHONE ENCOUNTER
"Last Written Prescription Date:  9/25/2017  Last Fill Quantity: 30 mL,  # refills: 3   Last office visit:5/15/2018 with prescribing provider:  Dr. Burleson   Future Office Visit:    Requested Prescriptions   Pending Prescriptions Disp Refills     NOVOLOG FLEXPEN 100 UNIT/ML soln [Pharmacy Med Name: NOVOLOG FLEXPEN 100UNIT/ML SOPN] 30 mL 3     Sig: TAKE 15 UNITS BEFORE BREAKFAST, 18 UNITS BEFORE LUNCH AND 18 UNITS BEFORE DINNER PLUS USE OF MED DOSE SLIDING SCALE AS DIRECTED    Short Acting Insulin Protocol Failed    6/20/2018  8:35 AM       Failed - LDL on file in past 12 months    Recent Labs   Lab Test  04/11/17   0909   LDL  43            Passed - Blood pressure less than 140/90 in past 6 months    BP Readings from Last 3 Encounters:   05/15/18 108/58   05/02/18 141/71   01/30/18 120/62                Passed - Microalbumin on file in past 12 months    Recent Labs   Lab Test  04/11/17   0920   MICROL  47   UMALCR  43.61*            Passed - Serum creatinine on file in past 12 months    Recent Labs   Lab Test  05/15/18   1037   CR  1.38*            Passed - HgbA1C in past 3 or 6 months    If HgbA1C is 8 or greater, it needs to be on file within the past 3 months.  If less than 8, must be on file within the past 6 months.     Recent Labs   Lab Test  05/15/18   1037   A1C  6.3*            Passed - Patient is age 18 or older       Passed - Recent (6 mo) or future (30 days) visit within the authorizing provider's specialty    Patient had office visit in the last 6 months or has a visit in the next 30 days with authorizing provider or within the authorizing provider's specialty.  See \"Patient Info\" tab in inbasket, or \"Choose Columns\" in Meds & Orders section of the refill encounter.              "

## 2018-07-16 DIAGNOSIS — N18.1 CHRONIC KIDNEY DISEASE, STAGE I: ICD-10-CM

## 2018-07-16 NOTE — TELEPHONE ENCOUNTER
"Requested Prescriptions   Pending Prescriptions Disp Refills     lisinopril (PRINIVIL/ZESTRIL) 20 MG tablet [Pharmacy Med Name: LISINOPRIL 20MG TABS] 60 tablet 5    Last Written Prescription Date:  1-2-2018  Last Fill Quantity: 60,  # refills: 5   Last office visit: 5/15/2018 with prescribing provider:  5-15-18   Future Office Visit:     Sig: TAKE ONE TABLET BY MOUTH TWICE A DAY    ACE Inhibitors (Including Combos) Protocol Failed    7/16/2018  9:45 AM       Failed - Normal serum creatinine on file in past 12 months    Recent Labs   Lab Test  05/15/18   1037   CR  1.38*            Passed - Blood pressure under 140/90 in past 12 months    BP Readings from Last 3 Encounters:   05/15/18 108/58   05/02/18 141/71   01/30/18 120/62                Passed - Recent (12 mo) or future (30 days) visit within the authorizing provider's specialty    Patient had office visit in the last 12 months or has a visit in the next 30 days with authorizing provider or within the authorizing provider's specialty.  See \"Patient Info\" tab in inbasket, or \"Choose Columns\" in Meds & Orders section of the refill encounter.           Passed - Patient is age 18 or older       Passed - Normal serum potassium on file in past 12 months    Recent Labs   Lab Test  05/15/18   1037   POTASSIUM  4.9               "

## 2018-07-17 RX ORDER — LISINOPRIL 20 MG/1
TABLET ORAL
Qty: 60 TABLET | Refills: 5 | Status: SHIPPED | OUTPATIENT
Start: 2018-07-17 | End: 2019-01-14

## 2018-07-17 NOTE — TELEPHONE ENCOUNTER
Routing refill request to provider for review/approval because:  Labs out of range:  Creatinine    Keisha Avendaño RN  [facility]

## 2018-07-23 DIAGNOSIS — I10 HYPERTENSION GOAL BP (BLOOD PRESSURE) < 140/90: ICD-10-CM

## 2018-07-23 NOTE — TELEPHONE ENCOUNTER
"Requested Prescriptions   Pending Prescriptions Disp Refills     metoprolol tartrate (LOPRESSOR) 50 MG tablet [Pharmacy Med Name: METOPROLOL TARTRATE 50MG TABS]  Last Written Prescription Date:  1/19/18  Last Fill Quantity: 180,  # refills: 1   Last office visit: 5/15/2018 with prescribing provider:  5/15/18   Future Office Visit:     180 tablet 1     Sig: TAKE ONE TABLET BY MOUTH TWICE A DAY    Beta-Blockers Protocol Passed    7/23/2018  9:16 AM       Passed - Blood pressure under 140/90 in past 12 months    BP Readings from Last 3 Encounters:   05/15/18 108/58   05/02/18 141/71   01/30/18 120/62                Passed - Patient is age 6 or older       Passed - Recent (12 mo) or future (30 days) visit within the authorizing provider's specialty    Patient had office visit in the last 12 months or has a visit in the next 30 days with authorizing provider or within the authorizing provider's specialty.  See \"Patient Info\" tab in inbasket, or \"Choose Columns\" in Meds & Orders section of the refill encounter.              "

## 2018-07-24 RX ORDER — METOPROLOL TARTRATE 50 MG
TABLET ORAL
Qty: 180 TABLET | Refills: 0 | Status: SHIPPED | OUTPATIENT
Start: 2018-07-24 | End: 2018-10-15

## 2018-07-24 NOTE — TELEPHONE ENCOUNTER
Prescription approved per [facility] Refill Protocol.    Keisha Avendaño RN  [facility]

## 2018-08-07 DIAGNOSIS — E78.5 HYPERLIPIDEMIA LDL GOAL <100: ICD-10-CM

## 2018-08-07 DIAGNOSIS — R60.1 GENERALIZED EDEMA: Primary | ICD-10-CM

## 2018-08-07 RX ORDER — ATORVASTATIN CALCIUM 40 MG/1
40 TABLET, FILM COATED ORAL DAILY
Qty: 30 TABLET | Refills: 3 | Status: SHIPPED | OUTPATIENT
Start: 2018-08-07 | End: 2019-01-02

## 2018-08-07 RX ORDER — POTASSIUM CHLORIDE 750 MG/1
10 TABLET, EXTENDED RELEASE ORAL DAILY
Qty: 90 TABLET | Refills: 0 | Status: SHIPPED | OUTPATIENT
Start: 2018-08-07 | End: 2018-10-02

## 2018-08-07 RX ORDER — FUROSEMIDE 20 MG
20 TABLET ORAL DAILY
Qty: 30 TABLET | Refills: 3 | Status: SHIPPED | OUTPATIENT
Start: 2018-08-07 | End: 2018-11-27

## 2018-08-07 NOTE — TELEPHONE ENCOUNTER
Reason for Call:  Medication or medication refill:    Do you use a [facility]?  Name of the pharmacy and phone number for the current request:  [facility] - [phone]    Name of the medication requested: patient states [facility] faxed these over last week, patient is out & needs these refilled.  Please advise.  atorvastatin (LIPITOR) 40 MG tablet [ & furosemide (LASIX) 20 MG tablet         Other request:     Can we leave a detailed message on this number? YES    Phone number patient can be reached at: Home number on file [phone] (home)    Best Time:     Call taken on 8/7/2018 at 12:49 PM by Amy Linares

## 2018-08-07 NOTE — TELEPHONE ENCOUNTER
Routing refill request to provider for review/approval because:  Labs out of range:  Creatinine  Labs not current:  LDL  Medication is historical    CHICO EspinalN, RN  [facility]

## 2018-08-07 NOTE — TELEPHONE ENCOUNTER
"Requested Prescriptions   Pending Prescriptions Disp Refills     atorvastatin (LIPITOR) 40 MG tablet 30 tablet 0    Last Written Prescription Date:  5/14/18  Last Fill Quantity: 30 ,  # refills: 5   Last office visit: 5/15/2018 with prescribing provider:  5/15/18   Future Office Visit:     Sig: Take 1 tablet (40 mg) by mouth daily    Statins Protocol Failed    8/7/2018  1:11 PM       Failed - LDL on file in past 12 months    Recent Labs   Lab Test  04/11/17   0909   LDL  43            Passed - No abnormal creatine kinase in past 12 months    No lab results found.            Passed - Recent (12 mo) or future (30 days) visit within the authorizing provider's specialty    Patient had office visit in the last 12 months or has a visit in the next 30 days with authorizing provider or within the authorizing provider's specialty.  See \"Patient Info\" tab in inbasket, or \"Choose Columns\" in Meds & Orders section of the refill encounter.           Passed - Patient is age 18 or older        furosemide (LASIX) 20 MG tablet 30 tablet     Last Written Prescription Date:  5/9/18  Last Fill Quantity: ,  # refills:    Last office visit: 5/15/2018 with prescribing provider:  5/15/18   Future Office Visit:     Sig: Take 1 tablet (20 mg) by mouth    Diuretics (Including Combos) Protocol Failed    8/7/2018  1:11 PM       Failed - Normal serum creatinine on file in past 12 months    Recent Labs   Lab Test  05/15/18   1037   CR  1.38*             Passed - Blood pressure under 140/90 in past 12 months    BP Readings from Last 3 Encounters:   05/15/18 108/58   05/02/18 141/71   01/30/18 120/62                Passed - Recent (12 mo) or future (30 days) visit within the authorizing provider's specialty    Patient had office visit in the last 12 months or has a visit in the next 30 days with authorizing provider or within the authorizing provider's specialty.  See \"Patient Info\" tab in inbasket, or \"Choose Columns\" in Meds & Orders section of " "the refill encounter.           Passed - Patient is age 18 or older       Passed - Normal serum potassium on file in past 12 months    Recent Labs   Lab Test  05/15/18   1037   POTASSIUM  4.9                   Passed - Normal serum sodium on file in past 12 months    Recent Labs   Lab Test  05/15/18   1037   NA  138              potassium chloride SA (K-DUR/KLOR-CON M) 10 MEQ CR tablet 90 tablet     Last Written Prescription Date:  5/9/18  Last Fill Quantity: ,  # refills:    Last office visit: 5/15/2018 with prescribing provider:  5/15/18   Future Office Visit:     Sig: Take 1 tablet (10 mEq) by mouth    Potassium Supplements Protocol Passed    8/7/2018  1:11 PM       Passed - Recent (12 mo) or future (30 days) visit within the authorizing provider's specialty    Patient had office visit in the last 12 months or has a visit in the next 30 days with authorizing provider or within the authorizing provider's specialty.  See \"Patient Info\" tab in inbasket, or \"Choose Columns\" in Meds & Orders section of the refill encounter.           Passed - Patient is age 18 or older       Passed - Normal serum potassium in past 12 months    Recent Labs   Lab Test  05/15/18   1037   POTASSIUM  4.9                      "

## 2018-08-14 DIAGNOSIS — E11.65 TYPE 2 DIABETES MELLITUS WITH HYPERGLYCEMIA (H): ICD-10-CM

## 2018-08-15 NOTE — TELEPHONE ENCOUNTER
"Requested Prescriptions   Pending Prescriptions Disp Refills     ONETOUCH ULTRA test strip [Pharmacy Med Name: ONETOUCH ULTRA BLUE  STRP] 100 each 11     Sig: USE TO TEST FOUR TIMES A DAY    Diabetic Supplies Protocol Passed    8/14/2018  5:10 PM       Passed - Patient is 18 years of age or older       Passed - Recent (6 mo) or future (30 days) visit within the authorizing provider's specialty    Patient had office visit in the last 6 months or has a visit in the next 30 days with authorizing provider.  See \"Patient Info\" tab in inbasket, or \"Choose Columns\" in Meds & Orders section of the refill encounter.            Last Written Prescription Date:  6/27/17  Last Fill Quantity: 100,  # refills: 11  Last office visit: 5/15/2018 with prescribing provider:     Future Office Visit:      "

## 2018-08-16 NOTE — TELEPHONE ENCOUNTER
Prescription approved per [facility] Refill Protocol.    CHICO EspinalN, RN  [facility]

## 2018-09-20 DIAGNOSIS — E11.22 TYPE 2 DIABETES MELLITUS WITH STAGE 2 CHRONIC KIDNEY DISEASE, WITHOUT LONG-TERM CURRENT USE OF INSULIN (H): ICD-10-CM

## 2018-09-20 DIAGNOSIS — N18.2 TYPE 2 DIABETES MELLITUS WITH STAGE 2 CHRONIC KIDNEY DISEASE, WITHOUT LONG-TERM CURRENT USE OF INSULIN (H): ICD-10-CM

## 2018-09-20 RX ORDER — NITROGLYCERIN 0.4 MG/1
0.4 TABLET SUBLINGUAL SEE ADMIN INSTRUCTIONS
Qty: 25 TABLET | Refills: 3 | Status: SHIPPED | OUTPATIENT
Start: 2018-09-20 | End: 2021-01-15

## 2018-09-20 RX ORDER — GABAPENTIN 300 MG/1
300 CAPSULE ORAL AT BEDTIME
Qty: 90 CAPSULE | Refills: 0 | Status: SHIPPED | OUTPATIENT
Start: 2018-09-20 | End: 2018-11-27

## 2018-09-20 NOTE — TELEPHONE ENCOUNTER
"Requested Prescriptions   Pending Prescriptions Disp Refills     gabapentin (NEURONTIN) 300 MG capsule 90 capsule 0    Last Written Prescription Date:  05/15/2018  Last Fill Quantity: 30,  # refills: 5   Last office visit: 5/15/2018 with prescribing provider:  05/15/2018Melonie    Future Office Visit:     Sig: Take 1 capsule (300 mg) by mouth At Bedtime    There is no refill protocol information for this order        nitroGLYcerin (NITROSTAT) 0.4 MG sublingual tablet 25 tablet 3    Last Written Prescription Date:  02/02/2016  Last Fill Quantity: 25,  # refills: 11   Last office visit: 5/15/2018 with prescribing provider:  05/15/2018-Benjy    Future Office Visit:     Sig: Place 1 tablet (0.4 mg) under the tongue See Admin Instructions for chest pain    Nitrates Failed    9/20/2018  1:04 PM       Failed - Sublingual nitro order needs review    If refill exceeds 1 bottle per month, please forward request to provider.          Passed - Blood pressure under 140/90 in past 12 months    BP Readings from Last 3 Encounters:   05/15/18 108/58   05/02/18 141/71   01/30/18 120/62                Passed - Pt is not on erectile dysfunction medications       Passed - Recent (12 mo) or future (30 days) visit within the authorizing provider's specialty    Patient had office visit in the last 12 months or has a visit in the next 30 days with authorizing provider or within the authorizing provider's specialty.  See \"Patient Info\" tab in inbasket, or \"Choose Columns\" in Meds & Orders section of the refill encounter.           Passed - Patient is age 18 or older        Routing to covering provider to address in absence of Dr. Burleson out of office until 9/25.     "

## 2018-10-02 DIAGNOSIS — N18.2 TYPE 2 DIABETES MELLITUS WITH STAGE 2 CHRONIC KIDNEY DISEASE, WITHOUT LONG-TERM CURRENT USE OF INSULIN (H): ICD-10-CM

## 2018-10-02 DIAGNOSIS — E11.22 TYPE 2 DIABETES MELLITUS WITH STAGE 2 CHRONIC KIDNEY DISEASE, WITHOUT LONG-TERM CURRENT USE OF INSULIN (H): ICD-10-CM

## 2018-10-02 DIAGNOSIS — R60.1 GENERALIZED EDEMA: ICD-10-CM

## 2018-10-02 RX ORDER — POTASSIUM CHLORIDE 750 MG/1
TABLET, EXTENDED RELEASE ORAL
Qty: 90 TABLET | Refills: 0 | Status: SHIPPED | OUTPATIENT
Start: 2018-10-02 | End: 2018-10-12

## 2018-10-02 NOTE — TELEPHONE ENCOUNTER
"Requested Prescriptions   Pending Prescriptions Disp Refills     potassium chloride SA (K-DUR/KLOR-CON M) 10 MEQ CR tablet [Pharmacy Med Name: POTASSIUM CHL ER TAB MICRO 10MEQ] 90 tablet 0    Last Written Prescription Date:  8/7/18  Last Fill Quantity: 90,  # refills: 0   Last office visit: 5/15/2018 with prescribing provider:  5/15/18   Future Office Visit:     Sig: TAKE ONE TABLET BY MOUTH ONCE DAILY    Potassium Supplements Protocol Passed    10/2/2018  8:57 AM       Passed - Recent (12 mo) or future (30 days) visit within the authorizing provider's specialty    Patient had office visit in the last 12 months or has a visit in the next 30 days with authorizing provider or within the authorizing provider's specialty.  See \"Patient Info\" tab in inbasket, or \"Choose Columns\" in Meds & Orders section of the refill encounter.           Passed - Patient is age 18 or older       Passed - Normal serum potassium in past 12 months    Recent Labs   Lab Test  05/15/18   1037   POTASSIUM  4.9                    metFORMIN (GLUCOPHAGE) 500 MG tablet [Pharmacy Med Name: METFORMIN HCL 500MG TABS] 360 tablet 0    Last Written Prescription Date:  4/20/18  Last Fill Quantity: 360,  # refills: 0   Last office visit: 5/15/2018 with prescribing provider:  5/15/18   Future Office Visit:     Sig: TAKE TWO TABLETS BY MOUTH TWO TIMES A DAY    Biguanide Agents Failed    10/2/2018  8:57 AM       Failed - Patient has documented LDL within the past 12 mos.    Recent Labs   Lab Test  04/11/17   0909   LDL  43            Failed - Patient has had a Microalbumin in the past 15 mos.    Recent Labs   Lab Test  04/11/17   0920   MICROL  47   UMALCR  43.61*            Passed - Blood pressure less than 140/90 in past 6 months    BP Readings from Last 3 Encounters:   05/15/18 108/58   05/02/18 141/71   01/30/18 120/62                Passed - Patient is age 10 or older       Passed - Patient has documented A1c within the specified period of time.    If " "HgbA1C is 8 or greater, it needs to be on file within the past 3 months.  If less than 8, must be on file within the past 6 months.     Recent Labs   Lab Test  05/15/18   1037   A1C  6.3*            Passed - Patient's CR is NOT>1.4 OR Patient's EGFR is NOT<45 within past 12 mos.    Recent Labs   Lab Test  05/15/18   1037   GFRESTIMATED  50*   GFRESTBLACK  60*       Recent Labs   Lab Test  05/15/18   1037   CR  1.38*            Passed - Patient does NOT have a diagnosis of CHF.       Passed - Recent (6 mo) or future (30 days) visit within the authorizing provider's specialty    Patient had office visit in the last 6 months or has a visit in the next 30 days with authorizing provider or within the authorizing provider's specialty.  See \"Patient Info\" tab in inbasket, or \"Choose Columns\" in Meds & Orders section of the refill encounter.            "

## 2018-10-02 NOTE — TELEPHONE ENCOUNTER
Routing refill request to provider for review/approval because:  Labs out of range:  Microalbumin, Creatinine  Labs not current:  LDL, Microalbumin    THOMAS Espinal, RN  [facility]

## 2018-10-11 ENCOUNTER — TELEPHONE (OUTPATIENT)
Dept: INTERNAL MEDICINE | Facility: OTHER | Age: 78
End: 2018-10-11

## 2018-10-11 NOTE — TELEPHONE ENCOUNTER
Reason for Call:  Same Day Appointment, Requested Provider:  Porfirio Burleson DO    PCP: Porfirio Burleson    Reason for visit: back/kidney pain    Duration of symptoms: this week    Have you been treated for this in the past? No    Additional comments:     Can we leave a detailed message on this number? YES    Phone number patient can be reached at:     Best Time:     Call taken on 10/11/2018 at 3:24 PM by Ally Bah

## 2018-10-12 ENCOUNTER — OFFICE VISIT (OUTPATIENT)
Dept: FAMILY MEDICINE | Facility: OTHER | Age: 78
End: 2018-10-12
Payer: COMMERCIAL

## 2018-10-12 VITALS
RESPIRATION RATE: 16 BRPM | DIASTOLIC BLOOD PRESSURE: 58 MMHG | OXYGEN SATURATION: 99 % | HEIGHT: 69 IN | HEART RATE: 81 BPM | TEMPERATURE: 96.9 F | BODY MASS INDEX: 32.79 KG/M2 | SYSTOLIC BLOOD PRESSURE: 116 MMHG | WEIGHT: 221.4 LBS

## 2018-10-12 DIAGNOSIS — N18.2 TYPE 2 DIABETES MELLITUS WITH STAGE 2 CHRONIC KIDNEY DISEASE, WITH LONG-TERM CURRENT USE OF INSULIN (H): Primary | ICD-10-CM

## 2018-10-12 DIAGNOSIS — S39.012A STRAIN OF LUMBAR REGION, INITIAL ENCOUNTER: ICD-10-CM

## 2018-10-12 DIAGNOSIS — E78.5 HYPERLIPIDEMIA LDL GOAL <100: ICD-10-CM

## 2018-10-12 DIAGNOSIS — I10 HYPERTENSION GOAL BP (BLOOD PRESSURE) < 140/90: ICD-10-CM

## 2018-10-12 DIAGNOSIS — Z79.4 TYPE 2 DIABETES MELLITUS WITH STAGE 2 CHRONIC KIDNEY DISEASE, WITH LONG-TERM CURRENT USE OF INSULIN (H): Primary | ICD-10-CM

## 2018-10-12 DIAGNOSIS — I25.9 CHRONIC ISCHEMIC HEART DISEASE: ICD-10-CM

## 2018-10-12 DIAGNOSIS — E11.22 TYPE 2 DIABETES MELLITUS WITH STAGE 2 CHRONIC KIDNEY DISEASE, WITH LONG-TERM CURRENT USE OF INSULIN (H): Primary | ICD-10-CM

## 2018-10-12 DIAGNOSIS — Z95.5 S/P CORONARY ARTERY STENT PLACEMENT: ICD-10-CM

## 2018-10-12 DIAGNOSIS — R60.1 GENERALIZED EDEMA: ICD-10-CM

## 2018-10-12 LAB
CREAT UR-MCNC: 125 MG/DL
HBA1C MFR BLD: 6.6 % (ref 0–5.6)
MICROALBUMIN UR-MCNC: 34 MG/L
MICROALBUMIN/CREAT UR: 27.6 MG/G CR (ref 0–17)
TSH SERPL DL<=0.005 MIU/L-ACNC: 1.75 MU/L (ref 0.4–4)

## 2018-10-12 PROCEDURE — 99207 C FOOT EXAM  NO CHARGE: CPT | Performed by: INTERNAL MEDICINE

## 2018-10-12 PROCEDURE — 84443 ASSAY THYROID STIM HORMONE: CPT | Performed by: INTERNAL MEDICINE

## 2018-10-12 PROCEDURE — 83036 HEMOGLOBIN GLYCOSYLATED A1C: CPT | Performed by: INTERNAL MEDICINE

## 2018-10-12 PROCEDURE — 99214 OFFICE O/P EST MOD 30 MIN: CPT | Performed by: INTERNAL MEDICINE

## 2018-10-12 PROCEDURE — 82043 UR ALBUMIN QUANTITATIVE: CPT | Performed by: INTERNAL MEDICINE

## 2018-10-12 PROCEDURE — 36415 COLL VENOUS BLD VENIPUNCTURE: CPT | Performed by: INTERNAL MEDICINE

## 2018-10-12 RX ORDER — MUPIROCIN 20 MG/G
1 OINTMENT TOPICAL
COMMUNITY
End: 2020-10-30

## 2018-10-12 RX ORDER — POTASSIUM CHLORIDE 750 MG/1
10 TABLET, EXTENDED RELEASE ORAL DAILY
Qty: 90 TABLET | Refills: 0 | Status: SHIPPED | OUTPATIENT
Start: 2018-10-12 | End: 2019-01-28

## 2018-10-12 ASSESSMENT — PAIN SCALES - GENERAL: PAINLEVEL: MILD PAIN (3)

## 2018-10-12 NOTE — PROGRESS NOTES
SUBJECTIVE:   Pj Zhao is a 77 year old male who presents to clinic today for the following health issues:        FLANK PAIN     Onset: 10/07/2018    Description:   Character: Sharp  Location: right flank  Radiation:  Lower back    Intensity: moderate, severe    Progression of Symptoms:  same    Accompanying Signs & Symptoms:  Fever/Chills?: no   Gas/Bloating: no   Nausea: no   Vomitting: no   Diarrhea?: no   Constipation:no   Dysuria or Hematuria: no    History:   Trauma: no   Previous similar pain: no    Previous tests done: none    Precipitating factors:   Does the pain change with:     Food: no      BM: no     Urination: no     Alleviating factors:  Tylenol    Therapies Tried and outcome: Tylenol helped     LMP:  not applicable     Concern: Questions on gabapentin and metformin                    Chief Complaint         The patient is a pleasant 77-year-old gentleman who presents today with some right flank pain and pain in the lower back.  He describes as moderate to intense.  It is not associated with any urinary changes, bowel changes, or materia.  He notes no recent trauma or injury.  He notes that the pain is worse with any motion, twisting or bending.  It appears to be initially quite musculoskeletal.  He does have concurrent history of type 2 diabetes as well as chronic ischemic heart disease.  He notes that he has been compliant with his medications, he has lost intentionally about 10 pounds through diet and exercise.  He notes no recent chest discomfort, he is supposed stent placement in 2004.  His most recent glycosylated hemoglobin was 6.3 in May.  With respect to his back pain, he notes no pain radiating down the legs.  He notes no weakness or difficulty walking.                       PAST, FAMILY,SOCIAL HISTORY:     Medical  History:   has a past medical history of CKD (chronic kidney disease) stage 1, GFR 90 ml/min or greater; CKD (chronic kidney disease) stage 2, GFR 60-89 ml/min  (10/26/2015); Diabetic eye exam (H) (10/19/13, 11/25/14); Impotence of organic origin; Microalbuminuria (3/7/2011); NONSPECIFIC MEDICAL HISTORY (1977); Obesity, Class I, BMI 30-34.9 (10/26/2015); Other specified disease of nail; 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) (10/31/2010); Type II or unspecified type diabetes mellitus without mention of complication, not stated as uncontrolled; Unspecified essential hypertension; and Urinary calculus, unspecified (1990).     Surgical History:   has a past surgical history that includes NONSPECIFIC PROCEDURE; REMOVAL OF TONSILS,<13 Y/O; NONSPECIFIC PROCEDURE (3/11/2003); APPENDECTOMY (3/11/2003); colonoscopy (03/06/07); Colonoscopy (6/28/2011); Phacoemulsification with standard intraocular lens implant (Right, 1/15/2015); Phacoemulsification with standard intraocular lens implant (Left, 2/19/2015); and Cardiac surgery (05/2018).     Social History:   reports that he has never smoked. He has never used smokeless tobacco. He reports that he does not drink alcohol or use illicit drugs.     Family History:  family history includes Cancer in his sister; Diabetes in his mother; Obesity in his mother.            MEDICATIONS  Current Outpatient Prescriptions   Medication Sig Dispense Refill     acetaminophen (TYLENOL) 325 MG tablet Take 650 mg by mouth       amLODIPine (NORVASC) 5 MG tablet TAKE ONE TABLET BY MOUTH ONCE DAILY 90 tablet 3     ASPIRIN 325 MG OR TBEC 1 tab po QD (Once per day)       atorvastatin (LIPITOR) 40 MG tablet Take 1 tablet (40 mg) by mouth daily 30 tablet 3     B-D U/F 31G X 8 MM insulin pen needle AS DIRECTED 100 each 8     furosemide (LASIX) 20 MG tablet Take 1 tablet (20 mg) by mouth daily 30 tablet 3     gabapentin (NEURONTIN) 300 MG capsule Take 1 capsule (300 mg) by mouth At Bedtime 90 capsule 0     ketoconazole (NIZORAL) 2 % cream APPLY TO AFFECTED AREA(S) TOPICALLY AS DIRECTED 15 g 0     lisinopril (PRINIVIL/ZESTRIL) 20 MG  tablet TAKE ONE TABLET BY MOUTH TWICE A DAY 60 tablet 5     metFORMIN (GLUCOPHAGE) 500 MG tablet TAKE TWO TABLETS BY MOUTH TWO TIMES A  tablet 0     mupirocin (BACTROBAN) 2 % ointment Apply 1 Application topically       nitroGLYcerin (NITROSTAT) 0.4 MG sublingual tablet Place 1 tablet (0.4 mg) under the tongue See Admin Instructions for chest pain 25 tablet 3     NOVOLOG FLEXPEN 100 UNIT/ML soln TAKE 15 UNITS BEFORE BREAKFAST, 18 UNITS BEFORE LUNCH AND 18 UNITS BEFORE DINNER PLUS USE OF MED DOSE SLIDING SCALE AS DIRECTED 30 mL 3     ONETOUCH ULTRA test strip USE TO TEST FOUR TIMES A  each 5     potassium chloride SA (K-DUR/KLOR-CON M) 10 MEQ CR tablet Take 1 tablet (10 mEq) by mouth daily 90 tablet 0     hydrochlorothiazide (HYDRODIURIL) 25 MG tablet TAKE ONE TABLET BY MOUTH ONCE DAILY (Patient not taking: Reported on 5/15/2018) 90 tablet 1     IBUPROFEN PO Take 600 mg by mouth       LANTUS SOLOSTAR 100 UNIT/ML soln INJECT 50 UNITS SUBCUTANEOUSLY ONCE DAILY 15 mL 3     meclizine (ANTIVERT) 25 MG tablet Take 1 tablet (25 mg) by mouth every 6 hours as needed for dizziness (Patient not taking: Reported on 5/15/2018) 30 tablet 1     metoprolol tartrate (LOPRESSOR) 50 MG tablet TAKE ONE TABLET BY MOUTH TWICE A  tablet 0         --------------------------------------------------------------------------------------------------------------------                              Review of Systems     LUNGS: Pt denies: cough,excess sputum, hemoptysis, or shortness of breath.   HEART: Pt denies: chest pain, arrythmia, syncope, tachy or bradyarrhythmia.  Does have occasional edema in the lower extremities.   GI: Pt denies: nausea, vomitting, diarrhea, constipation, melena, or hematochezia.   NEURO: Pt denies: seizures, strokes, diplopia, weakness, paraesthesias, or paralysis.  Paresthesias are controlled with the use of the gabapentin.   MS: Pt denies: significant peripheral joint pain, swelling, or acute loss  "of function. Able to ambulate with little or no assistance.  Has the back discomfort as noted.  Been present for about 2 days, it is not associated with injury.   PSYCH: The patient denies significant depression, anxiety, mood imbalance. Specifically denies any suicidal ideation.                                     Examination      /58 (BP Location: Left arm, Patient Position: Chair, Cuff Size: Adult Large)  Pulse 81  Temp 96.9  F (36.1  C) (Temporal)  Resp 16  Ht 5' 8.5\" (1.74 m)  Wt 221 lb 6.4 oz (100.4 kg)  SpO2 99%  BMI 33.17 kg/m2   Constitutional: The patient appears to be in mild to moderate acute distress. The patient appears to be adequately hydrated. No acute respiratory or hemodynamic distress is noted at this time.   LUNGS: clear bilaterally, airflow is brisk, no intercostal retraction or stridor is noted. No coughing is noted during visit.   HEART:  regular without rubs, clicks, gallops, or murmurs. PMI is nondisplaced. Upstrokes are brisk. S1,S2 are heard.   GI: Abdomen is soft, without rebound, guarding or tenderness. Bowel sounds are appropriate. No renal bruits are heard.   NEURO: Pt is alert and appropriate. No neurologic lateralization is noted. Cranial nerves 2-12 are intact. Peripheral sensory and motor function are grossly normal.    SKIN:  warm and dry. No erythema, or rashes are noted. No specific lesions of concern are noted.    Feet: no evidence of skin breakdown or ulceration is noted.  Sensation is decreased to monofilament and vibration.  Pulses are strong, capillary refill is brisk.                                        Decision Making    1. Strain of lumbar region, initial encounter  Moist heat, rest, continue gabapentin.    2. Type 2 diabetes mellitus with stage 2 chronic kidney disease, with long-term current use of insulin (H)  Continue metformin, Lantus background, NovoLog preprandial dosage.  Continue aspirin, statin, ACE inhibitor.  Continue caloric restriction of " carbohydrates  - Albumin Random Urine Quantitative with Creat Ratio  - Hemoglobin A1c  - FOOT EXAM    3. Generalized edema  Controlled with diuretic, continue potassium supplementation and follow  - potassium chloride SA (K-DUR/KLOR-CON M) 10 MEQ CR tablet; Take 1 tablet (10 mEq) by mouth daily  Dispense: 90 tablet; Refill: 0    4. Chronic ischemic heart disease  Currently stable, continue beta-blocker and diuretic as well as ACE inhibitor and statin    5. S/P coronary artery stent placement LAD in 2004  See #4 above    6. Hypertension goal BP (blood pressure) < 140/90  Controlled, check thyroid  - TSH with free T4 reflex    7. Hyperlipidemia LDL goal <100  Continue statin and low-fat diet.                             FOLLOW UP   I have asked the patient to make an appointment for followup with me in 2 weeks if but not markedly improved or as predicated by results        I have carefully explained the diagnosis and treatment options to the patient.  The patient has displayed an understanding of the above, and all subsequent questions were answered.      DO CAREN Phillips    Portions of this note were produced using [employer]  Although every attempt at real-time proof reading has been made, occasional grammar/syntax errors may have been missed.

## 2018-10-12 NOTE — LETTER
October 18, 2018      Pj Zhao  [id number]   [place] [id number]        Dear ,    We are writing to inform you of your test results.    Your test results fall within the expected range(s) or remain unchanged from previous results.  Please continue with current treatment plan.    Resulted Orders   TSH with free T4 reflex   Result Value Ref Range    TSH 1.75 0.40 - 4.00 mU/L   Albumin Random Urine Quantitative with Creat Ratio   Result Value Ref Range    Creatinine Urine 125 mg/dL    Albumin Urine mg/L 34 mg/L    Albumin Urine mg/g Cr 27.60 (H) 0 - 17 mg/g Cr   Hemoglobin A1c   Result Value Ref Range    Hemoglobin A1C 6.6 (H) 0 - 5.6 %      Comment:      Normal <5.7% Prediabetes 5.7-6.4%  Diabetes 6.5% or higher - adopted from ADA   consensus guidelines.         If you have any questions or concerns, please call the clinic at the number listed above.       Sincerely,        Porfirio Burleson, DO

## 2018-10-12 NOTE — MR AVS SNAPSHOT
"              After Visit Summary   10/12/2018    Pj Zhao    MRN: [id number]           Patient Information     Date Of Birth          1940        Visit Information        Provider Department      10/12/2018 3:20 PM Porfirio Burleson DO [facility]        Today's Diagnoses     Type 2 diabetes mellitus with stage 2 chronic kidney disease, with long-term current use of insulin (H)    -  1    Strain of lumbar region, initial encounter        Generalized edema        Chronic ischemic heart disease        S/P coronary artery stent placement LAD in 2004        Hypertension goal BP (blood pressure) < 140/90        Hyperlipidemia LDL goal <100           Follow-ups after your visit        Follow-up notes from your care team     Return in about 2 weeks (around 10/26/2018) for follow up.      Who to contact     If you have questions or need follow up information about today's clinic visit or your schedule please contact [facility] directly at [phone].  Normal or non-critical lab and imaging results will be communicated to you by MyChart, letter or phone within 4 business days after the clinic has received the results. If you do not hear from us within 7 days, please contact the clinic through MyChart or phone. If you have a critical or abnormal lab result, we will notify you by phone as soon as possible.  Submit refill requests through [email or web address] or call your pharmacy and they will forward the refill request to us. Please allow 3 business days for your refill to be completed.          Additional Information About Your Visit        Care EveryWhere ID     This is your Care EveryWhere ID. This could be used by other organizations to access your [place] medical records  [id number]        Your Vitals Were     Pulse Temperature Respirations Height Pulse Oximetry BMI (Body Mass Index)    81 96.9  F (36.1  C) (Temporal) 16 5' 8.5\" (1.74 m) 99% 33.17 kg/m2       Blood Pressure from " Last 3 Encounters:   10/12/18 116/58   05/15/18 108/58   05/02/18 141/71    Weight from Last 3 Encounters:   10/12/18 221 lb 6.4 oz (100.4 kg)   05/02/18 233 lb (105.7 kg)   01/30/18 226 lb 12.8 oz (102.9 kg)              We Performed the Following     Albumin Random Urine Quantitative with Creat Ratio     FOOT EXAM     Hemoglobin A1c     TSH with free T4 reflex          Today's Medication Changes          These changes are accurate as of 10/12/18 11:59 PM.  If you have any questions, ask your nurse or doctor.               These medicines have changed or have updated prescriptions.        Dose/Directions    potassium chloride SA 10 MEQ CR tablet   Commonly known as:  K-DUR/KLOR-CON M   This may have changed:  See the new instructions.   Used for:  Generalized edema   Changed by:  Porfirio Burleson DO        Dose:  10 mEq   Take 1 tablet (10 mEq) by mouth daily   Quantity:  90 tablet   Refills:  0            Where to get your medicines      These medications were [address]  [address] [facility] [address]     Phone:  [phone]     potassium chloride SA 10 MEQ CR tablet                Primary Care Provider Office Phone # Fax #    Porfirio Burleson [phone]       [address] [facility]   [facility] [place] [address]        Equal Access to Services     MARSHALL HOPE AH: Hadii aad ku hadasho Soomaali, waaxda luqadaha, qaybta kaalmada adeegyada, mimi monk. So [facility] [place] [facility] [phone].    ATENCIÓN: Si habla español, tiene a zelaya disposición servicios gratuitos de asistencia lingüística. Vanessa al [phone].    We comply with applicable federal civil rights laws and [place] laws. We do not discriminate on the basis of race, color, national origin, age, disability, sex, sexual orientation, or gender identity.            Thank you!     Thank you for choosing [facility]  for your care. Our goal is always to provide  you with excellent care. Hearing back from our patients is one way we can continue to improve our services. Please take a few minutes to complete the written survey that you may receive in the mail after your visit with us. Thank you!             Your Updated Medication List - Protect others around you: Learn how to safely use, store and throw away your medicines at www.disposemymeds.org.          This list is accurate as of 10/12/18 11:59 PM.  Always use your most recent med list.                   Brand Name Dispense Instructions for use Diagnosis    acetaminophen 325 MG tablet    TYLENOL     Take 650 mg by mouth        amLODIPine 5 MG tablet    NORVASC    90 tablet    TAKE ONE TABLET BY MOUTH ONCE DAILY    Hypertension goal BP (blood pressure) < 140/90       aspirin 325 MG EC tablet      1 tab po QD (Once per day)        atorvastatin 40 MG tablet    LIPITOR    30 tablet    Take 1 tablet (40 mg) by mouth daily    Hyperlipidemia LDL goal <100       B-D U/F 31G X 8 MM   Generic drug:  insulin pen needle     100 each    AS DIRECTED    Type 2 diabetes mellitus with complication, unspecified long term insulin use status       furosemide 20 MG tablet    LASIX    30 tablet    Take 1 tablet (20 mg) by mouth daily    Generalized edema       gabapentin 300 MG capsule    NEURONTIN    90 capsule    Take 1 capsule (300 mg) by mouth At Bedtime    Type 2 diabetes mellitus with stage 2 chronic kidney disease, without long-term current use of insulin (H)       hydrochlorothiazide 25 MG tablet    HYDRODIURIL    90 tablet    TAKE ONE TABLET BY MOUTH ONCE DAILY    Hypertension goal BP (blood pressure) < 140/90       IBUPROFEN PO      Take 600 mg by mouth        ketoconazole 2 % cream    NIZORAL    15 g    APPLY TO AFFECTED AREA(S) TOPICALLY AS DIRECTED    Tinea corporis       lisinopril 20 MG tablet    PRINIVIL/ZESTRIL    60 tablet    TAKE ONE TABLET BY MOUTH TWICE A DAY    Chronic kidney disease, stage I       meclizine 25 MG tablet     ANTIVERT    30 tablet    Take 1 tablet (25 mg) by mouth every 6 hours as needed for dizziness    BPPV (benign paroxysmal positional vertigo), bilateral       metFORMIN 500 MG tablet    GLUCOPHAGE    360 tablet    TAKE TWO TABLETS BY MOUTH TWO TIMES A DAY    Type 2 diabetes mellitus with stage 2 chronic kidney disease, without long-term current use of insulin (H)       mupirocin 2 % ointment    BACTROBAN     Apply 1 Application topically        nitroGLYcerin 0.4 MG sublingual tablet    NITROSTAT    25 tablet    Place 1 tablet (0.4 mg) under the tongue See Admin Instructions for chest pain    Type 2 diabetes mellitus with stage 2 chronic kidney disease, without long-term current use of insulin (H)       NovoLOG FLEXPEN 100 UNIT/ML injection   Generic drug:  insulin aspart     30 mL    TAKE 15 UNITS BEFORE BREAKFAST, 18 UNITS BEFORE LUNCH AND 18 UNITS BEFORE DINNER PLUS USE OF MED DOSE SLIDING SCALE AS DIRECTED    Type 2 diabetes mellitus with stage 2 chronic kidney disease, with long-term current use of insulin (H)       ONETOUCH ULTRA test strip   Generic drug:  blood glucose monitoring     100 each    USE TO TEST FOUR TIMES A DAY    Type 2 diabetes mellitus with hyperglycemia (H)       potassium chloride SA 10 MEQ CR tablet    K-DUR/KLOR-CON M    90 tablet    Take 1 tablet (10 mEq) by mouth daily    Generalized edema

## 2018-10-15 ENCOUNTER — TRANSFERRED RECORDS (OUTPATIENT)
Dept: HEALTH INFORMATION MANAGEMENT | Facility: CLINIC | Age: 78
End: 2018-10-15

## 2018-10-15 DIAGNOSIS — E11.22 TYPE 2 DIABETES MELLITUS WITH STAGE 2 CHRONIC KIDNEY DISEASE, WITH LONG-TERM CURRENT USE OF INSULIN (H): ICD-10-CM

## 2018-10-15 DIAGNOSIS — I10 HYPERTENSION GOAL BP (BLOOD PRESSURE) < 140/90: ICD-10-CM

## 2018-10-15 DIAGNOSIS — N18.2 TYPE 2 DIABETES MELLITUS WITH STAGE 2 CHRONIC KIDNEY DISEASE, WITH LONG-TERM CURRENT USE OF INSULIN (H): ICD-10-CM

## 2018-10-15 DIAGNOSIS — Z79.4 TYPE 2 DIABETES MELLITUS WITH STAGE 2 CHRONIC KIDNEY DISEASE, WITH LONG-TERM CURRENT USE OF INSULIN (H): ICD-10-CM

## 2018-10-17 NOTE — PROGRESS NOTES
Please contact the patient and notify him of the following:  The microalbumin has improved.  Thyroid is well adjusted.  The hemoglobin A1c is steady at 6.6 suggesting good blood sugar management.    Thank you.   DO CAREN Phillips

## 2018-10-17 NOTE — TELEPHONE ENCOUNTER
"Requested Prescriptions   Pending Prescriptions Disp Refills     LANTUS SOLOSTAR 100 UNIT/ML soln [Pharmacy Med Name: LANTUS SOLOSTAR 100U/ML SPN] 15 mL 3     Sig: INJECT 50 UNITS SUBCUTANEOUSLY ONCE DAILY    Long Acting Insulin Protocol Failed    10/15/2018  4:28 PM       Failed - LDL on file in past 12 months    Recent Labs   Lab Test  04/11/17   0909   LDL  43            Passed - Blood pressure less than 140/90 in past 6 months    BP Readings from Last 3 Encounters:   10/12/18 116/58   05/15/18 108/58   05/02/18 141/71                Passed - Microalbumin on file in past 12 months    Recent Labs   Lab Test  10/12/18   1630   MICROL  34   UMALCR  27.60*            Passed - Serum creatinine on file in past 12 months    Recent Labs   Lab Test  05/15/18   1037   CR  1.38*            Passed - HgbA1C in past 3 or 6 months    If HgbA1C is 8 or greater, it needs to be on file within the past 3 months.  If less than 8, must be on file within the past 6 months.     Recent Labs   Lab Test  10/12/18   1616   A1C  6.6*            Passed - Patient is age 18 or older       Passed - Recent (6 mo) or future (30 days) visit within the authorizing provider's specialty    Patient had office visit in the last 6 months or has a visit in the next 30 days with authorizing provider or within the authorizing provider's specialty.  See \"Patient Info\" tab in inbasket, or \"Choose Columns\" in Meds & Orders section of the refill encounter.            metoprolol tartrate (LOPRESSOR) 50 MG tablet [Pharmacy Med Name: METOPROLOL TARTRATE 50MG TABS] 180 tablet 0     Sig: TAKE ONE TABLET BY MOUTH TWICE A DAY    Beta-Blockers Protocol Passed    10/15/2018  4:28 PM       Passed - Blood pressure under 140/90 in past 12 months    BP Readings from Last 3 Encounters:   10/12/18 116/58   05/15/18 108/58   05/02/18 141/71                Passed - Patient is age 6 or older       Passed - Recent (12 mo) or future (30 days) visit within the authorizing " "provider's specialty    Patient had office visit in the last 12 months or has a visit in the next 30 days with authorizing provider or within the authorizing provider's specialty.  See \"Patient Info\" tab in inbasket, or \"Choose Columns\" in Meds & Orders section of the refill encounter.              Lantus:  Last Written Prescription Date:  4/10/18  Last Fill Quantity: 15 ml,  # refills: 3   Last office visit: 10/12/2018 with prescribing provider:     Future Office Visit:    Patient has been seen in the past 30 days.  Routing to PCP to advise.    Lopressor:  Last Written Prescription Date:  7/24/18  Last Fill Quantity: 180,  # refills: 0   Last office visit: 10/12/2018 with prescribing provider:     Future Office Visit:    Patient has been seen in the past 30 days.  Routing to PCP to advise.    Geovanna Palmer, CHICON, RN      "

## 2018-10-18 RX ORDER — METOPROLOL TARTRATE 50 MG
TABLET ORAL
Qty: 180 TABLET | Refills: 0 | Status: SHIPPED | OUTPATIENT
Start: 2018-10-18 | End: 2019-01-14

## 2018-10-18 RX ORDER — INSULIN GLARGINE 100 [IU]/ML
INJECTION, SOLUTION SUBCUTANEOUS
Qty: 15 ML | Refills: 3 | Status: SHIPPED | OUTPATIENT
Start: 2018-10-18 | End: 2019-02-12

## 2018-10-22 DIAGNOSIS — E11.22 TYPE 2 DIABETES MELLITUS WITH STAGE 2 CHRONIC KIDNEY DISEASE, WITH LONG-TERM CURRENT USE OF INSULIN (H): ICD-10-CM

## 2018-10-22 DIAGNOSIS — Z79.4 TYPE 2 DIABETES MELLITUS WITH STAGE 2 CHRONIC KIDNEY DISEASE, WITH LONG-TERM CURRENT USE OF INSULIN (H): ICD-10-CM

## 2018-10-22 DIAGNOSIS — N18.2 TYPE 2 DIABETES MELLITUS WITH STAGE 2 CHRONIC KIDNEY DISEASE, WITH LONG-TERM CURRENT USE OF INSULIN (H): ICD-10-CM

## 2018-10-23 RX ORDER — INSULIN GLARGINE 100 [IU]/ML
INJECTION, SOLUTION SUBCUTANEOUS
Qty: 15 ML | Refills: 3 | OUTPATIENT
Start: 2018-10-23

## 2018-10-23 NOTE — TELEPHONE ENCOUNTER
"Requested Prescriptions   Pending Prescriptions Disp Refills     LANTUS SOLOSTAR 100 UNIT/ML soln [Pharmacy Med Name: LANTUS SOLOSTAR 100U/ML SPN] 15 mL 3    Last Written Prescription Date:  10/18/18  Last Fill Quantity: 15 mL,  # refills: 3   Last office visit: 10/12/2018 with prescribing provider:  10/12/18   Future Office Visit:     Sig: INJECT 50 UNITS SUBCUTANEOUSLY ONCE DAILY    Long Acting Insulin Protocol Failed    10/22/2018 10:27 AM       Failed - LDL on file in past 12 months    Recent Labs   Lab Test  04/11/17   0909   LDL  43            Passed - Blood pressure less than 140/90 in past 6 months    BP Readings from Last 3 Encounters:   10/12/18 116/58   05/15/18 108/58   05/02/18 141/71                Passed - Microalbumin on file in past 12 months    Recent Labs   Lab Test  10/12/18   1630   MICROL  34   UMALCR  27.60*            Passed - Serum creatinine on file in past 12 months    Recent Labs   Lab Test  05/15/18   1037   CR  1.38*            Passed - HgbA1C in past 3 or 6 months    If HgbA1C is 8 or greater, it needs to be on file within the past 3 months.  If less than 8, must be on file within the past 6 months.     Recent Labs   Lab Test  10/12/18   1616   A1C  6.6*            Passed - Patient is age 18 or older       Passed - Recent (6 mo) or future (30 days) visit within the authorizing provider's specialty    Patient had office visit in the last 6 months or has a visit in the next 30 days with authorizing provider or within the authorizing provider's specialty.  See \"Patient Info\" tab in inbasket, or \"Choose Columns\" in Meds & Orders section of the refill encounter.            "

## 2018-11-27 ENCOUNTER — TELEPHONE (OUTPATIENT)
Dept: FAMILY MEDICINE | Facility: CLINIC | Age: 78
End: 2018-11-27

## 2018-11-27 DIAGNOSIS — E11.22 TYPE 2 DIABETES MELLITUS WITH STAGE 2 CHRONIC KIDNEY DISEASE, WITHOUT LONG-TERM CURRENT USE OF INSULIN (H): ICD-10-CM

## 2018-11-27 DIAGNOSIS — R60.1 GENERALIZED EDEMA: ICD-10-CM

## 2018-11-27 DIAGNOSIS — N18.2 TYPE 2 DIABETES MELLITUS WITH STAGE 2 CHRONIC KIDNEY DISEASE, WITHOUT LONG-TERM CURRENT USE OF INSULIN (H): ICD-10-CM

## 2018-11-27 RX ORDER — GABAPENTIN 300 MG/1
CAPSULE ORAL
Qty: 90 CAPSULE | Refills: 0 | Status: SHIPPED | OUTPATIENT
Start: 2018-11-27 | End: 2018-11-30

## 2018-11-27 NOTE — TELEPHONE ENCOUNTER
Gabapentin      Last Written Prescription Date:  9/20/2018  Last Fill Quantity: 90,   # refills: 0  Last Office Visit: 10/12/2018  Future Office visit:       Routing refill request to provider for review/approval because:  Drug not on the [facility], [facility] or [facility] refill protocol or controlled substance

## 2018-11-29 RX ORDER — FUROSEMIDE 20 MG
TABLET ORAL
Qty: 30 TABLET | Refills: 3 | Status: SHIPPED | OUTPATIENT
Start: 2018-11-29 | End: 2019-04-01

## 2018-11-29 NOTE — TELEPHONE ENCOUNTER
"Requested Prescriptions   Pending Prescriptions Disp Refills     furosemide (LASIX) 20 MG tablet [Pharmacy Med Name: FUROSEMIDE 20MG TABS] 30 tablet 3    Last Written Prescription Date:  8/7/18  Last Fill Quantity: 30,  # refills: 3   Last office visit: 10/12/2018 with prescribing provider:  10/12/18   Future Office Visit:     Sig: TAKE ONE TABLET BY MOUTH ONCE DAILY    Diuretics (Including Combos) Protocol Failed    11/27/2018  1:21 PM       Failed - Normal serum creatinine on file in past 12 months    Recent Labs   Lab Test  05/15/18   1037   CR  1.38*             Passed - Blood pressure under 140/90 in past 12 months    BP Readings from Last 3 Encounters:   10/12/18 116/58   05/15/18 108/58   05/02/18 141/71                Passed - Recent (12 mo) or future (30 days) visit within the authorizing provider's specialty    Patient had office visit in the last 12 months or has a visit in the next 30 days with authorizing provider or within the authorizing provider's specialty.  See \"Patient Info\" tab in inbasket, or \"Choose Columns\" in Meds & Orders section of the refill encounter.             Passed - Patient is age 18 or older       Passed - Normal serum potassium on file in past 12 months    Recent Labs   Lab Test  05/15/18   1037   POTASSIUM  4.9                   Passed - Normal serum sodium on file in past 12 months    Recent Labs   Lab Test  05/15/18   1037   NA  138              "

## 2018-11-29 NOTE — TELEPHONE ENCOUNTER
Routing refill request to provider for review/approval because:  Labs out of range:  Creatinine    CHICO EspinalN, RN  [facility]

## 2018-11-30 RX ORDER — GABAPENTIN 300 MG/1
CAPSULE ORAL
Qty: 180 CAPSULE | Refills: 0 | Status: SHIPPED | OUTPATIENT
Start: 2018-11-30 | End: 2018-12-12

## 2018-11-30 NOTE — TELEPHONE ENCOUNTER
New RX peneded.     Routing refill request to provider for review/approval because:  Drug not on the [facility] refill protocol     THOMAS Espinal, RN  [facility]

## 2018-11-30 NOTE — TELEPHONE ENCOUNTER
Pt stated he is taking 2 gabapentin at bedtime.(This was discussed at LOV per pt). Pt is out of meds. Pharmacy needs a new script. Pts spouse will be stopping at the Pharmacy later this morning.

## 2019-01-02 DIAGNOSIS — E78.5 HYPERLIPIDEMIA LDL GOAL <100: ICD-10-CM

## 2019-01-03 NOTE — TELEPHONE ENCOUNTER
"Lipitor  Last Written Prescription Date:  08/07/2018  Last Fill Quantity: 30,  # refills: 3   Last office visit: 10/12/2018 with prescribing provider:  Benjy   Future Office Visit:  None  Routing refill request to provider for review/approval because:  Labs not current:  Lipitor    Requested Prescriptions   Pending Prescriptions Disp Refills     atorvastatin (LIPITOR) 40 MG tablet [Pharmacy Med Name: ATORVASTATIN 40MG TABS] 30 tablet 3     Sig: TAKE ONE TABLET BY MOUTH ONCE DAILY    Statins Protocol Failed - 1/2/2019  8:50 AM       Failed - LDL on file in past 12 months    Recent Labs   Lab Test 04/11/17  0909   LDL 43          Passed - No abnormal creatine kinase in past 12 months    No lab results found.        Passed - Recent (12 mo) or future (30 days) visit within the authorizing provider's specialty    Patient had office visit in the last 12 months or has a visit in the next 30 days with authorizing provider or within the authorizing provider's specialty.  See \"Patient Info\" tab in inbasket, or \"Choose Columns\" in Meds & Orders section of the refill encounter.         Passed - Patient is age 18 or older      Justyna Weaver RN   "

## 2019-01-04 RX ORDER — ATORVASTATIN CALCIUM 40 MG/1
TABLET, FILM COATED ORAL
Qty: 30 TABLET | Refills: 3 | Status: SHIPPED | OUTPATIENT
Start: 2019-01-04 | End: 2019-04-30

## 2019-01-11 ENCOUNTER — OFFICE VISIT (OUTPATIENT)
Dept: FAMILY MEDICINE | Facility: OTHER | Age: 79
End: 2019-01-11
Payer: COMMERCIAL

## 2019-01-11 VITALS
HEART RATE: 64 BPM | SYSTOLIC BLOOD PRESSURE: 140 MMHG | WEIGHT: 217 LBS | RESPIRATION RATE: 18 BRPM | OXYGEN SATURATION: 98 % | TEMPERATURE: 97.4 F | BODY MASS INDEX: 32.14 KG/M2 | HEIGHT: 69 IN | DIASTOLIC BLOOD PRESSURE: 60 MMHG

## 2019-01-11 DIAGNOSIS — L97.522 SKIN ULCER OF TOE OF LEFT FOOT WITH FAT LAYER EXPOSED (H): ICD-10-CM

## 2019-01-11 DIAGNOSIS — L03.116 CELLULITIS OF LEFT LOWER EXTREMITY: Primary | ICD-10-CM

## 2019-01-11 DIAGNOSIS — C18.9 MALIGNANT NEOPLASM OF COLON, UNSPECIFIED PART OF COLON (H): ICD-10-CM

## 2019-01-11 DIAGNOSIS — I10 HYPERTENSION GOAL BP (BLOOD PRESSURE) < 140/90: ICD-10-CM

## 2019-01-11 DIAGNOSIS — Z79.4 TYPE 2 DIABETES MELLITUS WITH STAGE 2 CHRONIC KIDNEY DISEASE, WITH LONG-TERM CURRENT USE OF INSULIN (H): ICD-10-CM

## 2019-01-11 DIAGNOSIS — N18.2 TYPE 2 DIABETES MELLITUS WITH STAGE 2 CHRONIC KIDNEY DISEASE, WITH LONG-TERM CURRENT USE OF INSULIN (H): ICD-10-CM

## 2019-01-11 DIAGNOSIS — E11.22 TYPE 2 DIABETES MELLITUS WITH STAGE 2 CHRONIC KIDNEY DISEASE, WITH LONG-TERM CURRENT USE OF INSULIN (H): ICD-10-CM

## 2019-01-11 PROCEDURE — 99214 OFFICE O/P EST MOD 30 MIN: CPT | Performed by: INTERNAL MEDICINE

## 2019-01-11 RX ORDER — CEPHALEXIN 500 MG/1
500 CAPSULE ORAL 3 TIMES DAILY
Qty: 30 CAPSULE | Refills: 0 | Status: SHIPPED | OUTPATIENT
Start: 2019-01-11 | End: 2019-01-22

## 2019-01-11 ASSESSMENT — MIFFLIN-ST. JEOR: SCORE: 1684.69

## 2019-01-11 ASSESSMENT — PAIN SCALES - GENERAL: PAINLEVEL: MILD PAIN (3)

## 2019-01-11 NOTE — PROGRESS NOTES
SUBJECTIVE:   jP Zhao is a 78 year old male who presents to clinic today for the following health issues:    Foot Pain    Onset: 2 weeks    Description:   Location: left foot  Character: Sharp    Intensity: severe    Progression of Symptoms: same    Accompanying Signs & Symptoms:  Other symptoms: radiation of pain to lower leg and redness    History:   Previous similar pain: Diabetic neuropathy     Precipitating factors:   Trauma or overuse: no     Alleviating factors:  Improved by: gabapentin    Therapies Tried and outcome: elevation- no relief, topical cream over the counter- mild relief     HPI:  Patient with type II diabetes mellitus presents with ~10day history of left foot pain. Patient describes the pain as dull and achy. Patient states that his left 5th toe is severely painful, and the pain radiates through the entire left foot. Patient states that the pain is at its worst at night when he is trying to sleep.     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, right     Chronic ischemic heart disease     Acute right-sided weakness     Slurring of speech     Obesity, Class I, BMI 30-34.9     CKD (chronic kidney disease) stage 2, GFR 60-89 ml/min     Type 2 diabetes mellitus with stage 2 chronic kidney disease, with long-term current use of insulin (H)     Malignant neoplasm of colon, unspecified part of colon (H), hx of     Vitamin B12 deficiency (non anemic)     Past Surgical  History:   Procedure Laterality Date     C APPENDECTOMY  3/11/2003     C NONSPECIFIC PROCEDURE      Laser surgery for destruction of kidney stones     C NONSPECIFIC PROCEDURE  3/11/2003    Lower anterior sigmoid resection with end-to-end anastomosis.       CARDIAC SURGERY  05/2018     COLONOSCOPY  03/06/07    repeat in 3-5 yrs.     COLONOSCOPY  6/28/2011    Procedure:COMBINED COLONOSCOPY, REMOVE TUMOR/POLYP/LESION BY SNARE; Surgeon:JOANN PATEL; Location:[facility]     HC REMOVAL OF TONSILS,<13 Y/O      Tonsils <10y.o.     PHACOEMULSIFICATION WITH STANDARD INTRAOCULAR LENS IMPLANT Right 1/15/2015    Procedure: PHACOEMULSIFICATION WITH STANDARD INTRAOCULAR LENS IMPLANT;  Surgeon: Jamarcus Ferrer MD;  Location: [facility]     PHACOEMULSIFICATION WITH STANDARD INTRAOCULAR LENS IMPLANT Left 2/19/2015    Procedure: PHACOEMULSIFICATION WITH STANDARD INTRAOCULAR LENS IMPLANT;  Surgeon: Jamarcus Ferrer MD;  Location: [facility]       Social History     Tobacco Use     Smoking status: Never Smoker     Smokeless tobacco: Never Used   Substance Use Topics     Alcohol use: No     Alcohol/week: 0.0 oz     Family History   Problem Relation Age of Onset     Diabetes Mother      Obesity Mother      Cancer Sister         Mouth         Current Outpatient Medications   Medication Sig Dispense Refill     acetaminophen (TYLENOL) 325 MG tablet Take 650 mg by mouth       amLODIPine (NORVASC) 5 MG tablet TAKE ONE TABLET BY MOUTH ONCE DAILY 90 tablet 3     aspirin (ASA) 81 MG tablet Take 81 mg by mouth daily       atorvastatin (LIPITOR) 40 MG tablet TAKE ONE TABLET BY MOUTH ONCE DAILY 30 tablet 3     B-D U/F 31G X 8 MM insulin pen needle AS DIRECTED 100 each 8     cephALEXin (KEFLEX) 500 MG capsule Take 1 capsule (500 mg) by mouth 3 times daily for 10 days 30 capsule 0     furosemide (LASIX) 20 MG tablet TAKE ONE TABLET BY MOUTH ONCE DAILY 30 tablet 3     gabapentin (NEURONTIN) 300 MG capsule TAKE TWO CAPSULES BY MOUTH AT BEDTIME 180  capsule 0     LANTUS SOLOSTAR 100 UNIT/ML soln INJECT 50 UNITS SUBCUTANEOUSLY ONCE DAILY (Patient taking differently: INJECT 35 UNITS SUBCUTANEOUSLY ONCE DAILY) 15 mL 3     lisinopril (PRINIVIL/ZESTRIL) 20 MG tablet TAKE ONE TABLET BY MOUTH TWICE A DAY 60 tablet 5     metFORMIN (GLUCOPHAGE) 500 MG tablet TAKE TWO TABLETS BY MOUTH TWO TIMES A DAY (Patient taking differently: TAKE TWO TABLETS BY MOUTH ONE TIMES A DAY) 360 tablet 0     metoprolol tartrate (LOPRESSOR) 50 MG tablet TAKE ONE TABLET BY MOUTH TWICE A  tablet 0     NOVOLOG FLEXPEN 100 UNIT/ML soln TAKE 15 UNITS BEFORE BREAKFAST, 18 UNITS BEFORE LUNCH AND 18 UNITS BEFORE DINNER PLUS USE OF MED DOSE SLIDING SCALE AS DIRECTED 30 mL 3     ONETOUCH ULTRA test strip USE TO TEST FOUR TIMES A  each 5     potassium chloride SA (K-DUR/KLOR-CON M) 10 MEQ CR tablet Take 1 tablet (10 mEq) by mouth daily 90 tablet 0     ketoconazole (NIZORAL) 2 % cream APPLY TO AFFECTED AREA(S) TOPICALLY AS DIRECTED 15 g 0     meclizine (ANTIVERT) 25 MG tablet Take 1 tablet (25 mg) by mouth every 6 hours as needed for dizziness (Patient not taking: Reported on 1/11/2019) 30 tablet 1     mupirocin (BACTROBAN) 2 % ointment Apply 1 Application topically       nitroGLYcerin (NITROSTAT) 0.4 MG sublingual tablet Place 1 tablet (0.4 mg) under the tongue See Admin Instructions for chest pain (Patient not taking: Reported on 1/11/2019) 25 tablet 3     Allergies   Allergen Reactions     No Known Drug Allergies      Recent Labs   Lab Test 10/12/18  1616 05/15/18  1037 05/09/18 05/02/18  0145 01/30/18  1451 04/11/17  0909 04/26/16  0903  11/28/14  0715  03/07/14  1127  06/12/12  1049   A1C 6.6* 6.3*  --   --  6.1* 6.9* 7.1*   < > 7.8*   < > 7.5*   < > 6.9*   LDL  --   --   --   --   --  43 46  --  46  --  78   < > 65   HDL  --   --   --   --   --  38* 33*  --  34*  --   --   --   --    TRIG  --   --   --   --   --  158* 174*  --  173*  --   --   --   --    ALT  --   --   --  24  --   --  "  --   --   --   --  45  --  19   CR  --  1.38* 1.11 1.24  --  1.14 1.02   < > 0.95   < > 0.89   < > 0.98   GFRESTIMATED  --  50* >60 56*  --  62 71   < > 77   < > 83   < > 75   GFRESTBLACK  --  60* >60 68  --  75 86   < > >90   GFR Calc     < > >90   < > >90   POTASSIUM  --  4.9 3.8 4.1  --  4.3 4.5   < > 4.3   < > 4.5   < > 4.5   TSH 1.75  --   --   --   --   --  1.75  --   --   --  1.37   < >  --     < > = values in this interval not displayed.        Reviewed and updated as needed this visit by clinical staff  Tobacco  Allergies  Meds  Med Hx  Surg Hx  Fam Hx  Soc Hx           ROS:  CONSTITUTIONAL: NEGATIVE for fever or chills.  INTEGUMENTARY/SKIN: POSITIVE for left foot pain, redness, and swelling. POSITIVE for healed 3mm ulceration of the L 1st toe. POSITIVE for active 2mm ulceration if the L 5th toe.   EYES: NEGATIVE for vision changes or irritation  RESP: NEGATIVE for significant cough or SOB  CV: NEGATIVE for chest pain or palpitations  NEURO: POSITIVE for paresthesia of the L lower leg related to the removal of vein for recent CABG surgery. NEGATIVE for numbness, tingling, or paresthesias in the feet.  PSYCHIATRIC: NEGATIVE for changes in mood or affect    OBJECTIVE:     /60 (BP Location: Left arm, Patient Position: Sitting, Cuff Size: Adult Regular)   Pulse 64   Temp 97.4  F (36.3  C) (Temporal)   Resp 18   Ht 1.753 m (5' 9\")   Wt 98.4 kg (217 lb)   SpO2 98%   BMI 32.05 kg/m    Body mass index is 32.05 kg/m .     GENERAL: healthy, alert and no distress  EYES: Eyes grossly normal to inspection, PERRL and conjunctivae and sclerae normal  RESP: lungs clear to auscultation - no rales, rhonchi or wheezes  CV: regular rate and rhythm, normal S1 S2, no S3 or S4, no murmur, click or rub, no peripheral edema and peripheral pulses strong  MS: no gross musculoskeletal defects noted  SKIN: Healed 3mm ulceration of the L 1st toe. Active 2mm ulceration if the L 5th toe. Significant " erythema and swelling of left foot. Skin on left foot is shiny and warm to the touch. Area of infection does not extend past the left foot.   NEURO: Normal strength and tone, mentation intact and speech normal  PSYCH: mentation appears normal, affect normal/bright    ASSESSMENT/PLAN:     Patient was prescribed Keflex to treat the cellulitis of his left foot. Patient was advised to keep his feet elevated whenever possible and allow them to be exposed to air. Patient was advised to use cotton swabs between his digits to reduce pressure on the 5th digit. Return in 10 days to reassess.       ICD-10-CM    1. Cellulitis of left lower extremity L03.116 cephALEXin (KEFLEX) 500 MG capsule   2. Type 2 diabetes mellitus with stage 2 chronic kidney disease, with long-term current use of insulin (H) E11.22     N18.2     Z79.4    3. Hypertension goal BP (blood pressure) < 140/90 I10    4. Skin ulcer of toe of left foot with fat layer exposed (H) L97.522      Will start antibiotics, patient will follow closely both intensity and geographic size of the cellulitis.  Elevation of the leg, Tylenol for discomfort, patient will follow-up in 10 days.      Patient has a history of colon cancer with resection.  Last endoscopy was 2011.  Once the cellulitis has healed up, would strongly recommend repeat endoscopy.    Porfirio Burleson,   [facility]

## 2019-01-14 DIAGNOSIS — N18.1 CHRONIC KIDNEY DISEASE, STAGE I: ICD-10-CM

## 2019-01-14 DIAGNOSIS — I10 HYPERTENSION GOAL BP (BLOOD PRESSURE) < 140/90: ICD-10-CM

## 2019-01-16 RX ORDER — METOPROLOL TARTRATE 50 MG
TABLET ORAL
Qty: 180 TABLET | Refills: 0 | Status: SHIPPED | OUTPATIENT
Start: 2019-01-16 | End: 2019-04-23

## 2019-01-16 RX ORDER — LISINOPRIL 20 MG/1
TABLET ORAL
Qty: 60 TABLET | Refills: 5 | Status: SHIPPED | OUTPATIENT
Start: 2019-01-16 | End: 2019-07-11

## 2019-01-16 NOTE — TELEPHONE ENCOUNTER
"Routing refill request to provider for review/approval because:  Labs out of range:  Cr    metoprolol  Last Written Prescription Date:  10/18/2018  Last Fill Quantity: 180,  # refills: 0   Last office visit: 1/11/2019 with prescribing provider:  1/11/2019   lisinopril  Last Written Prescription Date:  7/17/2018  Last Fill Quantity: 60,  # refills: 5   Last office visit: 1/11/2019 with prescribing provider:  1/11/2019   Future Office Visit:   Next 5 appointments (look out 90 days)    Jan 22, 2019  8:20 AM CST  Office Visit with Porfirio Burleson DO  [facility] [address] [facility] [address] [phone]           Requested Prescriptions   Pending Prescriptions Disp Refills     lisinopril (PRINIVIL/ZESTRIL) 20 MG tablet [Pharmacy Med Name: LISINOPRIL 20MG TABS] 60 tablet 5     Sig: TAKE ONE TABLET BY MOUTH TWICE A DAY    ACE Inhibitors (Including Combos) Protocol Failed - 1/16/2019  9:01 AM       Failed - Blood pressure under 140/90 in past 12 months    BP Readings from Last 3 Encounters:   01/11/19 140/60   10/12/18 116/58   05/15/18 108/58                Failed - Normal serum creatinine on file in past 12 months    Recent Labs   Lab Test 05/15/18  1037   CR 1.38*            Passed - Recent (12 mo) or future (30 days) visit within the authorizing provider's specialty    Patient had office visit in the last 12 months or has a visit in the next 30 days with authorizing provider or within the authorizing provider's specialty.  See \"Patient Info\" tab in inbasket, or \"Choose Columns\" in Meds & Orders section of the refill encounter.             Passed - Medication is active on med list       Passed - Patient is age 18 or older       Passed - Normal serum potassium on file in past 12 months    Recent Labs   Lab Test 05/15/18  1037   POTASSIUM 4.9             metoprolol tartrate (LOPRESSOR) 50 MG tablet [Pharmacy Med Name: METOPROLOL TARTRATE 50MG TABS] 180 tablet 0 " "    Sig: TAKE ONE TABLET BY MOUTH TWICE A DAY    Beta-Blockers Protocol Failed - 1/16/2019  9:01 AM       Failed - Blood pressure under 140/90 in past 12 months    BP Readings from Last 3 Encounters:   01/11/19 140/60   10/12/18 116/58   05/15/18 108/58                Passed - Patient is age 6 or older       Passed - Recent (12 mo) or future (30 days) visit within the authorizing provider's specialty    Patient had office visit in the last 12 months or has a visit in the next 30 days with authorizing provider or within the authorizing provider's specialty.  See \"Patient Info\" tab in inbasket, or \"Choose Columns\" in Meds & Orders section of the refill encounter.             Passed - Medication is active on med list        Chanda Swanson RN on 1/16/2019 at 2:11 PM    "

## 2019-01-22 ENCOUNTER — OFFICE VISIT (OUTPATIENT)
Dept: FAMILY MEDICINE | Facility: OTHER | Age: 79
End: 2019-01-22
Payer: COMMERCIAL

## 2019-01-22 VITALS
HEART RATE: 68 BPM | DIASTOLIC BLOOD PRESSURE: 58 MMHG | SYSTOLIC BLOOD PRESSURE: 126 MMHG | WEIGHT: 220.32 LBS | TEMPERATURE: 97.8 F | RESPIRATION RATE: 18 BRPM | OXYGEN SATURATION: 94 % | HEIGHT: 69 IN | BODY MASS INDEX: 32.63 KG/M2

## 2019-01-22 DIAGNOSIS — N18.2 TYPE 2 DIABETES MELLITUS WITH STAGE 2 CHRONIC KIDNEY DISEASE, WITH LONG-TERM CURRENT USE OF INSULIN (H): ICD-10-CM

## 2019-01-22 DIAGNOSIS — I10 HYPERTENSION GOAL BP (BLOOD PRESSURE) < 140/90: Primary | ICD-10-CM

## 2019-01-22 DIAGNOSIS — E11.22 TYPE 2 DIABETES MELLITUS WITH STAGE 2 CHRONIC KIDNEY DISEASE, WITH LONG-TERM CURRENT USE OF INSULIN (H): ICD-10-CM

## 2019-01-22 DIAGNOSIS — L03.116 CELLULITIS OF LEFT LOWER EXTREMITY: ICD-10-CM

## 2019-01-22 DIAGNOSIS — Z79.4 TYPE 2 DIABETES MELLITUS WITH STAGE 2 CHRONIC KIDNEY DISEASE, WITH LONG-TERM CURRENT USE OF INSULIN (H): ICD-10-CM

## 2019-01-22 LAB
ANION GAP SERPL CALCULATED.3IONS-SCNC: 6 MMOL/L (ref 3–14)
BUN SERPL-MCNC: 20 MG/DL (ref 7–30)
CALCIUM SERPL-MCNC: 8.5 MG/DL (ref 8.5–10.1)
CHLORIDE SERPL-SCNC: 105 MMOL/L (ref 94–109)
CHOLEST SERPL-MCNC: 100 MG/DL
CO2 SERPL-SCNC: 28 MMOL/L (ref 20–32)
CREAT SERPL-MCNC: 1.25 MG/DL (ref 0.66–1.25)
GFR SERPL CREATININE-BSD FRML MDRD: 54 ML/MIN/{1.73_M2}
GLUCOSE SERPL-MCNC: 73 MG/DL (ref 70–99)
HBA1C MFR BLD: 6.3 % (ref 0–5.6)
HDLC SERPL-MCNC: 28 MG/DL
LDLC SERPL CALC-MCNC: 50 MG/DL
NONHDLC SERPL-MCNC: 72 MG/DL
POTASSIUM SERPL-SCNC: 4.5 MMOL/L (ref 3.4–5.3)
SODIUM SERPL-SCNC: 139 MMOL/L (ref 133–144)
TRIGL SERPL-MCNC: 111 MG/DL

## 2019-01-22 PROCEDURE — 80048 BASIC METABOLIC PNL TOTAL CA: CPT | Performed by: INTERNAL MEDICINE

## 2019-01-22 PROCEDURE — 99214 OFFICE O/P EST MOD 30 MIN: CPT | Performed by: INTERNAL MEDICINE

## 2019-01-22 PROCEDURE — 80061 LIPID PANEL: CPT | Performed by: INTERNAL MEDICINE

## 2019-01-22 PROCEDURE — 83036 HEMOGLOBIN GLYCOSYLATED A1C: CPT | Performed by: INTERNAL MEDICINE

## 2019-01-22 PROCEDURE — 36415 COLL VENOUS BLD VENIPUNCTURE: CPT | Performed by: INTERNAL MEDICINE

## 2019-01-22 RX ORDER — CEPHALEXIN 500 MG/1
500 CAPSULE ORAL 3 TIMES DAILY
Qty: 21 CAPSULE | Refills: 0 | Status: SHIPPED | OUTPATIENT
Start: 2019-01-22 | End: 2019-10-18

## 2019-01-22 ASSESSMENT — MIFFLIN-ST. JEOR: SCORE: 1699.74

## 2019-01-22 ASSESSMENT — PAIN SCALES - GENERAL: PAINLEVEL: NO PAIN (0)

## 2019-01-22 NOTE — PROGRESS NOTES
SUBJECTIVE:   Pj Zhao is a 78 year old male who presents to clinic today for the following health issues:  Chief Complaint   Patient presents with     RECHECK     left foot      HPI:   Patient presents to clinic for a recheck of left 5th toe ulcer with cellulitis of left foot. Patient just finished a 10 day course of Keflex for this infection. Patient states that he has been putting cotton swabs between his toes daily. Patient states that his foot is still feeling much better, but that he still has some pain the left 5th toe. Patient states that his recent blood sugars have been in the <200 range.    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, right     Chronic ischemic heart disease     Acute right-sided weakness     Slurring of speech     Obesity, Class I, BMI 30-34.9     CKD (chronic kidney disease) stage 2, GFR 60-89 ml/min     Type 2 diabetes mellitus with stage 2 chronic kidney disease, with long-term current use of insulin (H)     Malignant neoplasm of colon, unspecified part of colon (H), hx of     Vitamin B12 deficiency (non anemic)     Past Surgical History:   Procedure Laterality Date     C APPENDECTOMY  3/11/2003     C NONSPECIFIC PROCEDURE      Laser surgery for destruction of kidney stones     C NONSPECIFIC PROCEDURE  3/11/2003    Lower anterior sigmoid resection with end-to-end anastomosis.       CARDIAC SURGERY  05/2018     COLONOSCOPY  03/06/07    repeat in 3-5 yrs.      COLONOSCOPY  6/28/2011    Procedure:COMBINED COLONOSCOPY, REMOVE TUMOR/POLYP/LESION BY SNARE; Surgeon:JOANN PATEL; Location:[facility]     HC REMOVAL OF TONSILS,<11 Y/O      Tonsils <10y.o.     PHACOEMULSIFICATION WITH STANDARD INTRAOCULAR LENS IMPLANT Right 1/15/2015    Procedure: PHACOEMULSIFICATION WITH STANDARD INTRAOCULAR LENS IMPLANT;  Surgeon: Jamarcus Ferrer MD;  [facility]     PHACOEMULSIFICATION WITH STANDARD INTRAOCULAR LENS IMPLANT Left 2/19/2015    Procedure: PHACOEMULSIFICATION WITH STANDARD INTRAOCULAR LENS IMPLANT;  Surgeon: Jamarcus Ferrer MD;  Location: [facility]       Social History     Tobacco Use     Smoking status: Never Smoker     Smokeless tobacco: Never Used   Substance Use Topics     Alcohol use: No     Alcohol/week: 0.0 oz     Family History   Problem Relation Age of Onset     Diabetes Mother      Obesity Mother      Cancer Sister         Mouth         Current Outpatient Medications   Medication Sig Dispense Refill     cephALEXin (KEFLEX) 500 MG capsule Take 1 capsule (500 mg) by mouth 3 times daily 21 capsule 0     acetaminophen (TYLENOL) 325 MG tablet Take 650 mg by mouth       amLODIPine (NORVASC) 5 MG tablet TAKE ONE TABLET BY MOUTH ONCE DAILY 90 tablet 3     aspirin (ASA) 81 MG tablet Take 81 mg by mouth daily       atorvastatin (LIPITOR) 40 MG tablet TAKE ONE TABLET BY MOUTH ONCE DAILY 30 tablet 3     B-D U/F 31G X 8 MM insulin pen needle AS DIRECTED 100 each 8     furosemide (LASIX) 20 MG tablet TAKE ONE TABLET BY MOUTH ONCE DAILY 30 tablet 3     gabapentin (NEURONTIN) 300 MG capsule TAKE TWO CAPSULES BY MOUTH AT BEDTIME 180 capsule 0     ketoconazole (NIZORAL) 2 % cream APPLY TO AFFECTED AREA(S) TOPICALLY AS DIRECTED 15 g 0     LANTUS SOLOSTAR 100 UNIT/ML soln INJECT 50 UNITS SUBCUTANEOUSLY ONCE DAILY (Patient taking differently: INJECT 35 UNITS SUBCUTANEOUSLY ONCE DAILY) 15 mL 3     lisinopril (PRINIVIL/ZESTRIL) 20 MG tablet TAKE ONE TABLET BY MOUTH TWICE A DAY  60 tablet 5     meclizine (ANTIVERT) 25 MG tablet Take 1 tablet (25 mg) by mouth every 6 hours as needed for dizziness (Patient not taking: Reported on 1/11/2019) 30 tablet 1     metFORMIN (GLUCOPHAGE) 500 MG tablet TAKE TWO TABLETS BY MOUTH TWO TIMES A DAY (Patient taking differently: TAKE TWO TABLETS BY MOUTH ONE TIMES A DAY) 360 tablet 0     metoprolol tartrate (LOPRESSOR) 50 MG tablet TAKE ONE TABLET BY MOUTH TWICE A  tablet 0     mupirocin (BACTROBAN) 2 % ointment Apply 1 Application topically       nitroGLYcerin (NITROSTAT) 0.4 MG sublingual tablet Place 1 tablet (0.4 mg) under the tongue See Admin Instructions for chest pain (Patient not taking: Reported on 1/11/2019) 25 tablet 3     NOVOLOG FLEXPEN 100 UNIT/ML soln TAKE 15 UNITS BEFORE BREAKFAST, 18 UNITS BEFORE LUNCH AND 18 UNITS BEFORE DINNER PLUS USE OF MED DOSE SLIDING SCALE AS DIRECTED 30 mL 3     ONETOUCH ULTRA test strip USE TO TEST FOUR TIMES A  each 5     potassium chloride SA (K-DUR/KLOR-CON M) 10 MEQ CR tablet Take 1 tablet (10 mEq) by mouth daily 90 tablet 0     Allergies   Allergen Reactions     No Known Drug Allergies      Recent Labs   Lab Test 10/12/18  1616 05/15/18  1037 05/09/18 05/02/18  0145 01/30/18  1451 04/11/17  0909 04/26/16  0903  11/28/14  0715  03/07/14  1127  06/12/12  1049   A1C 6.6* 6.3*  --   --  6.1* 6.9* 7.1*   < > 7.8*   < > 7.5*   < > 6.9*   LDL  --   --   --   --   --  43 46  --  46  --  78   < > 65   HDL  --   --   --   --   --  38* 33*  --  34*  --   --   --   --    TRIG  --   --   --   --   --  158* 174*  --  173*  --   --   --   --    ALT  --   --   --  24  --   --   --   --   --   --  45  --  19   CR  --  1.38* 1.11 1.24  --  1.14 1.02   < > 0.95   < > 0.89   < > 0.98   GFRESTIMATED  --  50* >60 56*  --  62 71   < > 77   < > 83   < > 75   GFRESTBLACK  --  60* >60 68  --  75 86   < > >90   GFR Calc     < > >90   < > >90   POTASSIUM  --  4.9 3.8 4.1  --  4.3 4.5   < > 4.3   < > 4.5   < >  "4.5   TSH 1.75  --   --   --   --   --  1.75  --   --   --  1.37   < >  --     < > = values in this interval not displayed.      BP Readings from Last 3 Encounters:   01/22/19 126/58   01/11/19 140/60   10/12/18 116/58    Wt Readings from Last 3 Encounters:   01/22/19 99.9 kg (220 lb 5.1 oz)   01/11/19 98.4 kg (217 lb)   10/12/18 100.4 kg (221 lb 6.4 oz)           Reviewed and updated as needed this visit by clinical staff  Tobacco  Allergies  Meds  Med Hx  Surg Hx  Fam Hx  Soc Hx      Reviewed and updated as needed this visit by Provider       ROS:  CONSTITUTIONAL: NEGATIVE for fever, chills, change in weight  INTEGUMENTARY/SKIN: Erythema, pain, and swelling of left foot largely resolved. Patient still complains of mild pain and erythema of left 5th toe. Ulceration of left 5th toe is healing with approximately 2 mm scab covering the wound.   EYES: NEGATIVE for vision changes or irritation  RESP: NEGATIVE for significant cough or SOB  CV: NEGATIVE for chest pain, palpitations or peripheral edema  NEURO: NEGATIVE for weakness, dizziness, numbness, tingling, or paresthesias.  ENDOCRINE: NEGATIVE for temperature intolerance, skin/hair changes  PSYCHIATRIC: NEGATIVE for changes in mood or affect    OBJECTIVE:     /58 (BP Location: Left arm, Patient Position: Sitting, Cuff Size: Adult Large)   Pulse 68   Temp 97.8  F (36.6  C) (Temporal)   Resp 18   Ht 1.753 m (5' 9\")   Wt 99.9 kg (220 lb 5.1 oz)   SpO2 94%   BMI 32.54 kg/m    Body mass index is 32.54 kg/m .    GENERAL: healthy, alert and no distress  EYES: Eyes grossly normal to inspection, PERRL and conjunctivae and sclerae normal  RESP: lungs clear to auscultation - no rales, rhonchi or wheezes  CV: regular rate and rhythm, normal S1 S2, no S3 or S4, no murmur, click or rub, no peripheral edema and peripheral pulses strong  SKIN: Erythema, pain, and swelling of left foot largely resolved. Ulceration of left 5th toe is healing with approximately 2 mm " scab covering the wound.  Left 5th toe is still moderately erythematous and tender.  NEURO: Normal strength and tone, mentation intact and speech normal.  PSYCH: mentation appears normal, affect normal/bright    ASSESSMENT/PLAN:     Will extend course of antibiotics due to erythema and pain in left 5th toe and patient's high risk for infection due to his type II diabetes. Encouraged patient to continue placing cotton swabs between his toes. Will check patient's A1C and lipids today as he his due for a re-check.      ICD-10-CM    1. Hypertension goal BP (blood pressure) < 140/90 I10 Basic metabolic panel   2. Cellulitis of left lower extremity L03.116 cephALEXin (KEFLEX) 500 MG capsule   3. Type 2 diabetes mellitus with stage 2 chronic kidney disease, with long-term current use of insulin (H) E11.22 Hemoglobin A1c    N18.2 Lipid Profile    Z79.4                               FOLLOW UP   I have asked the patient to make an appointment for followup with me in 2 weeks        I have carefully explained the diagnosis and treatment options to the patient.  The patient has displayed an understanding of the above, and all subsequent questions were answered.      This patient has been interviewed, examined, diagnosed, and informed of the above by me personally.  Medical records and available pertinent information has been reviewed by me personally.  All decisions and discussion have been between myself and the patient/family.  This was done in the presence of Merlene Jenkins  , who acted as a medical scribe and recorded the events above.  No diagnosis or decision making was made by the above-mentioned scribe.  The patient, and or his/her ensurors will not be billed for the presence or actions of this scribe.  The information recorded by the scribe has been reviewed by me and found to be accurate.    Porfirio Burleson, [facility]

## 2019-01-22 NOTE — LETTER
January 29, 2019      Pj Zhao  [address]   [place] [address]        Dear Pj, your recent test results are attached.   The cholesterol is well controlled with an LDL of 50.   The chemistry panel shows normal blood sugar and renal function.   The A1c is well controlled at 6.3 suggesting very good blood sugar control.     Feel free to contact me via the office or My Chart if you have any questions regarding the above.    Resulted Orders   Hemoglobin A1c   Result Value Ref Range    Hemoglobin A1C 6.3 (H) 0 - 5.6 %      Comment:      Normal <5.7% Prediabetes 5.7-6.4%  Diabetes 6.5% or higher - adopted from ADA   consensus guidelines.     Basic metabolic panel   Result Value Ref Range    Sodium 139 133 - 144 mmol/L    Potassium 4.5 3.4 - 5.3 mmol/L    Chloride 105 94 - 109 mmol/L    Carbon Dioxide 28 20 - 32 mmol/L    Anion Gap 6 3 - 14 mmol/L    Glucose 73 70 - 99 mg/dL      Comment:      Fasting specimen    Urea Nitrogen 20 7 - 30 mg/dL    Creatinine 1.25 0.66 - 1.25 mg/dL    GFR Estimate 54 (L) >60 mL/min/[1.73_m2]      Comment:      Non  GFR Calc  Starting 12/18/2018, serum creatinine based estimated GFR (eGFR) will be   calculated using the Chronic Kidney Disease Epidemiology Collaboration   (CKD-EPI) equation.      GFR Estimate If Black 63 >60 mL/min/[1.73_m2]      Comment:       GFR Calc  Starting 12/18/2018, serum creatinine based estimated GFR (eGFR) will be   calculated using the Chronic Kidney Disease Epidemiology Collaboration   (CKD-EPI) equation.      Calcium 8.5 8.5 - 10.1 mg/dL   Lipid Profile   Result Value Ref Range    Cholesterol 100 <200 mg/dL    Triglycerides 111 <150 mg/dL      Comment:      Fasting specimen    HDL Cholesterol 28 (L) >39 mg/dL    LDL Cholesterol Calculated 50 <100 mg/dL      Comment:      Desirable:       <100 mg/dl    Non HDL Cholesterol 72 <130 mg/dL       If you have any questions or concerns, please call the clinic at the  number listed above.       Sincerely,        Porfirio Burleson, DO

## 2019-01-28 DIAGNOSIS — R60.1 GENERALIZED EDEMA: ICD-10-CM

## 2019-01-28 RX ORDER — POTASSIUM CHLORIDE 750 MG/1
TABLET, EXTENDED RELEASE ORAL
Qty: 90 TABLET | Refills: 3 | Status: SHIPPED | OUTPATIENT
Start: 2019-01-28 | End: 2019-10-18

## 2019-01-28 NOTE — TELEPHONE ENCOUNTER
"Potassium Chl.  Last Written Prescription Date:  10/12/2018  Last Fill Quantity: 90,  # refills: 0   Last office visit: 1/22/2019 with prescribing provider:  Benjy   Future Office Visit:  None  Prescription [place] [facility] [place].    Requested Prescriptions   Pending Prescriptions Disp Refills     potassium chloride ER (K-DUR/KLOR-CON M) 10 MEQ CR tablet [Pharmacy Med Name: POTASSIUM CHL ER TAB MICRO 10MEQ] 90 tablet 0     Sig: TAKE ONE TABLET BY MOUTH DAILY    Potassium Supplements Protocol Passed - 1/28/2019  1:29 PM       Passed - Recent (12 mo) or future (30 days) visit within the authorizing provider's specialty    Patient had office visit in the last 12 months or has a visit in the next 30 days with authorizing provider or within the authorizing provider's specialty.  See \"Patient Info\" tab in inbasket, or \"Choose Columns\" in Meds & Orders section of the refill encounter.         Passed - Medication is active on med list       Passed - Patient is age 18 or older       Passed - Normal serum potassium in past 12 months    Recent Labs   Lab Test 01/22/19  0904   POTASSIUM 4.5         Justyna Weaver RN   "

## 2019-01-29 NOTE — RESULT ENCOUNTER NOTE
Dear Pj, your recent test results are attached.  The cholesterol is well controlled with an LDL of 50.  The chemistry panel shows normal blood sugar and renal function.  The A1c is well controlled at 6.3 suggesting very good blood sugar control.    Feel free to contact me via the office or My Chart if you have any questions regarding the above.

## 2019-01-31 NOTE — TELEPHONE ENCOUNTER
Prescription was sent 10/18/18 for #15 mL with 3 refills.  Pharmacy notified via E-Prescribe refusal.   RN called and verified with pharmacy. They did received the RX that was sent on 10/18/18.     Keisha Avendaño RN  [facility]     details… detailed exam

## 2019-02-12 ENCOUNTER — TELEPHONE (OUTPATIENT)
Dept: INTERNAL MEDICINE | Facility: CLINIC | Age: 79
End: 2019-02-12

## 2019-02-12 DIAGNOSIS — E11.22 TYPE 2 DIABETES MELLITUS WITH STAGE 2 CHRONIC KIDNEY DISEASE, WITH LONG-TERM CURRENT USE OF INSULIN (H): Primary | ICD-10-CM

## 2019-02-12 DIAGNOSIS — N18.2 TYPE 2 DIABETES MELLITUS WITH STAGE 2 CHRONIC KIDNEY DISEASE, WITH LONG-TERM CURRENT USE OF INSULIN (H): Primary | ICD-10-CM

## 2019-02-12 DIAGNOSIS — Z79.4 TYPE 2 DIABETES MELLITUS WITH STAGE 2 CHRONIC KIDNEY DISEASE, WITH LONG-TERM CURRENT USE OF INSULIN (H): Primary | ICD-10-CM

## 2019-02-12 RX ORDER — INSULIN GLARGINE 100 [IU]/ML
35 INJECTION, SOLUTION SUBCUTANEOUS DAILY
Qty: 15 ML | Refills: 3 | Status: SHIPPED | OUTPATIENT
Start: 2019-02-12 | End: 2019-03-18

## 2019-02-12 NOTE — TELEPHONE ENCOUNTER
[facility] is requesting a change in pt's Lantus. They said insurance prefers Basaglar or Levemir. Domenica Girard CMA ([facility])

## 2019-02-18 DIAGNOSIS — Z79.4 TYPE 2 DIABETES MELLITUS WITH STAGE 2 CHRONIC KIDNEY DISEASE, WITH LONG-TERM CURRENT USE OF INSULIN (H): ICD-10-CM

## 2019-02-18 DIAGNOSIS — E11.22 TYPE 2 DIABETES MELLITUS WITH STAGE 2 CHRONIC KIDNEY DISEASE, WITH LONG-TERM CURRENT USE OF INSULIN (H): ICD-10-CM

## 2019-02-18 DIAGNOSIS — N18.2 TYPE 2 DIABETES MELLITUS WITH STAGE 2 CHRONIC KIDNEY DISEASE, WITH LONG-TERM CURRENT USE OF INSULIN (H): ICD-10-CM

## 2019-02-18 RX ORDER — INSULIN ASPART 100 [IU]/ML
INJECTION, SOLUTION INTRAVENOUS; SUBCUTANEOUS
Qty: 30 ML | Refills: 3 | Status: SHIPPED | OUTPATIENT
Start: 2019-02-18 | End: 2019-10-05

## 2019-02-18 NOTE — TELEPHONE ENCOUNTER
"Requested Prescriptions   Pending Prescriptions Disp Refills     NOVOLOG FLEXPEN 100 UNIT/ML soln [Pharmacy Med Name: NOVOLOG FLEXPEN 100UNIT/ML SOPN] 30 mL 3    Last Written Prescription Date:  6/20/18  Last Fill Quantity: 30 mL,  # refills: 3   Last office visit: No previous visit found with prescribing provider:  1/22/19   Future Office Visit:     Sig: TAKE 15 UNITS BEFORE BREAKFAST, 18 UNITS BEFORE LUNCH AND 18 UNITS BEFORE DINNER PLUS USE OF MED DOSE SLIDING SCALE AS DIRECTED    Short Acting Insulin Protocol Passed - 2/18/2019 11:55 AM       Passed - Blood pressure less than 140/90 in past 6 months    BP Readings from Last 3 Encounters:   01/22/19 126/58   01/11/19 140/60   10/12/18 116/58                Passed - LDL on file in past 12 months    Recent Labs   Lab Test 01/22/19  0904   LDL 50            Passed - Microalbumin on file in past 12 months    Recent Labs   Lab Test 10/12/18  1630   MICROL 34   UMALCR 27.60*            Passed - Serum creatinine on file in past 12 months    Recent Labs   Lab Test 01/22/19  0904   CR 1.25            Passed - HgbA1C in past 3 or 6 months    If HgbA1C is 8 or greater, it needs to be on file within the past 3 months.  If less than 8, must be on file within the past 6 months.     Recent Labs   Lab Test 01/22/19  0904   A1C 6.3*            Passed - Medication is active on med list       Passed - Patient is age 18 or older       Passed - Recent (6 mo) or future (30 days) visit within the authorizing provider's specialty    Patient had office visit in the last 6 months or has a visit in the next 30 days with authorizing provider or within the authorizing provider's specialty.  See \"Patient Info\" tab in inbasket, or \"Choose Columns\" in Meds & Orders section of the refill encounter.            Rx refilled per RN protocol.  THOMAS Andre, RN    "

## 2019-02-26 DIAGNOSIS — E11.22 TYPE 2 DIABETES MELLITUS WITH STAGE 2 CHRONIC KIDNEY DISEASE, WITHOUT LONG-TERM CURRENT USE OF INSULIN (H): ICD-10-CM

## 2019-02-26 DIAGNOSIS — N18.2 TYPE 2 DIABETES MELLITUS WITH STAGE 2 CHRONIC KIDNEY DISEASE, WITHOUT LONG-TERM CURRENT USE OF INSULIN (H): ICD-10-CM

## 2019-02-27 NOTE — TELEPHONE ENCOUNTER
"Requested Prescriptions   Pending Prescriptions Disp Refills     metFORMIN (GLUCOPHAGE) 500 MG tablet [Pharmacy Med Name: METFORMIN HCL 500MG TABS] 360 tablet 0    Last Written Prescription Date:  10/2/18  Last Fill Quantity: 360,  # refills: 0   Last office visit: 1/22/2019 with prescribing provider:  1/22/19   Future Office Visit:     Sig: TAKE TWO TABLETS BY MOUTH TWO TIMES DAILY    Biguanide Agents Passed - 2/26/2019  9:07 AM       Passed - Blood pressure less than 140/90 in past 6 months    BP Readings from Last 3 Encounters:   01/22/19 126/58   01/11/19 140/60   10/12/18 116/58                Passed - Patient has documented LDL within the past 12 mos.    Recent Labs   Lab Test 01/22/19  0904   LDL 50            Passed - Patient has had a Microalbumin in the past 15 mos.    Recent Labs   Lab Test 10/12/18  1630   MICROL 34   UMALCR 27.60*            Passed - Patient is age 10 or older       Passed - Patient has documented A1c within the specified period of time.    If HgbA1C is 8 or greater, it needs to be on file within the past 3 months.  If less than 8, must be on file within the past 6 months.     Recent Labs   Lab Test 01/22/19  0904   A1C 6.3*            Passed - Patient's CR is NOT>1.4 OR Patient's EGFR is NOT<45 within past 12 mos.    Recent Labs   Lab Test 01/22/19  0904   GFRESTIMATED 54*   GFRESTBLACK 63       Recent Labs   Lab Test 01/22/19  0904   CR 1.25            Passed - Patient does NOT have a diagnosis of CHF.       Passed - Medication is active on med list       Passed - Recent (6 mo) or future (30 days) visit within the authorizing provider's specialty    Patient had office visit in the last 6 months or has a visit in the next 30 days with authorizing provider or within the authorizing provider's specialty.  See \"Patient Info\" tab in inbasket, or \"Choose Columns\" in Meds & Orders section of the refill encounter.            "

## 2019-02-27 NOTE — TELEPHONE ENCOUNTER
Routing refill request to provider for review/approval because:  Labs out of range:  Microalbumin, GFR    THOMAS Espinal, RN  [facility]

## 2019-03-18 DIAGNOSIS — N18.2 TYPE 2 DIABETES MELLITUS WITH STAGE 2 CHRONIC KIDNEY DISEASE, WITH LONG-TERM CURRENT USE OF INSULIN (H): ICD-10-CM

## 2019-03-18 DIAGNOSIS — Z79.4 TYPE 2 DIABETES MELLITUS WITH STAGE 2 CHRONIC KIDNEY DISEASE, WITH LONG-TERM CURRENT USE OF INSULIN (H): ICD-10-CM

## 2019-03-18 DIAGNOSIS — E11.22 TYPE 2 DIABETES MELLITUS WITH STAGE 2 CHRONIC KIDNEY DISEASE, WITH LONG-TERM CURRENT USE OF INSULIN (H): ICD-10-CM

## 2019-03-18 RX ORDER — INSULIN GLARGINE 100 [IU]/ML
35 INJECTION, SOLUTION SUBCUTANEOUS DAILY
Qty: 12 ML | Refills: 0 | Status: SHIPPED | OUTPATIENT
Start: 2019-03-18 | End: 2019-03-26

## 2019-03-26 DIAGNOSIS — N18.2 TYPE 2 DIABETES MELLITUS WITH STAGE 2 CHRONIC KIDNEY DISEASE, WITH LONG-TERM CURRENT USE OF INSULIN (H): ICD-10-CM

## 2019-03-26 DIAGNOSIS — E11.22 TYPE 2 DIABETES MELLITUS WITH STAGE 2 CHRONIC KIDNEY DISEASE, WITH LONG-TERM CURRENT USE OF INSULIN (H): ICD-10-CM

## 2019-03-26 DIAGNOSIS — Z79.4 TYPE 2 DIABETES MELLITUS WITH STAGE 2 CHRONIC KIDNEY DISEASE, WITH LONG-TERM CURRENT USE OF INSULIN (H): ICD-10-CM

## 2019-03-26 RX ORDER — INSULIN GLARGINE 100 [IU]/ML
35 INJECTION, SOLUTION SUBCUTANEOUS DAILY
Qty: 30 ML | Refills: 0 | Status: SHIPPED | OUTPATIENT
Start: 2019-03-26 | End: 2019-12-18

## 2019-04-01 DIAGNOSIS — R60.1 GENERALIZED EDEMA: ICD-10-CM

## 2019-04-01 DIAGNOSIS — E11.8 TYPE 2 DIABETES MELLITUS WITH COMPLICATION (H): ICD-10-CM

## 2019-04-01 DIAGNOSIS — E11.65 TYPE 2 DIABETES MELLITUS WITH HYPERGLYCEMIA (H): ICD-10-CM

## 2019-04-02 RX ORDER — FUROSEMIDE 20 MG
TABLET ORAL
Qty: 30 TABLET | Refills: 3 | Status: SHIPPED | OUTPATIENT
Start: 2019-04-02 | End: 2019-08-01

## 2019-04-02 RX ORDER — PEN NEEDLE, DIABETIC 31 GX5/16"
NEEDLE, DISPOSABLE MISCELLANEOUS
Qty: 100 EACH | Refills: 5 | Status: SHIPPED | OUTPATIENT
Start: 2019-04-02 | End: 2020-07-21

## 2019-04-02 NOTE — TELEPHONE ENCOUNTER
Prescription approved per [facility] [place] Protocol.    CHICO EspinalN, RN  [facility]

## 2019-04-02 NOTE — TELEPHONE ENCOUNTER
"Requested Prescriptions   Pending Prescriptions Disp Refills     B-D U/F 31G X 8 MM insulin pen needle [Pharmacy Med Name: BD PEN NEEDLE SHORT 31G X 8 MM MISC] 100 each 8    Last Written Prescription Date:  1/12/18  Last Fill Quantity: 100,  # refills: 8   Last office visit: No previous visit found with prescribing provider:  1/22/19   Future Office Visit:     Sig: AS DIRECTED    Diabetic Supplies Protocol Passed - 4/1/2019  9:00 AM       Passed - Medication is active on med list       Passed - Patient is 18 years of age or older       Passed - Recent (6 mo) or future (30 days) visit within the authorizing provider's specialty    Patient had office visit in the last 6 months or has a visit in the next 30 days with authorizing provider.  See \"Patient Info\" tab in inbasket, or \"Choose Columns\" in Meds & Orders section of the refill encounter.            ONETOUCH ULTRA test strip [Pharmacy Med Name: ONETOUCH ULTRA BLUE  STRP] 100 each 5    Last Written Prescription Date:  8/16/18  Last Fill Quantity: 100,  # refills: 5   Last office visit: No previous visit found with prescribing provider:  1/22/19   Future Office Visit:     Sig: USE TO TEST FOUR TIMES A DAY    Diabetic Supplies Protocol Passed - 4/1/2019  9:00 AM       Passed - Medication is active on med list       Passed - Patient is 18 years of age or older       Passed - Recent (6 mo) or future (30 days) visit within the authorizing provider's specialty    Patient had office visit in the last 6 months or has a visit in the next 30 days with authorizing provider.  See \"Patient Info\" tab in inbasket, or \"Choose Columns\" in Meds & Orders section of the refill encounter.            furosemide (LASIX) 20 MG tablet [Pharmacy Med Name: FUROSEMIDE 20MG TABS] 30 tablet 3    Last Written Prescription Date:  11/29/18  Last Fill Quantity: 30,  # refills: 3   Last office visit: No previous visit found with prescribing provider:  1/22/19   Future Office Visit:     Sig: TAKE ONE " "TABLET BY MOUTH ONCE DAILY    Diuretics (Including Combos) Protocol Passed - 4/1/2019  9:00 AM       Passed - Blood pressure under 140/90 in past 12 months    BP Readings from Last 3 Encounters:   01/22/19 126/58   01/11/19 140/60   10/12/18 116/58                Passed - Recent (12 mo) or future (30 days) visit within the authorizing provider's specialty    Patient had office visit in the last 12 months or has a visit in the next 30 days with authorizing provider or within the authorizing provider's specialty.  See \"Patient Info\" tab in inbasket, or \"Choose Columns\" in Meds & Orders section of the refill encounter.             Passed - Medication is active on med list       Passed - Patient is age 18 or older       Passed - Normal serum creatinine on file in past 12 months    Recent Labs   Lab Test 01/22/19  0904   CR 1.25             Passed - Normal serum potassium on file in past 12 months    Recent Labs   Lab Test 01/22/19  0904   POTASSIUM 4.5                   Passed - Normal serum sodium on file in past 12 months    Recent Labs   Lab Test 01/22/19  0904                 "

## 2019-04-23 DIAGNOSIS — I10 HYPERTENSION GOAL BP (BLOOD PRESSURE) < 140/90: ICD-10-CM

## 2019-04-24 RX ORDER — METOPROLOL TARTRATE 50 MG
TABLET ORAL
Qty: 180 TABLET | Refills: 0 | Status: SHIPPED | OUTPATIENT
Start: 2019-04-24 | End: 2019-07-23

## 2019-04-24 NOTE — TELEPHONE ENCOUNTER
Prescription [place] per [facility] [place].    CHICO EspinalN, RN  [facility]

## 2019-04-24 NOTE — TELEPHONE ENCOUNTER
"Requested Prescriptions   Pending Prescriptions Disp Refills     metoprolol tartrate (LOPRESSOR) 50 MG tablet [Pharmacy Med Name: METOPROLOL TARTRATE 50MG TABS] 180 tablet 0     Sig: TAKE ONE TABLET BY MOUTH TWICE A DAY   Last Written Prescription Date:  1/16/19  Last Fill Quantity: 180,  # refills: 0   Last office visit: 1/22/2019 with prescribing provider:  1/22/19   Future Office Visit:        Beta-Blockers Protocol Passed - 4/23/2019  2:41 PM        Passed - Blood pressure under 140/90 in past 12 months     BP Readings from Last 3 Encounters:   01/22/19 126/58   01/11/19 140/60   10/12/18 116/58                 Passed - Patient is age 6 or older        Passed - Recent (12 mo) or future (30 days) visit within the authorizing provider's specialty     Patient had office visit in the last 12 months or has a visit in the next 30 days with authorizing provider or within the authorizing provider's specialty.  See \"Patient Info\" tab in inbasket, or \"Choose Columns\" in Meds & Orders section of the refill encounter.              Passed - Medication is active on med list        "

## 2019-04-30 DIAGNOSIS — E78.5 HYPERLIPIDEMIA LDL GOAL <100: ICD-10-CM

## 2019-05-01 RX ORDER — ATORVASTATIN CALCIUM 40 MG/1
TABLET, FILM COATED ORAL
Qty: 30 TABLET | Refills: 3 | Status: SHIPPED | OUTPATIENT
Start: 2019-05-01 | End: 2019-09-03

## 2019-05-01 NOTE — TELEPHONE ENCOUNTER
"Requested Prescriptions   Pending Prescriptions Disp Refills     atorvastatin (LIPITOR) 40 MG tablet [Pharmacy Med Name: ATORVASTATIN 40MG TABS] 30 tablet 3     Sig: TAKE ONE TABLET BY MOUTH ONCE DAILY   Last Written Prescription Date:  10/25/17  Last Fill Quantity: 90,  # refills: 3   Last office visit: 1/22/2019 with prescribing provider:  1/22/19   Future Office Visit:        Statins Protocol Passed - 4/30/2019  6:13 PM        Passed - LDL on file in past 12 months     Recent Labs   Lab Test 01/22/19  0904   LDL 50             Passed - No abnormal creatine kinase in past 12 months     No lab results found.             Passed - Recent (12 mo) or future (30 days) visit within the authorizing provider's specialty     Patient had office visit in the last 12 months or has a visit in the next 30 days with authorizing provider or within the authorizing provider's specialty.  See \"Patient Info\" tab in inbasket, or \"Choose Columns\" in Meds & Orders section of the refill encounter.              Passed - Medication is active on med list        Passed - Patient is age 18 or older        "

## 2019-05-01 NOTE — TELEPHONE ENCOUNTER
Routing refill request to provider for review/approval because:  A break in medication, patient should have been out of medication over a year ago    THOMAS Espinal, RN  [facility]

## 2019-05-20 ENCOUNTER — TRANSFERRED RECORDS (OUTPATIENT)
Dept: HEALTH INFORMATION MANAGEMENT | Facility: CLINIC | Age: 79
End: 2019-05-20

## 2019-05-28 DIAGNOSIS — N18.2 TYPE 2 DIABETES MELLITUS WITH STAGE 2 CHRONIC KIDNEY DISEASE, WITHOUT LONG-TERM CURRENT USE OF INSULIN (H): ICD-10-CM

## 2019-05-28 DIAGNOSIS — E11.22 TYPE 2 DIABETES MELLITUS WITH STAGE 2 CHRONIC KIDNEY DISEASE, WITHOUT LONG-TERM CURRENT USE OF INSULIN (H): ICD-10-CM

## 2019-05-29 RX ORDER — GABAPENTIN 300 MG/1
CAPSULE ORAL
Qty: 180 CAPSULE | Refills: 0 | Status: SHIPPED | OUTPATIENT
Start: 2019-05-29 | End: 2019-08-23

## 2019-05-29 NOTE — TELEPHONE ENCOUNTER
Requested Prescriptions   Pending Prescriptions Disp Refills     gabapentin (NEURONTIN) 300 MG capsule [Pharmacy Med Name: GABAPENTIN 300MG CAPS] 180 capsule 0     Sig: TAKE TWO CAPSULES BY MOUTH AT BEDTIME       There is no refill protocol information for this order        Last Written Prescription Date:  12/13/2018  Last Fill Quantity: 180,  # refills:  0  Last office visit: 1/22/2019 with prescribing provider:     Future Office Visit:      Routing refill request to provider for review/approval because:  Drug not on the [facility] refill protocol     Haylee Garcia RN

## 2019-07-11 DIAGNOSIS — N18.1 CHRONIC KIDNEY DISEASE, STAGE I: ICD-10-CM

## 2019-07-12 RX ORDER — LISINOPRIL 20 MG/1
TABLET ORAL
Qty: 60 TABLET | Refills: 5 | Status: SHIPPED | OUTPATIENT
Start: 2019-07-12 | End: 2019-10-18

## 2019-07-12 NOTE — TELEPHONE ENCOUNTER
"Lisinopril  Last Written Prescription Date:  01/16/2019  Last Fill Quantity: 60,  # refills: 5   Last office visit: 1/22/2019 with prescribing provider:  Benjy   Future Office Visit:  None  Pres[place] [facility] [place].    Requested Prescriptions   Pending Prescriptions Disp Refills     lisinopril (PRINIVIL/ZESTRIL) 20 MG tablet [Pharmacy Med Name: LISINOPRIL 20MG TABS] 60 tablet 5     Sig: TAKE ONE TABLET BY MOUTH TWICE A DAY       ACE Inhibitors (Including Combos) Protocol Passed - 7/11/2019  7:32 PM        Passed - Blood pressure under 140/90 in past 12 months     BP Readings from Last 3 Encounters:   01/22/19 126/58   01/11/19 140/60   10/12/18 116/58           Passed - Recent (12 mo) or future (30 days) visit within the authorizing provider's specialty     Patient had office visit in the last 12 months or has a visit in the next 30 days with authorizing provider or within the authorizing provider's specialty.  See \"Patient Info\" tab in inbasket, or \"Choose Columns\" in Meds & Orders section of the refill encounter.          Passed - Medication is active on med list        Passed - Patient is age 18 or older        Passed - Normal serum creatinine on file in past 12 months     Recent Labs   Lab Test 01/22/19  0904   CR 1.25           Passed - Normal serum potassium on file in past 12 months     Recent Labs   Lab Test 01/22/19  0904   POTASSIUM 4.5         Justyna Weaver RN   "

## 2019-07-23 DIAGNOSIS — I10 HYPERTENSION GOAL BP (BLOOD PRESSURE) < 140/90: ICD-10-CM

## 2019-07-24 RX ORDER — METOPROLOL TARTRATE 50 MG
TABLET ORAL
Qty: 180 TABLET | Refills: 0 | Status: SHIPPED | OUTPATIENT
Start: 2019-07-24 | End: 2019-10-18

## 2019-07-24 NOTE — TELEPHONE ENCOUNTER
"Requested Prescriptions   Pending Prescriptions Disp Refills     metoprolol tartrate (LOPRESSOR) 50 MG tablet [Pharmacy Med Name: METOPROLOL TARTRATE 50MG TABS] 180 tablet 0     Sig: TAKE ONE TABLET BY MOUTH TWICE A DAY   Last Written Prescription Date:  4/24/19  Last Fill Quantity: 180,  # refills: 0   Last office visit: 1/22/2019 with prescribing provider:  1/22/19   Future Office Visit:        Beta-Blockers Protocol Passed - 7/23/2019  3:29 PM        Passed - Blood pressure under 140/90 in past 12 months     BP Readings from Last 3 Encounters:   01/22/19 126/58   01/11/19 140/60   10/12/18 116/58                 Passed - Patient is age 6 or older        Passed - Recent (12 mo) or future (30 days) visit within the authorizing provider's specialty     Patient had office visit in the last 12 months or has a visit in the next 30 days with authorizing provider or within the authorizing provider's specialty.  See \"Patient Info\" tab in inbasket, or \"Choose Columns\" in Meds & Orders section of the refill encounter.              Passed - Medication is active on med list        "

## 2019-07-24 NOTE — TELEPHONE ENCOUNTER
Prescription approved per [facility] Refill Protocol.    CHICO EspinalN, RN  [facility]

## 2019-08-01 DIAGNOSIS — R60.1 GENERALIZED EDEMA: ICD-10-CM

## 2019-08-02 RX ORDER — FUROSEMIDE 20 MG
TABLET ORAL
Qty: 30 TABLET | Refills: 3 | Status: SHIPPED | OUTPATIENT
Start: 2019-08-02 | End: 2019-10-18

## 2019-08-02 NOTE — TELEPHONE ENCOUNTER
"Requested Prescriptions   Pending Prescriptions Disp Refills     furosemide (LASIX) 20 MG tablet [Pharmacy Med Name: FUROSEMIDE 20MG TABS] 30 tablet 3     Sig: TAKE ONE TABLET BY MOUTH ONCE DAILY   Last Written Prescription Date:  4/2/19  Last Fill Quantity: 30,  # refills: 3   Last office visit: No previous visit found with prescribing provider:  1/22/19   Future Office Visit:        Diuretics (Including Combos) Protocol Passed - 8/1/2019 12:34 PM        Passed - Blood pressure under 140/90 in past 12 months     BP Readings from Last 3 Encounters:   01/22/19 126/58   01/11/19 140/60   10/12/18 116/58                 Passed - Recent (12 mo) or future (30 days) visit within the authorizing provider's specialty     Patient had office visit in the last 12 months or has a visit in the next 30 days with authorizing provider or within the authorizing provider's specialty.  See \"Patient Info\" tab in inbasket, or \"Choose Columns\" in Meds & Orders section of the refill encounter.              Passed - Medication is active on med list        Passed - Patient is age 18 or older        Passed - Normal serum creatinine on file in past 12 months     Recent Labs   Lab Test 01/22/19  0904   CR 1.25              Passed - Normal serum potassium on file in past 12 months     Recent Labs   Lab Test 01/22/19 0904   POTASSIUM 4.5                    Passed - Normal serum sodium on file in past 12 months     Recent Labs   Lab Test 01/22/19 0904                 "

## 2019-08-02 NOTE — TELEPHONE ENCOUNTER
Prescription approved per [facility] Refill Protocol.    CHICO EspinalN, RN  [facility]

## 2019-08-23 DIAGNOSIS — N18.2 TYPE 2 DIABETES MELLITUS WITH STAGE 2 CHRONIC KIDNEY DISEASE, WITHOUT LONG-TERM CURRENT USE OF INSULIN (H): ICD-10-CM

## 2019-08-23 DIAGNOSIS — E11.22 TYPE 2 DIABETES MELLITUS WITH STAGE 2 CHRONIC KIDNEY DISEASE, WITHOUT LONG-TERM CURRENT USE OF INSULIN (H): ICD-10-CM

## 2019-08-23 RX ORDER — GABAPENTIN 300 MG/1
CAPSULE ORAL
Qty: 180 CAPSULE | Refills: 0 | Status: SHIPPED | OUTPATIENT
Start: 2019-08-23 | End: 2019-10-18

## 2019-08-23 NOTE — TELEPHONE ENCOUNTER
Requested Prescriptions   Pending Prescriptions Disp Refills     gabapentin (NEURONTIN) 300 MG capsule [Pharmacy Med Name: GABAPENTIN 300MG CAPS] 180 capsule 0     Sig: TAKE TWO CAPSULES BY MOUTH AT BEDTIME       There is no refill protocol information for this order

## 2019-08-23 NOTE — TELEPHONE ENCOUNTER
gabapentin (NEURONTIN) 300 MG capsule      Last Written Prescription Date:  5/29/19  Last Fill Quantity: 180,   # refills: 0  Last Office Visit: 1/22/19  Future Office visit:       Routing refill request to provider for review/approval because:  Drug not on the [facility], [facility] or [facility] refill protocol or controlled substance

## 2019-09-03 DIAGNOSIS — E78.5 HYPERLIPIDEMIA LDL GOAL <100: ICD-10-CM

## 2019-09-04 RX ORDER — ATORVASTATIN CALCIUM 40 MG/1
TABLET, FILM COATED ORAL
Qty: 30 TABLET | Refills: 3 | Status: SHIPPED | OUTPATIENT
Start: 2019-09-04 | End: 2019-10-18

## 2019-09-04 NOTE — TELEPHONE ENCOUNTER
Routing refill request to provider for review/approval because:  Drug interaction warning    CHICO EspinalN, RN  [facility]

## 2019-09-04 NOTE — TELEPHONE ENCOUNTER
"Requested Prescriptions   Pending Prescriptions Disp Refills     atorvastatin (LIPITOR) 40 MG tablet [Pharmacy Med Name: ATORVASTATIN CALCIUM 40MG TABS] 30 tablet 3     Sig: TAKE ONE TABLET BY MOUTH ONCE DAILY   Last Written Prescription Date:  5/1/19  Last Fill Quantity: 30,  # refills: 3   Last office visit: 1/22/2019 with prescribing provider:  1/22/19   Future Office Visit:        Statins Protocol Passed - 9/3/2019 10:03 AM        Passed - LDL on file in past 12 months     Recent Labs   Lab Test 01/22/19  0904   LDL 50             Passed - No abnormal creatine kinase in past 12 months     No lab results found.             Passed - Recent (12 mo) or future (30 days) visit within the authorizing provider's specialty     Patient had office visit in the last 12 months or has a visit in the next 30 days with authorizing provider or within the authorizing provider's specialty.  See \"Patient Info\" tab in inbasket, or \"Choose Columns\" in Meds & Orders section of the refill encounter.              Passed - Medication is active on med list        Passed - Patient is age 18 or older        "

## 2019-10-11 DIAGNOSIS — E11.22 TYPE 2 DIABETES MELLITUS WITH STAGE 2 CHRONIC KIDNEY DISEASE, WITH LONG-TERM CURRENT USE OF INSULIN (H): ICD-10-CM

## 2019-10-11 DIAGNOSIS — N18.2 TYPE 2 DIABETES MELLITUS WITH STAGE 2 CHRONIC KIDNEY DISEASE, WITH LONG-TERM CURRENT USE OF INSULIN (H): ICD-10-CM

## 2019-10-11 DIAGNOSIS — Z79.4 TYPE 2 DIABETES MELLITUS WITH STAGE 2 CHRONIC KIDNEY DISEASE, WITH LONG-TERM CURRENT USE OF INSULIN (H): ICD-10-CM

## 2019-10-11 RX ORDER — INSULIN ASPART 100 [IU]/ML
INJECTION, SOLUTION INTRAVENOUS; SUBCUTANEOUS
Qty: 30 ML | Refills: 3 | OUTPATIENT
Start: 2019-10-11

## 2019-10-11 NOTE — TELEPHONE ENCOUNTER
Pharmacy calling. They need the max daily dose of insulin to fill this RX.     Huddled with Dr. Burleson. He advised 60 unit max a day.     Called and spoke to pharmacist at [facility] in [place]. Advised 60 units max/day.     CHICO EspinalN, RN  [facility]

## 2019-10-11 NOTE — TELEPHONE ENCOUNTER
"Requested Prescriptions   Pending Prescriptions Disp Refills     NOVOLOG FLEXPEN 100 UNIT/ML soln [Pharmacy Med Name: NOVOLOG FLEXPEN 100UNIT/ML SOPN] 30 mL 3     Sig: TAKE 15 UNITS BEFORE BREAKFAST, 18 UNITS BEFORE LUNCH AND 18 UNITS BEFORE DINNER PLUS USE OF MED DOSE SLIDING SCALE AS DIRECTED   Last Written Prescription Date:  10/7/19  Last Fill Quantity: 15 mL,  # refills: 0   Last office visit: No previous visit found with prescribing provider:  1/22/19   Future Office Visit:        Short Acting Insulin Protocol Failed - 10/11/2019  2:07 PM        Failed - Blood pressure less than 140/90 in past 6 months     BP Readings from Last 3 Encounters:   01/22/19 126/58   01/11/19 140/60   10/12/18 116/58                 Failed - HgbA1C in past 3 or 6 months     If HgbA1C is 8 or greater, it needs to be on file within the past 3 months.  If less than 8, must be on file within the past 6 months.     Recent Labs   Lab Test 01/22/19  0904   A1C 6.3*             Failed - Recent (6 mo) or future (30 days) visit within the authorizing provider's specialty     Patient had office visit in the last 6 months or has a visit in the next 30 days with authorizing provider or within the authorizing provider's specialty.  See \"Patient Info\" tab in inbasket, or \"Choose Columns\" in Meds & Orders section of the refill encounter.            Passed - LDL on file in past 12 months     Recent Labs   Lab Test 01/22/19  0904   LDL 50             Passed - Microalbumin on file in past 12 months     Recent Labs   Lab Test 10/12/18  1630   MICROL 34   UMALCR 27.60*             Passed - Serum creatinine on file in past 12 months     Recent Labs   Lab Test 01/22/19  0904   CR 1.25             Passed - Medication is active on med list        Passed - Patient is age 18 or older        "

## 2019-10-18 ENCOUNTER — OFFICE VISIT (OUTPATIENT)
Dept: FAMILY MEDICINE | Facility: OTHER | Age: 79
End: 2019-10-18
Payer: COMMERCIAL

## 2019-10-18 VITALS
SYSTOLIC BLOOD PRESSURE: 132 MMHG | HEART RATE: 62 BPM | RESPIRATION RATE: 18 BRPM | DIASTOLIC BLOOD PRESSURE: 80 MMHG | OXYGEN SATURATION: 97 % | TEMPERATURE: 96.8 F | BODY MASS INDEX: 32.58 KG/M2 | HEIGHT: 69 IN | WEIGHT: 220 LBS

## 2019-10-18 DIAGNOSIS — R60.1 GENERALIZED EDEMA: ICD-10-CM

## 2019-10-18 DIAGNOSIS — L23.0 ALLERGIC CONTACT DERMATITIS DUE TO METALS: ICD-10-CM

## 2019-10-18 DIAGNOSIS — N18.2 TYPE 2 DIABETES MELLITUS WITH STAGE 2 CHRONIC KIDNEY DISEASE, WITHOUT LONG-TERM CURRENT USE OF INSULIN (H): ICD-10-CM

## 2019-10-18 DIAGNOSIS — I73.9 INTERMITTENT CLAUDICATION (H): ICD-10-CM

## 2019-10-18 DIAGNOSIS — E78.5 HYPERLIPIDEMIA LDL GOAL <100: ICD-10-CM

## 2019-10-18 DIAGNOSIS — Z00.00 MEDICARE ANNUAL WELLNESS VISIT, SUBSEQUENT: Primary | ICD-10-CM

## 2019-10-18 DIAGNOSIS — Z00.00 ENCOUNTER FOR MEDICARE ANNUAL WELLNESS EXAM: ICD-10-CM

## 2019-10-18 DIAGNOSIS — I10 HYPERTENSION GOAL BP (BLOOD PRESSURE) < 140/90: ICD-10-CM

## 2019-10-18 DIAGNOSIS — N18.30 CKD (CHRONIC KIDNEY DISEASE) STAGE 3, GFR 30-59 ML/MIN (H): ICD-10-CM

## 2019-10-18 DIAGNOSIS — E11.22 TYPE 2 DIABETES MELLITUS WITH STAGE 2 CHRONIC KIDNEY DISEASE, WITHOUT LONG-TERM CURRENT USE OF INSULIN (H): ICD-10-CM

## 2019-10-18 LAB
CREAT UR-MCNC: 33 MG/DL
HBA1C MFR BLD: 5.8 % (ref 0–5.6)
MICROALBUMIN UR-MCNC: 9 MG/L
MICROALBUMIN/CREAT UR: 26.57 MG/G CR (ref 0–17)

## 2019-10-18 PROCEDURE — 99397 PER PM REEVAL EST PAT 65+ YR: CPT | Performed by: INTERNAL MEDICINE

## 2019-10-18 PROCEDURE — 36415 COLL VENOUS BLD VENIPUNCTURE: CPT | Performed by: INTERNAL MEDICINE

## 2019-10-18 PROCEDURE — 83036 HEMOGLOBIN GLYCOSYLATED A1C: CPT | Performed by: INTERNAL MEDICINE

## 2019-10-18 PROCEDURE — 82043 UR ALBUMIN QUANTITATIVE: CPT | Performed by: INTERNAL MEDICINE

## 2019-10-18 PROCEDURE — 99213 OFFICE O/P EST LOW 20 MIN: CPT | Mod: 25 | Performed by: INTERNAL MEDICINE

## 2019-10-18 RX ORDER — POTASSIUM CHLORIDE 750 MG/1
10 TABLET, EXTENDED RELEASE ORAL DAILY
Qty: 90 TABLET | Refills: 3 | Status: SHIPPED | OUTPATIENT
Start: 2019-10-18 | End: 2020-10-19

## 2019-10-18 RX ORDER — METOPROLOL TARTRATE 50 MG
50 TABLET ORAL 2 TIMES DAILY
Qty: 180 TABLET | Refills: 0 | Status: SHIPPED | OUTPATIENT
Start: 2019-10-18 | End: 2020-01-21

## 2019-10-18 RX ORDER — LISINOPRIL 20 MG/1
20 TABLET ORAL 2 TIMES DAILY
Qty: 60 TABLET | Refills: 5 | Status: SHIPPED | OUTPATIENT
Start: 2019-10-18 | End: 2020-05-20

## 2019-10-18 RX ORDER — FUROSEMIDE 20 MG
20 TABLET ORAL DAILY
Qty: 90 TABLET | Refills: 3 | Status: SHIPPED | OUTPATIENT
Start: 2019-10-18 | End: 2020-10-19

## 2019-10-18 RX ORDER — TRIAMCINOLONE ACETONIDE 1 MG/G
CREAM TOPICAL 2 TIMES DAILY
Qty: 80 G | Refills: 1 | Status: SHIPPED | OUTPATIENT
Start: 2019-10-18 | End: 2020-10-30

## 2019-10-18 RX ORDER — GABAPENTIN 300 MG/1
600 CAPSULE ORAL AT BEDTIME
Qty: 180 CAPSULE | Refills: 3 | Status: SHIPPED | OUTPATIENT
Start: 2019-10-18 | End: 2020-10-19

## 2019-10-18 RX ORDER — AMLODIPINE BESYLATE 5 MG/1
5 TABLET ORAL DAILY
Qty: 90 TABLET | Refills: 3 | Status: SHIPPED | OUTPATIENT
Start: 2019-10-18 | End: 2020-10-19

## 2019-10-18 RX ORDER — ATORVASTATIN CALCIUM 40 MG/1
40 TABLET, FILM COATED ORAL DAILY
Qty: 90 TABLET | Refills: 3 | Status: SHIPPED | OUTPATIENT
Start: 2019-10-18 | End: 2020-10-19

## 2019-10-18 ASSESSMENT — PAIN SCALES - GENERAL: PAINLEVEL: NO PAIN (0)

## 2019-10-18 ASSESSMENT — MIFFLIN-ST. JEOR: SCORE: 1698.29

## 2019-10-18 ASSESSMENT — ACTIVITIES OF DAILY LIVING (ADL): CURRENT_FUNCTION: NO ASSISTANCE NEEDED

## 2019-10-18 NOTE — PROGRESS NOTES
"SUBJECTIVE:   Pj Zhao is a 78 year old male who presents for Preventive Visit.    Are you in the first 12 months of your Medicare coverage?  No    Healthy Habits:     In general, how would you rate your overall health?  Good    Frequency of exercise:  4-5 days/week    Duration of exercise:  45-60 minutes    Do you usually eat at least 4 servings of fruit and vegetables a day, include whole grains    & fiber and avoid regularly eating high fat or \"junk\" foods?  Yes    Taking medications regularly:  Yes    Barriers to taking medications:  Not applicable    Medication side effects:  Not applicable    Ability to successfully perform activities of daily living:  No assistance needed    Home Safety:  No safety concerns identified    Hearing Impairment:  No hearing concerns    In the past 6 months, have you been bothered by leaking of urine?  No    In general, how would you rate your overall mental or emotional health?  Excellent      PHQ-2 Total Score: 0    Additional concerns today:  No    Do you feel safe in your environment? Yes    Do you have a Health Care Directive? No: Advance care planning reviewed with patient; information given to patient to review.      Fall risk  Fallen 2 or more times in the past year?: No  Any fall with injury in the past year?: No    Cognitive Screening   1) Repeat 3 items (Leader, Season, Table)    2) Clock draw: NORMAL  3) 3 item recall: Recalls 3 objects  Results: 3 items recalled: COGNITIVE IMPAIRMENT LESS LIKELY    Mini-CogTM Copyright S Alicia. Licensed by the author for use in [facility]; reprinted with permission (elaine@.[facility]). All rights reserved.      Do you have sleep apnea, excessive snoring or daytime drowsiness?: no    Reviewed and updated as needed this visit by clinical staff  Tobacco  Allergies  Meds  Med Hx  Surg Hx  Fam Hx  Soc Hx        Reviewed and updated as needed this visit by Provider        Social History     Tobacco Use     Smoking " status: Never Smoker     Smokeless tobacco: Never Used   Substance Use Topics     Alcohol use: No     Alcohol/week: 0.0 standard drinks     If you drink alcohol do you typically have >3 drinks per day or >7 drinks per week? No    Alcohol Use 10/18/2019   Prescreen: >3 drinks/day or >7 drinks/week? No           -------------------------------------    Current providers sharing in care for this patient include:   Patient Care Team:  Porfirio Burleson DO as PCP - General (Internal Medicine)  Porfirio Burleson DO as Assigned PCP    The following health maintenance items are reviewed in Epic and correct as of today:  Health Maintenance   Topic Date Due     PNEUMOCOCCAL IMMUNIZATION 65+ HIGH/HIGHEST RISK (2 of 2 - PCV13) 12/05/2007     MEDICARE ANNUAL WELLNESS VISIT  12/04/2013     COLONOSCOPY  06/28/2016     FALL RISK ASSESSMENT  05/15/2019     A1C  07/22/2019     INFLUENZA VACCINE (1) 09/01/2019     MICROALBUMIN  10/12/2019     DIABETIC FOOT EXAM  10/12/2019     ADVANCE CARE PLANNING  11/27/2019     BMP  01/22/2020     LIPID  01/22/2020     EYE EXAM  05/20/2020     TSH W/FREE T4 REFLEX  10/12/2020     DTAP/TDAP/TD IMMUNIZATION (3 - Td) 12/16/2024     PHQ-2  Completed     ZOSTER IMMUNIZATION  Completed     IPV IMMUNIZATION  Aged Out     MENINGITIS IMMUNIZATION  Aged Out     Lab work is in process  Labs reviewed in EPIC  BP Readings from Last 3 Encounters:   10/18/19 132/80   01/22/19 126/58   01/11/19 140/60    Wt Readings from Last 3 Encounters:   10/18/19 99.8 kg (220 lb)   01/22/19 99.9 kg (220 lb 5.1 oz)   01/11/19 98.4 kg (217 lb)                  Patient Active Problem List   Diagnosis     History of malignant neoplasm of large intestine     Coronary atherosclerosis of native coronary artery     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)     Hyperlipidemia LDL goal <100     Microalbuminuria     Hypertension goal BP (blood pressure) < 140/90     ED (erectile dysfunction)      Acute ischemic stroke- left christian     DVT prophylaxis     Advanced directives, counseling/discussion     S/P coronary artery stent placement LAD in 2004     Dysarthria     Dysphagia     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, right     Chronic ischemic heart disease     Acute right-sided weakness     Slurring of speech     Obesity, Class I, BMI 30-34.9     CKD (chronic kidney disease) stage 2, GFR 60-89 ml/min     Type 2 diabetes mellitus with stage 2 chronic kidney disease, with long-term current use of insulin (H)     Malignant neoplasm of colon, unspecified part of colon (H), hx of     Vitamin B12 deficiency (non anemic)     CKD (chronic kidney disease) stage 3, GFR 30-59 ml/min (H)     Past Surgical History:   Procedure Laterality Date     C APPENDECTOMY  3/11/2003     C NONSPECIFIC PROCEDURE      Laser surgery for destruction of kidney stones     C NONSPECIFIC PROCEDURE  3/11/2003    Lower anterior sigmoid resection with end-to-end anastomosis.       CARDIAC SURGERY  05/2018     COLONOSCOPY  03/06/07    repeat in 3-5 yrs.     COLONOSCOPY  6/28/2011    Procedure:COMBINED COLONOSCOPY, REMOVE TUMOR/POLYP/LESION BY SNARE; Surgeon:JOANN PATEL; Location: GI     HC REMOVAL OF TONSILS,<11 Y/O      Tonsils <10y.o.     PHACOEMULSIFICATION WITH STANDARD INTRAOCULAR LENS IMPLANT Right 1/15/2015    Procedure: PHACOEMULSIFICATION WITH STANDARD INTRAOCULAR LENS IMPLANT;  Surgeon: Jamarcus Ferrer MD;  Location:  OR     PHACOEMULSIFICATION WITH STANDARD INTRAOCULAR LENS IMPLANT Left 2/19/2015    Procedure: PHACOEMULSIFICATION WITH STANDARD INTRAOCULAR LENS IMPLANT;  Surgeon: Jamarcus Ferrer MD;  Location:  OR       Social History     Tobacco Use     Smoking status: Never Smoker     Smokeless tobacco: Never Used   Substance Use Topics     Alcohol use: No     Alcohol/week: 0.0 standard drinks     Family History   Problem Relation Age of Onset     Diabetes Mother      Obesity Mother      Cancer Sister         Mouth          Current Outpatient Medications   Medication Sig Dispense Refill     acetaminophen (TYLENOL) 325 MG tablet Take 650 mg by mouth       amLODIPine (NORVASC) 5 MG tablet Take 1 tablet (5 mg) by mouth daily 90 tablet 3     aspirin (ASA) 81 MG tablet Take 81 mg by mouth daily       atorvastatin (LIPITOR) 40 MG tablet Take 1 tablet (40 mg) by mouth daily 90 tablet 3     B-D U/F 31G X 8 MM insulin pen needle AS DIRECTED 100 each 5     furosemide (LASIX) 20 MG tablet Take 1 tablet (20 mg) by mouth daily 90 tablet 3     gabapentin (NEURONTIN) 300 MG capsule Take 2 capsules (600 mg) by mouth At Bedtime 180 capsule 3     insulin glargine (BASAGLAR KWIKPEN) 100 UNIT/ML pen Inject 35 Units Subcutaneous daily 30 mL 0     ketoconazole (NIZORAL) 2 % cream APPLY TO AFFECTED AREA(S) TOPICALLY AS DIRECTED 15 g 0     lisinopril (PRINIVIL/ZESTRIL) 20 MG tablet Take 1 tablet (20 mg) by mouth 2 times daily 60 tablet 5     metFORMIN (GLUCOPHAGE) 500 MG tablet Take 2 tablets (1,000 mg) by mouth 2 times daily (with meals) 360 tablet 1     metoprolol tartrate (LOPRESSOR) 50 MG tablet Take 1 tablet (50 mg) by mouth 2 times daily 180 tablet 0     mupirocin (BACTROBAN) 2 % ointment Apply 1 Application topically       nitroGLYcerin (NITROSTAT) 0.4 MG sublingual tablet Place 1 tablet (0.4 mg) under the tongue See Admin Instructions for chest pain 25 tablet 3     NOVOLOG FLEXPEN 100 UNIT/ML soln TAKE 15 UNITS BEFORE BREAKFAST, 18 UNITS BEFORE LUNCH AND 18 UNITS BEFORE DINNER PLUS USE OF MED DOSE SLIDING SCALE AS DIRECTED 15 mL 0     ONETOUCH ULTRA test strip USE TO TEST FOUR TIMES A  each 5     potassium chloride ER (K-DUR/KLOR-CON M) 10 MEQ CR tablet Take 1 tablet (10 mEq) by mouth daily 90 tablet 3     triamcinolone (KENALOG) 0.1 % external cream Apply topically 2 times daily 80 g 1     Allergies   Allergen Reactions     No Known Drug Allergies      Recent Labs   Lab Test 10/18/19  0907 01/22/19  0904 10/12/18  1616 05/15/18  1037   "05/02/18  0145  04/11/17  0909 04/26/16  0903  03/07/14  1127  06/12/12  1049   A1C 5.8* 6.3* 6.6* 6.3*  --   --    < > 6.9* 7.1*   < > 7.5*   < > 6.9*   LDL  --  50  --   --   --   --   --  43 46   < > 78   < > 65   HDL  --  28*  --   --   --   --   --  38* 33*   < >  --   --   --    TRIG  --  111  --   --   --   --   --  158* 174*   < >  --   --   --    ALT  --   --   --   --   --  24  --   --   --   --  45  --  19   CR  --  1.25  --  1.38*   < > 1.24  --  1.14 1.02   < > 0.89   < > 0.98   GFRESTIMATED  --  54*  --  50*   < > 56*  --  62 71   < > 83   < > 75   GFRESTBLACK  --  63  --  60*   < > 68  --  75 86   < > >90   < > >90   POTASSIUM  --  4.5  --  4.9   < > 4.1  --  4.3 4.5   < > 4.5   < > 4.5   TSH  --   --  1.75  --   --   --   --   --  1.75  --  1.37   < >  --     < > = values in this interval not displayed.          Review of Systems  CONSTITUTIONAL: NEGATIVE for fever, chills, change in weight  INTEGUMENTARY/SKIN: Positive for midline suprapubic contact dermatitis associated with belt buckle.  EYES: NEGATIVE for vision changes or irritation  ENT/MOUTH: NEGATIVE for ear, mouth and throat problems  RESP: NEGATIVE for significant cough or SOB  CV: NEGATIVE for chest pain, palpitations or peripheral edema  GI: NEGATIVE for nausea, abdominal pain, heartburn, or change in bowel habits  : NEGATIVE for frequency, dysuria, or hematuria  MUSCULOSKELETAL: Patient does complain of intermittent leg pain associated with exertion.  Resolves with prompt rest.  NEURO: NEGATIVE for weakness, dizziness or paresthesias  ENDOCRINE: NEGATIVE for temperature intolerance, skin/hair changes  HEME: NEGATIVE for bleeding problems  PSYCHIATRIC: NEGATIVE for changes in mood or affect    OBJECTIVE:   /80 (BP Location: Right arm, Patient Position: Sitting, Cuff Size: Adult Large)   Pulse 62   Temp 96.8  F (36  C) (Temporal)   Resp 18   Ht 1.753 m (5' 9\")   Wt 99.8 kg (220 lb)   SpO2 97%   BMI 32.49 kg/m   Estimated " "body mass index is 32.49 kg/m  as calculated from the following:    Height as of this encounter: 1.753 m (5' 9\").    Weight as of this encounter: 99.8 kg (220 lb).  Physical Exam  GENERAL: healthy, alert and no distress  EYES: Eyes grossly normal to inspection, PERRL and conjunctivae and sclerae normal  HENT: ear canals and TM's normal, nose and mouth without ulcers or lesions  NECK: no adenopathy, no asymmetry, masses, or scars and thyroid normal to palpation  RESP: lungs clear to auscultation - no rales, rhonchi or wheezes  CV: regular rate and rhythm, normal S1 S2, no S3 or S4, no murmur, click or rub, minimal peripheral edema.  Somewhat weakened peripheral pulses noted.  ABDOMEN: soft, nontender, no hepatosplenomegaly, no masses and bowel sounds normal  MS: no gross musculoskeletal defects noted, no edema  SKIN: no suspicious lesions noted.  Belt buckle rashes present.  Suspect nickel allergy  NEURO: Normal strength and tone, mentation intact and speech normal  PSYCH: mentation appears normal, affect normal/bright    Diagnostic Test Results:  Results for orders placed or performed in visit on 10/18/19   HEMOGLOBIN A1C   Result Value Ref Range    Hemoglobin A1C 5.8 (H) 0 - 5.6 %   Albumin Random Urine Quantitative with Creat Ratio   Result Value Ref Range    Creatinine Urine 33 mg/dL    Albumin Urine mg/L 9 mg/L    Albumin Urine mg/g Cr 26.57 (H) 0 - 17 mg/g Cr       ASSESSMENT / PLAN:       ICD-10-CM    1. Medicare annual wellness visit, subsequent Z00.00    2. Intermittent claudication (H) I73.9 US Low Ext Arterial Dop Seg Pres w Ex   3. Generalized edema R60.1 furosemide (LASIX) 20 MG tablet     potassium chloride ER (K-DUR/KLOR-CON M) 10 MEQ CR tablet   4. Type 2 diabetes mellitus with stage 2 chronic kidney disease, without long-term current use of insulin (H) E11.22 HEMOGLOBIN A1C    N18.2 Albumin Random Urine Quantitative with Creat Ratio     gabapentin (NEURONTIN) 300 MG capsule     lisinopril " "(PRINIVIL/ZESTRIL) 20 MG tablet     metFORMIN (GLUCOPHAGE) 500 MG tablet   5. CKD (chronic kidney disease) stage 3, GFR 30-59 ml/min (H) N18.3    6. Hypertension goal BP (blood pressure) < 140/90 I10 amLODIPine (NORVASC) 5 MG tablet     metoprolol tartrate (LOPRESSOR) 50 MG tablet   7. Hyperlipidemia LDL goal <100 E78.5 atorvastatin (LIPITOR) 40 MG tablet   8. Allergic contact dermatitis due to metals L23.0 triamcinolone (KENALOG) 0.1 % external cream       End of Life Planning:  Patient currently has an advanced directive: No.  I have verified the patient's ablity to prepare an advanced directive/make health care decisions.  Literature was provided to assist patient in preparing an advanced directive.    COUNSELING:  Reviewed preventive health counseling, as reflected in patient instructions       Consider AAA screening for ages 65-75 and smoking history       Regular exercise       Healthy diet/nutrition       Vision screening       Hearing screening       Dental care       Bladder control    Estimated body mass index is 32.49 kg/m  as calculated from the following:    Height as of this encounter: 1.753 m (5' 9\").    Weight as of this encounter: 99.8 kg (220 lb).    Weight management plan: Discussed healthy diet and exercise guidelines     reports that he has never smoked. He has never used smokeless tobacco.      Appropriate preventive services were discussed with this patient, including applicable screening as appropriate for cardiovascular disease, diabetes, osteopenia/osteoporosis, and glaucoma.  As appropriate for age/gender, discussed screening for colorectal cancer, prostate cancer, breast cancer, and cervical cancer. Checklist reviewing preventive services available has been given to the patient.    Reviewed patients plan of care and provided an AVS. The Basic Care Plan (routine screening as documented in Health Maintenance) for Pj meets the Care Plan requirement. This Care Plan has been established " and reviewed with the Patient.    Counseling Resources:  ATP IV Guidelines  Pooled Cohorts Equation Calculator  Breast Cancer Risk Calculator  FRAX Risk Assessment  ICSI Preventive Guidelines  Dietary Guidelines for Americans, 2010  USDA's MyPlate  ASA Prophylaxis  Lung CA Screening    Porfirio Burleson DO  [facility]    Identified Health Risks:

## 2019-10-18 NOTE — LETTER
October 23, 2019      Pj CRYSTAL Pavel  [address] [facility] [address]        Dear ,    We are writing to inform you of your test results.    Microalbumin is minimally elevated suggesting slight loss of protein in the urine.   The hemoglobin A1c is excellent at 5.8 suggesting very good blood sugar control.     Feel free to contact me via the office or My Chart if you have any questions regarding the above.     Resulted Orders   HEMOGLOBIN A1C   Result Value Ref Range    Hemoglobin A1C 5.8 (H) 0 - 5.6 %      Comment:      Normal <5.7% Prediabetes 5.7-6.4%  Diabetes 6.5% or higher - adopted from ADA   consensus guidelines.     Albumin Random Urine Quantitative with Creat Ratio   Result Value Ref Range    Creatinine Urine 33 mg/dL    Albumin Urine mg/L 9 mg/L    Albumin Urine mg/g Cr 26.57 (H) 0 - 17 mg/g Cr       If you have any questions or concerns, please call the clinic at the number listed above.       Sincerely,        Porfirio Burleson, DO

## 2019-10-21 NOTE — PATIENT INSTRUCTIONS
Patient Education   Personalized Prevention Plan  You are due for the preventive services outlined below.  Your care team is available to assist you in scheduling these services.  If you have already completed any of these items, please share that information with your care team to update in your medical record.  Health Maintenance Due   Topic Date Due     Pneumococcal Vaccine (2 of 2 - PCV13) 12/05/2007     Annual Wellness Visit  12/04/2013     Colonscopy  06/28/2016     Flu Vaccine (1) 09/01/2019     Diabetic Foot Exam  10/12/2019

## 2019-10-23 NOTE — RESULT ENCOUNTER NOTE
Dear Pj, your recent test results are attached.  Microalbumin is minimally elevated suggesting slight loss of protein in the urine.  The hemoglobin A1c is excellent at 5.8 suggesting very good blood sugar control.    Feel free to contact me via the office or My Chart if you have any questions regarding the above.

## 2019-10-31 ENCOUNTER — HOSPITAL ENCOUNTER (OUTPATIENT)
Dept: ULTRASOUND IMAGING | Facility: CLINIC | Age: 79
Discharge: HOME | End: 2019-10-31
Attending: INTERNAL MEDICINE | Admitting: INTERNAL MEDICINE
Payer: COMMERCIAL

## 2019-10-31 DIAGNOSIS — I73.9 INTERMITTENT CLAUDICATION (H): ICD-10-CM

## 2019-10-31 PROCEDURE — 93924 LWR XTR VASC STDY BILAT: CPT

## 2019-10-31 NOTE — LETTER
November 7, 2019      Pj Zhao  [address] [facility] [address]        Dear ,    We are writing to inform you of your test results.    Ultrasound Doppler studies of the lower extremities demonstrate normal circulation.   Feel free to contact me via the office or My Chart if you have any questions regarding the above.     Resulted Orders   US RAISA Doppler with Exercise Bilateral    Narrative    ULTRASOUND ANKLE-BRACHIAL INDEX DOPPLER WITH EXERCISE BILATERAL    10/31/2019 2:57 PM     HISTORY: Intermittent claudication (H).    COMPARISON: None.    FINDINGS:  Right RAISA: 1.08.  Left RAISA: 1.09.    Waveforms: Not reliable due to artifact.     Right exercise RAISA: 1.43.  Left exercise RAISA: 0.74      Impression    IMPRESSION:   1. Right resting and exercise RAISA are normal without evidence of  arterial insufficiency.  2. Normal left resting RAISA with moderate exercise-induced arterial  insufficiency.     RAISA CRITERIA:  >0.95 Normal  0.90 - 0.94 Mild  0.5 - 0.89 Moderate  0.2 - 0.49 Severe  <0.2 Critical    SALAS GONZALEZ, DO       If you have any questions or concerns, please call the clinic at the number listed above.       Sincerely,      Porfirio Burleson, DO

## 2019-11-07 NOTE — RESULT ENCOUNTER NOTE
Dear Pj, your recent test results are attached.    Ultrasound Doppler studies of the lower extremities demonstrate normal circulation.  Feel free to contact me via the office or My Chart if you have any questions regarding the above.

## 2019-11-12 ENCOUNTER — TRANSFERRED RECORDS (OUTPATIENT)
Dept: HEALTH INFORMATION MANAGEMENT | Facility: CLINIC | Age: 79
End: 2019-11-12

## 2019-11-15 ENCOUNTER — TELEPHONE (OUTPATIENT)
Dept: INTERNAL MEDICINE | Facility: CLINIC | Age: 79
End: 2019-11-15

## 2019-11-15 DIAGNOSIS — Z79.4 TYPE 2 DIABETES MELLITUS WITH STAGE 2 CHRONIC KIDNEY DISEASE, WITH LONG-TERM CURRENT USE OF INSULIN (H): ICD-10-CM

## 2019-11-15 DIAGNOSIS — N18.2 TYPE 2 DIABETES MELLITUS WITH STAGE 2 CHRONIC KIDNEY DISEASE, WITH LONG-TERM CURRENT USE OF INSULIN (H): ICD-10-CM

## 2019-11-15 DIAGNOSIS — E11.22 TYPE 2 DIABETES MELLITUS WITH STAGE 2 CHRONIC KIDNEY DISEASE, WITH LONG-TERM CURRENT USE OF INSULIN (H): ICD-10-CM

## 2019-11-15 RX ORDER — INSULIN ASPART 100 [IU]/ML
INJECTION, SOLUTION INTRAVENOUS; SUBCUTANEOUS
Qty: 30 ML | Refills: 5 | Status: SHIPPED | OUTPATIENT
Start: 2019-11-15 | End: 2019-11-18

## 2019-11-15 NOTE — TELEPHONE ENCOUNTER
"Requested Prescriptions   Pending Prescriptions Disp Refills     NOVOLOG FLEXPEN 100 UNIT/ML soln [Pharmacy Med Name: NOVOLOG FLEXPEN 100UNIT/ML SOPN] 30 mL 3     Sig: TAKE 15 UNITS BEFORE BREAKFAST, 18 UNITS BEFORE LUNCH AND 18 UNITS BEFORE DINNER PLUS USE OF MED DOSE SLIDING SCALE AS DIRECTED   Last Written Prescription Date:  10/7/19  Last Fill Quantity: 15 ml,  # refills: 0   Last office visit: 10/18/19 Benjy  Future Office Visit:        Short Acting Insulin Protocol Passed - 11/15/2019  9:04 AM        Passed - Blood pressure less than 140/90 in past 6 months     BP Readings from Last 3 Encounters:   10/18/19 132/80   01/22/19 126/58   01/11/19 140/60           Passed - LDL on file in past 12 months     Recent Labs   Lab Test 01/22/19  0904   LDL 50           Passed - Microalbumin on file in past 12 months     Recent Labs   Lab Test 10/18/19  0921   MICROL 9   UMALCR 26.57*           Passed - Serum creatinine on file in past 12 months     Recent Labs   Lab Test 01/22/19  0904   CR 1.25           Passed - HgbA1C in past 3 or 6 months     If HgbA1C is 8 or greater, it needs to be on file within the past 3 months.  If less than 8, must be on file within the past 6 months.     Recent Labs   Lab Test 10/18/19  0907   A1C 5.8*           Passed - Medication is active on med list        Passed - Patient is age 18 or older        Passed - Recent (6 mo) or future (30 days) visit within the authorizing provider's specialty     Patient had office visit in the last 6 months or has a visit in the next 30 days with authorizing provider or within the authorizing provider's specialty.  See \"Patient Info\" tab in inbasket, or \"Choose Columns\" in Meds & Orders section of the refill encounter.            "

## 2019-11-15 NOTE — TELEPHONE ENCOUNTER
Routing refill request to provider for review/approval because:  Labs out of range:  Microalbumin    Karli Helms RN on 11/15/2019 at 9:15 AM

## 2019-11-18 NOTE — TELEPHONE ENCOUNTER
Per Sue in [place] they need Max number of units he can use. Please send new script with this included.     Routing to pcp.     Maci Jauregui MA

## 2019-12-03 ENCOUNTER — TELEPHONE (OUTPATIENT)
Dept: FAMILY MEDICINE | Facility: OTHER | Age: 79
End: 2019-12-03

## 2019-12-03 NOTE — TELEPHONE ENCOUNTER
Reason for Call:  Other prescription    Detailed comments: Patient states his insulin needs to be changed for 2020. Insurance will not be covering his current insulin next year.     Phone Number Patient can be reached at: Home number on file [phone] (home)    Best Time: any     Can we leave a detailed message on this number? YES    Call taken on 12/3/2019 at 11:40 AM by Keri Dennison

## 2019-12-11 ENCOUNTER — TRANSFERRED RECORDS (OUTPATIENT)
Dept: HEALTH INFORMATION MANAGEMENT | Facility: CLINIC | Age: 79
End: 2019-12-11

## 2019-12-11 LAB — RETINOPATHY: NORMAL

## 2019-12-18 ENCOUNTER — TRANSFERRED RECORDS (OUTPATIENT)
Dept: HEALTH INFORMATION MANAGEMENT | Facility: CLINIC | Age: 79
End: 2019-12-18

## 2019-12-18 DIAGNOSIS — Z79.4 TYPE 2 DIABETES MELLITUS WITH STAGE 2 CHRONIC KIDNEY DISEASE, WITH LONG-TERM CURRENT USE OF INSULIN (H): ICD-10-CM

## 2019-12-18 DIAGNOSIS — E11.22 TYPE 2 DIABETES MELLITUS WITH STAGE 2 CHRONIC KIDNEY DISEASE, WITH LONG-TERM CURRENT USE OF INSULIN (H): ICD-10-CM

## 2019-12-18 DIAGNOSIS — N18.2 TYPE 2 DIABETES MELLITUS WITH STAGE 2 CHRONIC KIDNEY DISEASE, WITH LONG-TERM CURRENT USE OF INSULIN (H): ICD-10-CM

## 2019-12-18 RX ORDER — INSULIN GLARGINE 100 [IU]/ML
35 INJECTION, SOLUTION SUBCUTANEOUS DAILY
Qty: 30 ML | Refills: 0 | Status: SHIPPED | OUTPATIENT
Start: 2019-12-18 | End: 2020-01-21

## 2019-12-18 NOTE — TELEPHONE ENCOUNTER
Routing refill request to provider for review/approval because:  Labs out of range:  Microalbumin    CHICO EspinalN, RN  [facility]

## 2019-12-18 NOTE — TELEPHONE ENCOUNTER
"Requested Prescriptions   Pending Prescriptions Disp Refills     insulin glargine (BASAGLAR KWIKPEN) 100 UNIT/ML pen [Pharmacy Med Name: BASAGLAR KWIKPEN 100UNIT/ML SOPN] 30 mL 0     Sig: INJECT 35 UNITS SUBCUTANEOUS DAILY   Last Written Prescription Date:  3/26/19  Last Fill Quantity: 30 mL,  # refills: 0   Last office visit: 10/18/2019 with prescribing provider:  10/18/19   Future Office Visit:        Long Acting Insulin Protocol Passed - 12/18/2019  8:42 AM        Passed - Blood pressure less than 140/90 in past 6 months     BP Readings from Last 3 Encounters:   10/18/19 132/80   01/22/19 126/58   01/11/19 140/60                 Passed - LDL on file in past 12 months     Recent Labs   Lab Test 01/22/19  0904   LDL 50             Passed - Microalbumin on file in past 12 months     Recent Labs   Lab Test 10/18/19  0921   MICROL 9   UMALCR 26.57*             Passed - Serum creatinine on file in past 12 months     Recent Labs   Lab Test 01/22/19  0904   CR 1.25             Passed - HgbA1C in past 3 or 6 months     If HgbA1C is 8 or greater, it needs to be on file within the past 3 months.  If less than 8, must be on file within the past 6 months.     Recent Labs   Lab Test 10/18/19  0907   A1C 5.8*             Passed - Medication is active on med list        Passed - Patient is age 18 or older        Passed - Recent (6 mo) or future (30 days) visit within the authorizing provider's specialty     Patient had office visit in the last 6 months or has a visit in the next 30 days with authorizing provider or within the authorizing provider's specialty.  See \"Patient Info\" tab in inbasket, or \"Choose Columns\" in Meds & Orders section of the refill encounter.            "

## 2019-12-30 DIAGNOSIS — E11.65 TYPE 2 DIABETES MELLITUS WITH HYPERGLYCEMIA (H): ICD-10-CM

## 2019-12-31 NOTE — TELEPHONE ENCOUNTER
Prescription approved per [facility] Refill Protocol.    CHICO EspinalN, RN  [facility]

## 2019-12-31 NOTE — TELEPHONE ENCOUNTER
"Requested Prescriptions   Pending Prescriptions Disp Refills     ONETOUCH ULTRA test strip [Pharmacy Med Name: ONETOUCH ULTRA BLUE  STRP] 100 each 5     Sig: USE TO TEST FOUR TIMES A DAY   Last Written Prescription Date:  4/2/19  Last Fill Quantity: 100,  # refills: 5   Last office visit: No previous visit found with prescribing provider:  10/18/19   Future Office Visit:        Diabetic Supplies Protocol Passed - 12/30/2019 11:41 AM        Passed - Medication is active on med list        Passed - Patient is 18 years of age or older        Passed - Recent (6 mo) or future (30 days) visit within the authorizing provider's specialty     Patient had office visit in the last 6 months or has a visit in the next 30 days with authorizing provider.  See \"Patient Info\" tab in inbasket, or \"Choose Columns\" in Meds & Orders section of the refill encounter.            "

## 2020-01-20 DIAGNOSIS — I10 HYPERTENSION GOAL BP (BLOOD PRESSURE) < 140/90: ICD-10-CM

## 2020-01-21 ENCOUNTER — TELEPHONE (OUTPATIENT)
Dept: FAMILY MEDICINE | Facility: OTHER | Age: 80
End: 2020-01-21

## 2020-01-21 DIAGNOSIS — E11.22 TYPE 2 DIABETES MELLITUS WITH STAGE 2 CHRONIC KIDNEY DISEASE, WITH LONG-TERM CURRENT USE OF INSULIN (H): ICD-10-CM

## 2020-01-21 DIAGNOSIS — N18.2 TYPE 2 DIABETES MELLITUS WITH STAGE 2 CHRONIC KIDNEY DISEASE, WITH LONG-TERM CURRENT USE OF INSULIN (H): ICD-10-CM

## 2020-01-21 DIAGNOSIS — Z79.4 TYPE 2 DIABETES MELLITUS WITH STAGE 2 CHRONIC KIDNEY DISEASE, WITH LONG-TERM CURRENT USE OF INSULIN (H): ICD-10-CM

## 2020-01-21 RX ORDER — METOPROLOL TARTRATE 50 MG
TABLET ORAL
Qty: 180 TABLET | Refills: 1 | Status: SHIPPED | OUTPATIENT
Start: 2020-01-21 | End: 2020-07-21

## 2020-01-21 RX ORDER — INSULIN GLARGINE 100 [IU]/ML
INJECTION, SOLUTION SUBCUTANEOUS
Qty: 30 ML | Refills: 1 | Status: SHIPPED | OUTPATIENT
Start: 2020-01-21 | End: 2020-01-22 | Stop reason: ALTCHOICE

## 2020-01-21 NOTE — TELEPHONE ENCOUNTER
LM for patient to call back. Please relay message below.     Spoke with Sue in [place]. According to the pharmacist. Both are covered the basgalar is unable to fill until 2/19 per insurance. His Novalog at this time is $600 due to patient has a high deductible and its a new year. Per pharmacy patient should contact his insurance to see if there is something cheaper. There is no way for Pharmacy to know.         Maci Jauregui MA

## 2020-01-21 NOTE — TELEPHONE ENCOUNTER
Patient calling stating Baslagar and Novalog is no longer covered by his insurance. Please advise on what other insulin can be sent. States he is done to 1 and half pens of Novalog. Please advise

## 2020-01-21 NOTE — TELEPHONE ENCOUNTER
The patient should contact his pharmacist and ask which medicines are covered.  Then I will be happy to change his medications.    Benjy

## 2020-01-21 NOTE — TELEPHONE ENCOUNTER
"Requested Prescriptions   Pending Prescriptions Disp Refills     insulin glargine (BASAGLAR KWIKPEN) 100 UNIT/ML pen [Pharmacy Med Name: BASAGLAR KWIKPEN 100UNIT/ML SOPN] 30 mL 0     Sig: INJECT 35 UNITS SUBCUTANEOUSLY DAILY   Last Written Prescription Date:  12/18/19  Last Fill Quantity: 30 mL,  # refills: 0   Last office visit: 10/18/2019 with prescribing provider:  10/18/19   Future Office Visit:        Long Acting Insulin Protocol Passed - 1/21/2020 12:11 PM        Passed - Blood pressure less than 140/90 in past 6 months     BP Readings from Last 3 Encounters:   10/18/19 132/80   01/22/19 126/58   01/11/19 140/60                 Passed - LDL on file in past 12 months     Recent Labs   Lab Test 01/22/19  0904   LDL 50             Passed - Microalbumin on file in past 12 months     Recent Labs   Lab Test 10/18/19  0921   MICROL 9   UMALCR 26.57*             Passed - Serum creatinine on file in past 12 months     Recent Labs   Lab Test 01/22/19  0904   CR 1.25             Passed - HgbA1C in past 3 or 6 months     If HgbA1C is 8 or greater, it needs to be on file within the past 3 months.  If less than 8, must be on file within the past 6 months.     Recent Labs   Lab Test 10/18/19  0907   A1C 5.8*             Passed - Medication is active on med list        Passed - Patient is age 18 or older        Passed - Recent (6 mo) or future (30 days) visit within the authorizing provider's specialty     Patient had office visit in the last 6 months or has a visit in the next 30 days with authorizing provider or within the authorizing provider's specialty.  See \"Patient Info\" tab in inbasket, or \"Choose Columns\" in Meds & Orders section of the refill encounter.            "

## 2020-01-21 NOTE — TELEPHONE ENCOUNTER
"Metoprolol Tart.   Last Written Prescription Date:  10/18/2019  Last Fill Quantity: 180,  # refills: 0   Last office visit: 10/18/2019 with prescribing provider:  mt   Future Office Visit:  None  Prescription approved per [facility] Refill Protocol.     Requested Prescriptions   Pending Prescriptions Disp Refills     metoprolol tartrate (LOPRESSOR) 50 MG tablet [Pharmacy Med Name: METOPROLOL TARTRATE 50MG TABS] 180 tablet 0     Sig: TAKE ONE TABLET BY MOUTH TWICE A DAY       Beta-Blockers Protocol Passed - 1/20/2020 10:12 AM        Passed - Blood pressure under 140/90 in past 12 months     BP Readings from Last 3 Encounters:   10/18/19 132/80   01/22/19 126/58   01/11/19 140/60           Passed - Patient is age 6 or older        Passed - Recent (12 mo) or future (30 days) visit within the authorizing provider's specialty     Patient has had an office visit with the authorizing provider or a provider within the authorizing providers department within the previous 12 mos or has a future within next 30 days. See \"Patient Info\" tab in inbasket, or \"Choose Columns\" in Meds & Orders section of the refill encounter.          Passed - Medication is active on med list      Justyna Weaver RN   "

## 2020-01-21 NOTE — TELEPHONE ENCOUNTER
Prescription approved per [facility] Refill Protocol.    CHICO EspinalN, RN  [facility]

## 2020-01-22 ENCOUNTER — TELEPHONE (OUTPATIENT)
Dept: FAMILY MEDICINE | Facility: OTHER | Age: 80
End: 2020-01-22

## 2020-01-22 DIAGNOSIS — N18.2 TYPE 2 DIABETES MELLITUS WITH STAGE 2 CHRONIC KIDNEY DISEASE, WITH LONG-TERM CURRENT USE OF INSULIN (H): Primary | ICD-10-CM

## 2020-01-22 DIAGNOSIS — Z79.4 TYPE 2 DIABETES MELLITUS WITH STAGE 2 CHRONIC KIDNEY DISEASE, WITH LONG-TERM CURRENT USE OF INSULIN (H): Primary | ICD-10-CM

## 2020-01-22 DIAGNOSIS — E11.22 TYPE 2 DIABETES MELLITUS WITH STAGE 2 CHRONIC KIDNEY DISEASE, WITH LONG-TERM CURRENT USE OF INSULIN (H): Primary | ICD-10-CM

## 2020-01-22 RX ORDER — INSULIN LISPRO 100 [IU]/ML
INJECTION, SOLUTION INTRAVENOUS; SUBCUTANEOUS
Qty: 30 ML | Refills: 2 | Status: SHIPPED | OUTPATIENT
Start: 2020-01-22 | End: 2020-07-31

## 2020-01-22 NOTE — TELEPHONE ENCOUNTER
See 1/22 telephone encounter.    Mirta Carlton, PharmD  Medication Therapy Management Pharmacist  Pager: [phone]

## 2020-01-22 NOTE — TELEPHONE ENCOUNTER
I called pt to discuss options to help with insulin cost. Left voicemail to call back.    Mirta Carlton, PharmD  Medication Therapy Management Pharmacist  Pager: [phone]

## 2020-01-22 NOTE — TELEPHONE ENCOUNTER
I called [facility]'s pharmacy to check on cost. Humalog copay is $381.46 and Lantus is too soon to fill. I called and informed the patient. His insurance had quoted him $47, however I'm guessing it's more expensive until the deductible is met. Discussed that vials/syringes would be cheaper, however pt would prefer pens. Pt is going to call the insurance again and will let me know if there's anything else I can do to help.     Mirta Carlton, PharmD  Medication Therapy Management Pharmacist  Pager: [phone]

## 2020-01-22 NOTE — TELEPHONE ENCOUNTER
I spoke to patient. Basaglar and Novolog are tier 4, whereas Lantus and Humalog are tier 3 on his insurance formulary. I verified current insulin doses are basal 35 units daily and meal-time 16 units before breakfast, 18 units before lunch, 18 units before dinner plus sliding scale as directed. I sent orders to [facility] pharmacy per CPA with Dr. Burleson.     Mirta Carlton, PharmD  Medication Therapy Management Pharmacist  Pager: [phone]

## 2020-01-22 NOTE — TELEPHONE ENCOUNTER
Reason for Call:  Medication or medication refill:    Do you use a [facility]?  Name of the pharmacy and phone number for the current request:  [facility] - [phone]    Name of the medication requested: substitution for novolog & basaglar     Other request: Pj's insurance sent him a letter telling him he needs to switch to a different medication, he went to the pharmacy and the pharmacist told him he needs to go to his doctor to get a medication change. I have taken a copy of the letter and medication list the patient had and put it in your mailbox. Gurvinder is aware Dr Burleson is out of clinic until 1/23/20.    Can we leave a detailed message on this number? YES    Phone number patient can be reached at: Home number on file [phone] (home)    Best Time:     Call taken on 1/22/2020 at 9:58 AM by Madeleine Quinones

## 2020-02-24 ENCOUNTER — TRANSFERRED RECORDS (OUTPATIENT)
Dept: HEALTH INFORMATION MANAGEMENT | Facility: CLINIC | Age: 80
End: 2020-02-24

## 2020-03-16 ENCOUNTER — TELEPHONE (OUTPATIENT)
Dept: PHARMACY | Facility: OTHER | Age: 80
End: 2020-03-16

## 2020-03-16 NOTE — TELEPHONE ENCOUNTER
I called pt to help with insulin refills. Left voicemail for call back.    Mirta Carlton, PharmD  Medication Therapy Management Pharmacist  Pager: [phone]

## 2020-03-17 NOTE — TELEPHONE ENCOUNTER
I spoke to pt and his pharmacy today. Preferred insulins on his insurance formulary are Lantus and brand Humalog. Pharmacy already has prescriptions on file. Copays may be higher than expected if pt has a deductible. I advised pt to call his insurance for more information about his deductible. Pt can call me back if he has other questions.    Mirta Carlton, PharmD  Medication Therapy Management Pharmacist  Pager: [phone]

## 2020-04-20 ENCOUNTER — TRANSFERRED RECORDS (OUTPATIENT)
Dept: HEALTH INFORMATION MANAGEMENT | Facility: CLINIC | Age: 80
End: 2020-04-20

## 2020-05-19 DIAGNOSIS — E11.22 TYPE 2 DIABETES MELLITUS WITH STAGE 2 CHRONIC KIDNEY DISEASE, WITHOUT LONG-TERM CURRENT USE OF INSULIN (H): ICD-10-CM

## 2020-05-19 DIAGNOSIS — N18.2 TYPE 2 DIABETES MELLITUS WITH STAGE 2 CHRONIC KIDNEY DISEASE, WITHOUT LONG-TERM CURRENT USE OF INSULIN (H): ICD-10-CM

## 2020-05-20 RX ORDER — LISINOPRIL 20 MG/1
TABLET ORAL
Qty: 60 TABLET | Refills: 5 | Status: SHIPPED | OUTPATIENT
Start: 2020-05-20 | End: 2020-10-30

## 2020-05-20 NOTE — TELEPHONE ENCOUNTER
Routing refill request to provider for review/approval because:  Labs not current:  James MORFIN  Last Written Prescription Date:  10/18/19  Last Fill Quantity: 60,  # refills: 5   Last office visit: 3/16/2020 with prescribing provider:     Future Office Visit:    CHICO AndreN, RN

## 2020-05-29 DIAGNOSIS — E11.65 TYPE 2 DIABETES MELLITUS WITH HYPERGLYCEMIA (H): ICD-10-CM

## 2020-05-29 RX ORDER — BLOOD SUGAR DIAGNOSTIC
STRIP MISCELLANEOUS
Qty: 100 EACH | Refills: 3 | Status: SHIPPED | OUTPATIENT
Start: 2020-05-29 | End: 2020-11-27

## 2020-07-20 DIAGNOSIS — E11.8 TYPE 2 DIABETES MELLITUS WITH COMPLICATION (H): ICD-10-CM

## 2020-07-20 DIAGNOSIS — I10 HYPERTENSION GOAL BP (BLOOD PRESSURE) < 140/90: ICD-10-CM

## 2020-07-21 RX ORDER — METOPROLOL TARTRATE 50 MG
TABLET ORAL
Qty: 180 TABLET | Refills: 0 | Status: SHIPPED | OUTPATIENT
Start: 2020-07-21 | End: 2020-10-19

## 2020-07-21 RX ORDER — PEN NEEDLE, DIABETIC 31 GX5/16"
NEEDLE, DISPOSABLE MISCELLANEOUS
Qty: 100 EACH | Refills: 0 | Status: SHIPPED | OUTPATIENT
Start: 2020-07-21 | End: 2020-11-27

## 2020-07-21 NOTE — TELEPHONE ENCOUNTER
Prescription approved per [facility] Refill Protocol.    CHICO EspinalN, RN  [facility]

## 2020-07-31 DIAGNOSIS — Z79.4 TYPE 2 DIABETES MELLITUS WITH STAGE 2 CHRONIC KIDNEY DISEASE, WITH LONG-TERM CURRENT USE OF INSULIN (H): ICD-10-CM

## 2020-07-31 DIAGNOSIS — E11.22 TYPE 2 DIABETES MELLITUS WITH STAGE 2 CHRONIC KIDNEY DISEASE, WITH LONG-TERM CURRENT USE OF INSULIN (H): ICD-10-CM

## 2020-07-31 DIAGNOSIS — N18.2 TYPE 2 DIABETES MELLITUS WITH STAGE 2 CHRONIC KIDNEY DISEASE, WITH LONG-TERM CURRENT USE OF INSULIN (H): ICD-10-CM

## 2020-07-31 RX ORDER — INSULIN LISPRO 100 [IU]/ML
INJECTION, SOLUTION INTRAVENOUS; SUBCUTANEOUS
Qty: 30 ML | Refills: 1 | Status: SHIPPED | OUTPATIENT
Start: 2020-07-31 | End: 2020-10-30

## 2020-07-31 NOTE — TELEPHONE ENCOUNTER
Routing refill request to provider for review/approval because:  Labs not current:  Creatinine, A1C    THOMAS Espinal, RN  [facility]

## 2020-10-19 DIAGNOSIS — E11.22 TYPE 2 DIABETES MELLITUS WITH STAGE 2 CHRONIC KIDNEY DISEASE, WITHOUT LONG-TERM CURRENT USE OF INSULIN (H): ICD-10-CM

## 2020-10-19 DIAGNOSIS — N18.2 TYPE 2 DIABETES MELLITUS WITH STAGE 2 CHRONIC KIDNEY DISEASE, WITHOUT LONG-TERM CURRENT USE OF INSULIN (H): ICD-10-CM

## 2020-10-19 DIAGNOSIS — E78.5 HYPERLIPIDEMIA LDL GOAL <100: ICD-10-CM

## 2020-10-19 DIAGNOSIS — R60.1 GENERALIZED EDEMA: ICD-10-CM

## 2020-10-19 DIAGNOSIS — I10 HYPERTENSION GOAL BP (BLOOD PRESSURE) < 140/90: ICD-10-CM

## 2020-10-19 RX ORDER — AMLODIPINE BESYLATE 5 MG/1
TABLET ORAL
Qty: 90 TABLET | Refills: 0 | Status: SHIPPED | OUTPATIENT
Start: 2020-10-19 | End: 2020-10-30 | Stop reason: SINTOL

## 2020-10-19 RX ORDER — FUROSEMIDE 20 MG
TABLET ORAL
Qty: 90 TABLET | Refills: 0 | Status: SHIPPED | OUTPATIENT
Start: 2020-10-19 | End: 2021-01-15

## 2020-10-19 RX ORDER — POTASSIUM CHLORIDE 750 MG/1
TABLET, EXTENDED RELEASE ORAL
Qty: 90 TABLET | Refills: 0 | Status: SHIPPED | OUTPATIENT
Start: 2020-10-19 | End: 2020-10-30

## 2020-10-19 RX ORDER — METOPROLOL TARTRATE 50 MG
TABLET ORAL
Qty: 180 TABLET | Refills: 0 | Status: SHIPPED | OUTPATIENT
Start: 2020-10-19 | End: 2020-10-30

## 2020-10-19 NOTE — TELEPHONE ENCOUNTER
Medication is being filled for 1 time refill only due to:  Patient needs to be seen because it has been more than one year since last visit.     Routing to schedulers to set up face to face appointment for patient for physical.  Patient has been given #90 to get to appointment per triage protocol.    Routing refill request to provider for review/approval because:  Drug not on the [facility] refill protocol (Gabapentin)  Drug interaction warning    CHICO EspinalN, RN  [facility]

## 2020-10-20 RX ORDER — ATORVASTATIN CALCIUM 40 MG/1
TABLET, FILM COATED ORAL
Qty: 90 TABLET | Refills: 0 | Status: SHIPPED | OUTPATIENT
Start: 2020-10-20 | End: 2020-10-30

## 2020-10-20 RX ORDER — GABAPENTIN 300 MG/1
CAPSULE ORAL
Qty: 180 CAPSULE | Refills: 0 | Status: SHIPPED | OUTPATIENT
Start: 2020-10-20 | End: 2020-10-30

## 2020-10-22 DIAGNOSIS — E78.5 HYPERLIPIDEMIA LDL GOAL <100: ICD-10-CM

## 2020-10-22 DIAGNOSIS — N18.2 TYPE 2 DIABETES MELLITUS WITH STAGE 2 CHRONIC KIDNEY DISEASE, WITHOUT LONG-TERM CURRENT USE OF INSULIN (H): ICD-10-CM

## 2020-10-22 DIAGNOSIS — E11.22 TYPE 2 DIABETES MELLITUS WITH STAGE 2 CHRONIC KIDNEY DISEASE, WITHOUT LONG-TERM CURRENT USE OF INSULIN (H): ICD-10-CM

## 2020-10-23 RX ORDER — ATORVASTATIN CALCIUM 40 MG/1
TABLET, FILM COATED ORAL
Qty: 90 TABLET | Refills: 3 | OUTPATIENT
Start: 2020-10-23

## 2020-10-23 RX ORDER — GABAPENTIN 300 MG/1
CAPSULE ORAL
Qty: 180 CAPSULE | Refills: 3 | OUTPATIENT
Start: 2020-10-23

## 2020-10-23 NOTE — TELEPHONE ENCOUNTER
Prescription (Gabapentin) was sent 10/20/2020 for #180 with 0 refills.  Prescription (Lipitor) was sent 10/20/2020 for #90 with 0 refills.  Pharmacy notified via E-Prescribe refusal.     CHICO EspinalN, RN  [facility]

## 2020-10-30 ENCOUNTER — OFFICE VISIT (OUTPATIENT)
Dept: INTERNAL MEDICINE | Facility: CLINIC | Age: 80
End: 2020-10-30
Payer: COMMERCIAL

## 2020-10-30 VITALS
RESPIRATION RATE: 18 BRPM | HEIGHT: 69 IN | OXYGEN SATURATION: 98 % | HEART RATE: 67 BPM | DIASTOLIC BLOOD PRESSURE: 78 MMHG | WEIGHT: 225 LBS | SYSTOLIC BLOOD PRESSURE: 130 MMHG | TEMPERATURE: 97.6 F | BODY MASS INDEX: 33.33 KG/M2

## 2020-10-30 DIAGNOSIS — Z00.00 MEDICARE ANNUAL WELLNESS VISIT, SUBSEQUENT: Primary | ICD-10-CM

## 2020-10-30 DIAGNOSIS — L57.0 AK (ACTINIC KERATOSIS): ICD-10-CM

## 2020-10-30 DIAGNOSIS — I10 HYPERTENSION GOAL BP (BLOOD PRESSURE) < 140/90: ICD-10-CM

## 2020-10-30 DIAGNOSIS — R60.1 GENERALIZED EDEMA: ICD-10-CM

## 2020-10-30 DIAGNOSIS — E11.22 TYPE 2 DIABETES MELLITUS WITH STAGE 2 CHRONIC KIDNEY DISEASE, WITH LONG-TERM CURRENT USE OF INSULIN (H): ICD-10-CM

## 2020-10-30 DIAGNOSIS — E78.5 HYPERLIPIDEMIA LDL GOAL <100: ICD-10-CM

## 2020-10-30 DIAGNOSIS — E11.22 TYPE 2 DIABETES MELLITUS WITH STAGE 2 CHRONIC KIDNEY DISEASE, WITHOUT LONG-TERM CURRENT USE OF INSULIN (H): ICD-10-CM

## 2020-10-30 DIAGNOSIS — N18.2 TYPE 2 DIABETES MELLITUS WITH STAGE 2 CHRONIC KIDNEY DISEASE, WITHOUT LONG-TERM CURRENT USE OF INSULIN (H): ICD-10-CM

## 2020-10-30 DIAGNOSIS — L40.9 PSORIASIS: ICD-10-CM

## 2020-10-30 DIAGNOSIS — Z79.4 TYPE 2 DIABETES MELLITUS WITH STAGE 2 CHRONIC KIDNEY DISEASE, WITH LONG-TERM CURRENT USE OF INSULIN (H): ICD-10-CM

## 2020-10-30 DIAGNOSIS — N18.2 TYPE 2 DIABETES MELLITUS WITH STAGE 2 CHRONIC KIDNEY DISEASE, WITH LONG-TERM CURRENT USE OF INSULIN (H): ICD-10-CM

## 2020-10-30 LAB
ANION GAP SERPL CALCULATED.3IONS-SCNC: 5 MMOL/L (ref 3–14)
BUN SERPL-MCNC: 30 MG/DL (ref 7–30)
CALCIUM SERPL-MCNC: 9.1 MG/DL (ref 8.5–10.1)
CHLORIDE SERPL-SCNC: 109 MMOL/L (ref 94–109)
CHOLEST SERPL-MCNC: 104 MG/DL
CO2 SERPL-SCNC: 27 MMOL/L (ref 20–32)
CREAT SERPL-MCNC: 1.42 MG/DL (ref 0.66–1.25)
CREAT UR-MCNC: 130 MG/DL
GFR SERPL CREATININE-BSD FRML MDRD: 46 ML/MIN/{1.73_M2}
GLUCOSE SERPL-MCNC: 130 MG/DL (ref 70–99)
HBA1C MFR BLD: 6.3 % (ref 0–5.6)
HDLC SERPL-MCNC: 32 MG/DL
LDLC SERPL CALC-MCNC: 50 MG/DL
MICROALBUMIN UR-MCNC: 42 MG/L
MICROALBUMIN/CREAT UR: 32.31 MG/G CR (ref 0–17)
NONHDLC SERPL-MCNC: 76 MG/DL
POTASSIUM SERPL-SCNC: 4.8 MMOL/L (ref 3.4–5.3)
SODIUM SERPL-SCNC: 141 MMOL/L (ref 133–144)
TRIGL SERPL-MCNC: 128 MG/DL

## 2020-10-30 PROCEDURE — 99207 PR FOOT EXAM NO CHARGE: CPT | Mod: 25 | Performed by: INTERNAL MEDICINE

## 2020-10-30 PROCEDURE — 83036 HEMOGLOBIN GLYCOSYLATED A1C: CPT | Performed by: INTERNAL MEDICINE

## 2020-10-30 PROCEDURE — 82043 UR ALBUMIN QUANTITATIVE: CPT | Performed by: INTERNAL MEDICINE

## 2020-10-30 PROCEDURE — 99213 OFFICE O/P EST LOW 20 MIN: CPT | Mod: 25 | Performed by: INTERNAL MEDICINE

## 2020-10-30 PROCEDURE — 36415 COLL VENOUS BLD VENIPUNCTURE: CPT | Performed by: INTERNAL MEDICINE

## 2020-10-30 PROCEDURE — 80061 LIPID PANEL: CPT | Performed by: INTERNAL MEDICINE

## 2020-10-30 PROCEDURE — 99397 PER PM REEVAL EST PAT 65+ YR: CPT | Performed by: INTERNAL MEDICINE

## 2020-10-30 PROCEDURE — 17000 DESTRUCT PREMALG LESION: CPT | Performed by: INTERNAL MEDICINE

## 2020-10-30 PROCEDURE — 80048 BASIC METABOLIC PNL TOTAL CA: CPT | Performed by: INTERNAL MEDICINE

## 2020-10-30 RX ORDER — AMLODIPINE BESYLATE 5 MG/1
5 TABLET ORAL DAILY
Qty: 90 TABLET | Refills: 1 | Status: SHIPPED | OUTPATIENT
Start: 2020-10-30 | End: 2022-09-16

## 2020-10-30 RX ORDER — HALOBETASOL PROPIONATE 0.5 MG/G
CREAM TOPICAL 2 TIMES DAILY
Qty: 50 G | Refills: 0 | Status: SHIPPED | OUTPATIENT
Start: 2020-10-30 | End: 2022-09-16

## 2020-10-30 RX ORDER — LISINOPRIL 20 MG/1
20 TABLET ORAL 2 TIMES DAILY
Qty: 180 TABLET | Refills: 1 | Status: SHIPPED | OUTPATIENT
Start: 2020-10-30 | End: 2021-05-20

## 2020-10-30 RX ORDER — POTASSIUM CHLORIDE 750 MG/1
10 TABLET, EXTENDED RELEASE ORAL DAILY
Qty: 90 TABLET | Refills: 1 | Status: SHIPPED | OUTPATIENT
Start: 2020-10-30 | End: 2021-07-23

## 2020-10-30 RX ORDER — METOPROLOL TARTRATE 50 MG
50 TABLET ORAL 2 TIMES DAILY
Qty: 180 TABLET | Refills: 1 | Status: SHIPPED | OUTPATIENT
Start: 2020-10-30 | End: 2021-07-16

## 2020-10-30 RX ORDER — INSULIN LISPRO 100 [IU]/ML
INJECTION, SOLUTION INTRAVENOUS; SUBCUTANEOUS
Qty: 30 ML | Refills: 1 | Status: SHIPPED | OUTPATIENT
Start: 2020-10-30 | End: 2020-12-01

## 2020-10-30 RX ORDER — ATORVASTATIN CALCIUM 40 MG/1
40 TABLET, FILM COATED ORAL DAILY
Qty: 90 TABLET | Refills: 1 | Status: SHIPPED | OUTPATIENT
Start: 2020-10-30 | End: 2021-05-05

## 2020-10-30 RX ORDER — GABAPENTIN 300 MG/1
CAPSULE ORAL
Qty: 180 CAPSULE | Refills: 1 | Status: SHIPPED | OUTPATIENT
Start: 2020-10-30 | End: 2021-05-05

## 2020-10-30 ASSESSMENT — PAIN SCALES - GENERAL: PAINLEVEL: NO PAIN (0)

## 2020-10-30 ASSESSMENT — MIFFLIN-ST. JEOR: SCORE: 1715.97

## 2020-10-30 ASSESSMENT — ACTIVITIES OF DAILY LIVING (ADL): CURRENT_FUNCTION: NO ASSISTANCE NEEDED

## 2020-10-30 NOTE — PROGRESS NOTES
"SUBJECTIVE:   Pj Zhao is a 79 year old male who presents for Preventive Visit.      Patient has been advised of split billing requirements and indicates understanding: Yes   Are you in the first 12 months of your Medicare coverage?  No    Healthy Habits:     In general, how would you rate your overall health?  Good    Frequency of exercise:  6-7 days/week    Duration of exercise:  45-60 minutes    Do you usually eat at least 4 servings of fruit and vegetables a day, include whole grains    & fiber and avoid regularly eating high fat or \"junk\" foods?  Yes    Taking medications regularly:  Yes    Barriers to taking medications:  None    Medication side effects:  None    Ability to successfully perform activities of daily living:  No assistance needed    Home Safety:  No safety concerns identified    Hearing Impairment:  No hearing concerns    In the past 6 months, have you been bothered by leaking of urine?  No    In general, how would you rate your overall mental or emotional health?  Good      PHQ-2 Total Score: 0    Additional concerns today:  No    Do you feel safe in your environment? Yes    Have you ever done Advance Care Planning? (For example, a Health Directive, POLST, or a discussion with a medical provider or your loved ones about your wishes): No, advance care planning information given to patient to review.  Advanced care planning was discussed at today's visit.    Patient with multiple skin concerns today as well on ROS. He does have multiple AK over his face and reports itching at times and doesn't like how they look. He did follow with dermatology previously but did not want to follow up with them. They have done biopsy in the past and negative per patient. He also reports that they discussed biopsy of his ear in the future if things changed. But they have not and he did not want it biopsied. No other hx of skin cancers. Does sound like they may have gave him field therapy for multiple AK's but " he did not want to do so. Also has rash at his waste line that has been there for many years and resistant to multiple topical therapies he has tried in the past. Reports dermatology biopsying it priously and told him it was not cancerous.          Fall risk  Fallen 2 or more times in the past year?: No  Any fall with injury in the past year?: No    Cognitive Screening   1) Repeat 3 items (Leader, Season, Table)    2) Clock draw: NORMAL  3) 3 item recall: Recalls 3 objects  Results: 3 items recalled: COGNITIVE IMPAIRMENT LESS LIKELY    Mini-CogTM Copyright S Alicia. Licensed by the author for use in [facility]; reprinted with permission (elaine@[facility]). All rights reserved.          Reviewed and updated as needed this visit by clinical staff  Tobacco  Allergies  Meds   Med Hx  Surg Hx  Fam Hx  Soc Hx        Reviewed and updated as needed this visit by Provider                Social History     Tobacco Use     Smoking status: Never Smoker     Smokeless tobacco: Never Used   Substance Use Topics     Alcohol use: No     Alcohol/week: 0.0 standard drinks         Alcohol Use 10/18/2019   Prescreen: >3 drinks/day or >7 drinks/week? No         Current providers sharing in care for this patient include:   Patient Care Team:  Porfirio Burleson DO as PCP - General (Internal Medicine)  Porfirio Burleson DO as Assigned PCP  Mirta Carlton RPH as Pharmacist (Pharmacist)    The following health maintenance items are reviewed in Epic and correct as of today:  Health Maintenance   Topic Date Due     HEPATITIS B IMMUNIZATION (1 of 3 - Risk 3-dose series) 11/10/1959     COLORECTAL CANCER SCREENING  06/28/2016     DIABETIC FOOT EXAM  10/12/2019     ADVANCE CARE PLANNING  11/27/2019     FALL RISK ASSESSMENT  10/18/2020     EYE EXAM  04/20/2021     A1C  04/30/2021     MEDICARE ANNUAL WELLNESS VISIT  10/30/2021     BMP  10/30/2021     LIPID  10/30/2021     MICROALBUMIN  10/30/2021      "DTAP/TDAP/TD IMMUNIZATION (3 - Td) 12/16/2024     PHQ-2  Completed     INFLUENZA VACCINE  Completed     Pneumococcal Vaccine: 65+ Years  Completed     ZOSTER IMMUNIZATION  Completed     Pneumococcal Vaccine: Pediatrics (0 to 5 Years) and At-Risk Patients (6 to 64 Years)  Aged Out     IPV IMMUNIZATION  Aged Out     MENINGITIS IMMUNIZATION  Aged Out       Review of Systems  CONSTITUTIONAL: NEGATIVE for fever, chills, change in weight  INTEGUMENTARY/SKIN: spots on face that itch some times, reports them flaking off sometimes. Rash by belt  EYES: NEGATIVE for vision changes or irritation  ENT/MOUTH: NEGATIVE for ear, mouth and throat problems  RESP: NEGATIVE for significant cough or SOB  BREAST: NEGATIVE for masses, tenderness or discharge  CV: NEGATIVE for chest pain, palpitations. Does have LE edema  GI: NEGATIVE for nausea, abdominal pain, heartburn, or change in bowel habits  : NEGATIVE for frequency, dysuria, or hematuria  MUSCULOSKELETAL: NEGATIVE for significant arthralgias or myalgia  NEURO: NEGATIVE for weakness, dizziness or paresthesias  PSYCHIATRIC: NEGATIVE for changes in mood or affect    OBJECTIVE:   /78 (BP Location: Right arm, Patient Position: Sitting, Cuff Size: Adult Regular)   Pulse 67   Temp 97.6  F (36.4  C) (Temporal)   Resp 18   Ht 1.753 m (5' 9\")   Wt 102.1 kg (225 lb)   SpO2 98%   BMI 33.23 kg/m   Estimated body mass index is 33.23 kg/m  as calculated from the following:    Height as of this encounter: 1.753 m (5' 9\").    Weight as of this encounter: 102.1 kg (225 lb).  Physical Exam  GENERAL: healthy, alert and no distress  EYES: Eyes grossly normal to inspection, PERRL and conjunctivae and sclerae normal  HENT: ear canals and TM's normal, nose and mouth without ulcers or lesions  NECK: no adenopathy, no asymmetry, masses, or scars and thyroid normal to palpation  RESP: lungs clear to auscultation - no rales, rhonchi or wheezes  CV: regular rate and rhythm, normal S1 S2, no S3 " or S4, no murmur, click or rub, 1 + bilateral LE edema  ABDOMEN: soft, nontender, no hepatosplenomegaly, no masses and bowel sounds normal  MS: no gross musculoskeletal defects noted, no edema  SKIN: Diffuse rough and lightly erythemaout patches over his face and neck, one large patch on his right cheek measuring 1.5 cm, other major patches on right temple and left ear. Left lower abdomen belt line pink scaling patch roughly 4 x 4 cm's.   NEURO: Normal strength and tone, mentation intact and speech normal  PSYCH: mentation appears normal, affect normal/bright  Feet: no evidence of skin breakdown or ulceration is noted.  Sensation is intact to monofilament and vibration.  Pulses are strong, capillary refill is brisk.  ASSESSMENT / PLAN:       ICD-10-CM    1. Medicare annual wellness visit, subsequent  Z00.00    2. Hyperlipidemia LDL goal <100  E78.5 atorvastatin (LIPITOR) 40 MG tablet     Lipid panel reflex to direct LDL Fasting   3. Type 2 diabetes mellitus with stage 2 chronic kidney disease, without long-term current use of insulin (H)  E11.22 gabapentin (NEURONTIN) 300 MG capsule    N18.2 lisinopril (ZESTRIL) 20 MG tablet     metFORMIN (GLUCOPHAGE) 500 MG tablet     Hemoglobin A1c     Basic metabolic panel  (Ca, Cl, CO2, Creat, Gluc, K, Na, BUN)     Albumin Random Urine Quantitative with Creat Ratio   4. Hypertension goal BP (blood pressure) < 140/90  I10 amLODIPine (NORVASC) 5 MG tablet     metoprolol tartrate (LOPRESSOR) 50 MG tablet   5. Generalized edema  R60.1 potassium chloride ER (KLOR-CON M) 10 MEQ CR tablet   6. Type 2 diabetes mellitus with stage 2 chronic kidney disease, with long-term current use of insulin (H)  E11.22 insulin glargine (LANTUS SOLOSTAR) 100 UNIT/ML pen    N18.2 insulin lispro (HUMALOG KWIKPEN) 100 UNIT/ML (1 unit dial) KWIKPEN    Z79.4    7. Psoriasis  L40.9 halobetasol (ULTRAVATE) 0.05 % external cream   8. AK (actinic keratosis)  L57.0      He will continue DM regiment. Will stop  "amlodipine given LE edema and good blood pressure control. He will start taking his blood pressure daily and let us know if increasing. Repeat labs today and plan to continue insulin regiment unless A1c changed from previous. He will think about colonoscopy. He is over 80 but last one was ten years ago and he is an active ti with hx of colon cancer so screening would be reasonable. For his multiple skin concerns I thought it would be best for him to see dermatology again but patient refusing at this time. He had multiple AKs across his face, neck and ears. It does sound like they talked about doing field therapy when he was seen last but patient did not want to do this and never followed up with dermatology. Does have more concerning spot on his left ear that he just wants frozen today. Reasonable since this is likely an AK.  I did do cryotherapy and plan to see him back in 4 months. If spots still present then I did instruct him that we would have to biopsy or have him see dermatology and he was in agreement. We will also readdress his blood pressure and LE edema at that time as well.  Halobetasol given for psoriasis patch and psoriasis patch only.     Follow up in 4 months.     Patient has been advised of split billing requirements and indicates understanding: Yes  COUNSELING:  Reviewed preventive health counseling, as reflected in patient instructions       Regular exercise       Healthy diet/nutrition       Vision screening       Hearing screening       Dental care       Fall risk prevention    Estimated body mass index is 33.23 kg/m  as calculated from the following:    Height as of this encounter: 1.753 m (5' 9\").    Weight as of this encounter: 102.1 kg (225 lb).    Weight management plan: Discussed healthy diet and exercise guidelines    He reports that he has never smoked. He has never used smokeless tobacco.      Appropriate preventive services were discussed with this patient, including applicable " screening as appropriate for cardiovascular disease, diabetes, osteopenia/osteoporosis, and glaucoma.  As appropriate for age/gender, discussed screening for colorectal cancer, prostate cancer, breast cancer, and cervical cancer. Checklist reviewing preventive services available has been given to the patient.    Reviewed patients plan of care and provided an AVS. The Basic Care Plan (routine screening as documented in Health Maintenance) for Pj meets the Care Plan requirement. This Care Plan has been established and reviewed with the Patient.    Counseling Resources:  ATP IV Guidelines  Pooled Cohorts Equation Calculator  Breast Cancer Risk Calculator  Breast Cancer: Medication to Reduce Risk  FRAX Risk Assessment  ICSI Preventive Guidelines  Dietary Guidelines for Americans, 2010  [employer]'s MyPlate  ASA Prophylaxis  Lung CA Screening    Porfirio Burleson DO  [facility]      This patient has been interviewed, examined, diagnosed, and informed of the above by me personally.  Medical records and available pertinent information has been reviewed by me personally.  All decisions and discussion have been between myself and the patient/family.  This was done in the presence of Manny Biggs MD  , who acted as a medical scribe and recorded the events above.  No diagnosis or decision making was made by the above-mentioned scribe.  The patient, and or his/her ensurors will not be billed for the presence or actions of this scribe.  The information recorded by the scribe has been reviewed by me and found to be accurate.

## 2020-11-03 NOTE — RESULT ENCOUNTER NOTE
Dear Pj, your recent test results are attached.  The microalbumin is slightly elevated suggesting a small amount of protein loss through the kidneys.  Cholesterol is well controlled with an LDL of 50.  Chemistry panel shows a slightly elevated blood sugar 130 and a slight decrease in kidney function with a GFR of 46.  The hemoglobin A1c is up slightly from the previous 5.8 a year ago to 6.3.  This is still consistent with very good blood sugar control.    You will be contacted with any outstanding results when they are available.  Feel free to contact me via the office or My Chart if you have any questions regarding the above.  Sincerely,  Porfirio Burleson DO [facility]

## 2020-11-27 DIAGNOSIS — E11.65 TYPE 2 DIABETES MELLITUS WITH HYPERGLYCEMIA (H): ICD-10-CM

## 2020-11-27 DIAGNOSIS — E11.8 TYPE 2 DIABETES MELLITUS WITH COMPLICATION (H): ICD-10-CM

## 2020-11-27 RX ORDER — PEN NEEDLE, DIABETIC 31 GX5/16"
NEEDLE, DISPOSABLE MISCELLANEOUS
Qty: 100 EACH | Refills: 1 | Status: SHIPPED | OUTPATIENT
Start: 2020-11-27 | End: 2021-10-08

## 2020-11-27 RX ORDER — BLOOD SUGAR DIAGNOSTIC
STRIP MISCELLANEOUS
Qty: 100 EACH | Refills: 3 | Status: SHIPPED | OUTPATIENT
Start: 2020-11-27 | End: 2021-06-04

## 2020-11-27 NOTE — TELEPHONE ENCOUNTER
Prescription approved per [facility] Refill Protocol.    CHICO EspinalN, RN  [facility]

## 2020-11-27 NOTE — TELEPHONE ENCOUNTER
Patient seen 10-30-20    Prescription approved per [facility] Refill Protocol.    Karli Helms RN on 11/27/2020 at 4:52 PM

## 2020-11-30 DIAGNOSIS — Z79.4 TYPE 2 DIABETES MELLITUS WITH STAGE 2 CHRONIC KIDNEY DISEASE, WITH LONG-TERM CURRENT USE OF INSULIN (H): ICD-10-CM

## 2020-11-30 DIAGNOSIS — E11.22 TYPE 2 DIABETES MELLITUS WITH STAGE 2 CHRONIC KIDNEY DISEASE, WITH LONG-TERM CURRENT USE OF INSULIN (H): ICD-10-CM

## 2020-11-30 DIAGNOSIS — N18.2 TYPE 2 DIABETES MELLITUS WITH STAGE 2 CHRONIC KIDNEY DISEASE, WITH LONG-TERM CURRENT USE OF INSULIN (H): ICD-10-CM

## 2020-12-01 RX ORDER — INSULIN LISPRO 100 [IU]/ML
INJECTION, SOLUTION INTRAVENOUS; SUBCUTANEOUS
Qty: 30 ML | Refills: 1 | Status: SHIPPED | OUTPATIENT
Start: 2020-12-01 | End: 2021-04-05

## 2020-12-01 NOTE — TELEPHONE ENCOUNTER
Per fax from [facility] please make sure that max number of units patient can use per day included in the RX.     Maci Jauregui MA

## 2020-12-09 ENCOUNTER — TRANSFERRED RECORDS (OUTPATIENT)
Dept: HEALTH INFORMATION MANAGEMENT | Facility: CLINIC | Age: 80
End: 2020-12-09

## 2021-01-15 DIAGNOSIS — N18.2 TYPE 2 DIABETES MELLITUS WITH STAGE 2 CHRONIC KIDNEY DISEASE, WITHOUT LONG-TERM CURRENT USE OF INSULIN (H): ICD-10-CM

## 2021-01-15 DIAGNOSIS — R60.1 GENERALIZED EDEMA: ICD-10-CM

## 2021-01-15 DIAGNOSIS — E11.22 TYPE 2 DIABETES MELLITUS WITH STAGE 2 CHRONIC KIDNEY DISEASE, WITHOUT LONG-TERM CURRENT USE OF INSULIN (H): ICD-10-CM

## 2021-01-15 RX ORDER — NITROGLYCERIN 0.4 MG/1
TABLET SUBLINGUAL
Qty: 25 TABLET | Refills: 0 | Status: SHIPPED | OUTPATIENT
Start: 2021-01-15 | End: 2022-12-22

## 2021-01-15 RX ORDER — FUROSEMIDE 20 MG
TABLET ORAL
Qty: 90 TABLET | Refills: 0 | Status: SHIPPED | OUTPATIENT
Start: 2021-01-15 | End: 2021-04-20

## 2021-01-15 NOTE — TELEPHONE ENCOUNTER
Prescription approved per [facility] Refill Protocol.    Justyna Weaver RN

## 2021-01-15 NOTE — TELEPHONE ENCOUNTER
Routing refill request to provider for review/approval because:  Labs out of range:  Creatinine    Justyna Weaver RN

## 2021-01-26 ENCOUNTER — TELEPHONE (OUTPATIENT)
Dept: INTERNAL MEDICINE | Facility: CLINIC | Age: 81
End: 2021-01-26

## 2021-01-26 NOTE — LETTER
[address] [address]  [phone]        January 26, 2021    Pj Zhao  [address] [place] [address]          Dear Pj,    Your recent test results are attached.  The microalbumin is slightly elevated suggesting a small amount of protein loss through the kidneys.  Cholesterol is well controlled with an LDL of 50.  Chemistry panel shows a slightly elevated blood sugar 130 and a slight decrease in kidney function with a GFR of 46.  The hemoglobin A1c is up slightly from the previous 5.8 a year ago to 6.3.  This is still consistent with very good blood sugar control.     You will be contacted with any outstanding results when they are available.  Feel free to contact me via the office or My Chart if you have any questions regarding the above.      Sincerely,        Porfirio Burleson, DO

## 2021-01-26 NOTE — TELEPHONE ENCOUNTER
Patient never got his lab results    He does not have mychart    Please mail him his results    Call him if there is anything concerning    [phone] ok to leave message

## 2021-03-09 ENCOUNTER — OFFICE VISIT (OUTPATIENT)
Dept: INTERNAL MEDICINE | Facility: CLINIC | Age: 81
End: 2021-03-09
Payer: COMMERCIAL

## 2021-03-09 VITALS
SYSTOLIC BLOOD PRESSURE: 128 MMHG | OXYGEN SATURATION: 97 % | RESPIRATION RATE: 18 BRPM | BODY MASS INDEX: 33.23 KG/M2 | DIASTOLIC BLOOD PRESSURE: 72 MMHG | HEART RATE: 70 BPM | WEIGHT: 225 LBS | TEMPERATURE: 97.6 F

## 2021-03-09 DIAGNOSIS — C18.9 MALIGNANT NEOPLASM OF COLON, UNSPECIFIED PART OF COLON (H): ICD-10-CM

## 2021-03-09 DIAGNOSIS — I73.9 INTERMITTENT CLAUDICATION (H): ICD-10-CM

## 2021-03-09 DIAGNOSIS — N18.2 TYPE 2 DIABETES MELLITUS WITH STAGE 2 CHRONIC KIDNEY DISEASE, WITH LONG-TERM CURRENT USE OF INSULIN (H): ICD-10-CM

## 2021-03-09 DIAGNOSIS — L57.0 SOLAR KERATOSIS: Primary | ICD-10-CM

## 2021-03-09 DIAGNOSIS — E11.22 TYPE 2 DIABETES MELLITUS WITH STAGE 2 CHRONIC KIDNEY DISEASE, WITH LONG-TERM CURRENT USE OF INSULIN (H): ICD-10-CM

## 2021-03-09 DIAGNOSIS — N18.31 STAGE 3A CHRONIC KIDNEY DISEASE (H): ICD-10-CM

## 2021-03-09 DIAGNOSIS — Z79.4 TYPE 2 DIABETES MELLITUS WITH STAGE 2 CHRONIC KIDNEY DISEASE, WITH LONG-TERM CURRENT USE OF INSULIN (H): ICD-10-CM

## 2021-03-09 PROCEDURE — 99214 OFFICE O/P EST MOD 30 MIN: CPT | Mod: 25 | Performed by: INTERNAL MEDICINE

## 2021-03-09 PROCEDURE — 17000 DESTRUCT PREMALG LESION: CPT | Performed by: INTERNAL MEDICINE

## 2021-03-09 ASSESSMENT — PAIN SCALES - GENERAL: PAINLEVEL: NO PAIN (0)

## 2021-03-09 NOTE — PROGRESS NOTES
Cece Oliva is a 80 year old who presents for the following health issues     HPI       Chief Complaint   Patient presents with     Derm Problem     recheck skin spots removed on face        EMR reviewed including:             Complaint, History of Chief Complaint, Corresponding Review of Systems, and Complaint Specific Physical Examination.    #1   Multiple dry areas of skin on his left ear and right ear.  History of previous cryotherapy for actinic keratosis.  Wants these removed.  Notes are occasionally painful.  Sometimes become inflamed.       Exam:   SKIN:  warm and dry. No erythema, or rashes are noted.  Small areas of actinic keratosis noted in the upper left helix and anterior to the right ear is noted.    #2   Follow-up on type 2 diabetes.  Taking insulin without compromise.  Currently on Metformin without diarrhea.  Taking ACE inhibitor, statin, aspirin.  Denies polyuria or polydipsia.  Denies symptoms of hypoglycemia.      #3   history of colon cancer.  Last colonoscopy was 10 years ago.  No symptoms of melena, hematochezia, weight loss etc.  Recommend scheduling soon.      Vital Signs:   /72 (BP Location: Right arm, Patient Position: Sitting, Cuff Size: Adult Large)   Pulse 70   Temp 97.6  F (36.4  C) (Temporal)   Resp 18   Wt 102.1 kg (225 lb)   SpO2 97%   BMI 33.23 kg/m               Decision Making    Problem and Complexity     1. Solar keratosis  Treated with liquid nitrogen via spray technique.    2. Type 2 diabetes mellitus with stage 2 chronic kidney disease, with long-term current use of insulin (H)  Continue current medications.  Recommend fasting lab work at next visit in 4 months    3. Intermittent claudication (H)  Currently stable.  Recommend continuing blood sugar control, statin, blood pressure control.    4. Malignant neoplasm of colon, unspecified part of colon (H)  Recommend colonoscopy.  Patient will consider    5. Stage 3a chronic kidney  disease  Stable.          ------------------------------------------------------------------------------------------------------------------------------  Data    4=1/3 5=2/3    1  >3  Specialty (external) notes reviewed:   None    Tests ordered (by provider) reviewed:   None     Tests ordered (by provider):   None    Independent Historians Interview:   None    2  Review of outside (other providers) Tests:   Previous colonoscopy reviewed    3   Verbal Discussion with Specialists:    None      ----------------------------------------------------------------------------------------------------------------------------------  Risk   Prescription drug management:   Yes, ongoing                                High risk for toxicity:     Decision regarding surgery:    None     Social determinants of health:   None     Decision to withhold therapy:   None                                 FOLLOW UP   I have asked the patient to make an appointment for followup with me in 4 months            I have carefully explained the diagnosis and treatment options to the patient.  The patient has displayed an understanding of the above, and all subsequent questions were answered.      DO CAREN Phillips    Portions of this note were produced using [employer]  Although every attempt at real-time proof reading has been made, occasional grammar/syntax errors may have been missed.

## 2021-04-01 DIAGNOSIS — N18.2 TYPE 2 DIABETES MELLITUS WITH STAGE 2 CHRONIC KIDNEY DISEASE, WITH LONG-TERM CURRENT USE OF INSULIN (H): ICD-10-CM

## 2021-04-01 DIAGNOSIS — E11.22 TYPE 2 DIABETES MELLITUS WITH STAGE 2 CHRONIC KIDNEY DISEASE, WITH LONG-TERM CURRENT USE OF INSULIN (H): ICD-10-CM

## 2021-04-01 DIAGNOSIS — Z79.4 TYPE 2 DIABETES MELLITUS WITH STAGE 2 CHRONIC KIDNEY DISEASE, WITH LONG-TERM CURRENT USE OF INSULIN (H): ICD-10-CM

## 2021-04-05 RX ORDER — INSULIN LISPRO 100 [IU]/ML
INJECTION, SOLUTION INTRAVENOUS; SUBCUTANEOUS
Qty: 30 ML | Refills: 1 | Status: SHIPPED | OUTPATIENT
Start: 2021-04-05 | End: 2021-08-11

## 2021-04-05 NOTE — TELEPHONE ENCOUNTER
Patient saw Dr. Burleson on 3/9/2021.     Prescription approved per [facility] Refill Protocol.    CHICO EspinalN, RN  [facility]

## 2021-04-16 DIAGNOSIS — R60.1 GENERALIZED EDEMA: ICD-10-CM

## 2021-04-20 RX ORDER — FUROSEMIDE 20 MG
TABLET ORAL
Qty: 90 TABLET | Refills: 0 | Status: SHIPPED | OUTPATIENT
Start: 2021-04-20 | End: 2021-07-16

## 2021-04-20 NOTE — TELEPHONE ENCOUNTER
"Lasix  Last Written Prescription Date:  1/15/21  Last Fill Quantity: 90,  # refills: 0   Last office visit: 3/16/2020 with prescribing provider:  Dr. Obregon   Future Office Visit:  NONE  Requested Prescriptions   Pending Prescriptions Disp Refills     furosemide (LASIX) 20 MG tablet [Pharmacy Med Name: FUROSEMIDE 20MG TABS] 90 tablet 0     Sig: TAKE ONE TABLET BY MOUTH ONCE DAILY       Diuretics (Including Combos) Protocol Failed - 4/16/2021  8:36 AM        Failed - Normal serum creatinine on file in past 12 months     Recent Labs   Lab Test 10/30/20  0858   CR 1.42*      Component      Latest Ref Rng & Units 5/9/2018 1/22/2019 10/30/2020   Creatinine      0.66 - 1.25 mg/dL 1.11 1.25 1.42 (H)   GFR Estimate      >60 mL/min/1.73:m2 >60 54 (L) 46 (L)   GFR Estimate If Black      >60 mL/min/1.73:m2 >60 63 53 (L)           Passed - Blood pressure under 140/90 in past 12 months     BP Readings from Last 3 Encounters:   03/09/21 128/72   10/30/20 130/78   10/18/19 132/80           Passed - Recent (12 mo) or future (30 days) visit within the authorizing provider's specialty     Patient has had an office visit with the authorizing provider or a provider within the authorizing providers department within the previous 12 mos or has a future within next 30 days. See \"Patient Info\" tab in inbasket, or \"Choose Columns\" in Meds & Orders section of the refill encounter.            Passed - Medication is active on med list        Passed - Patient is age 18 or older        Passed - Normal serum potassium on file in past 12 months     Recent Labs   Lab Test 10/30/20  0858   POTASSIUM 4.8              Passed - Normal serum sodium on file in past 12 months     Recent Labs   Lab Test 10/30/20  0858                  Routing refill request to provider for review/approval because:  Labs out of range:  See Cr+ above  MADDY Bright                    "

## 2021-05-04 DIAGNOSIS — N18.2 TYPE 2 DIABETES MELLITUS WITH STAGE 2 CHRONIC KIDNEY DISEASE, WITHOUT LONG-TERM CURRENT USE OF INSULIN (H): ICD-10-CM

## 2021-05-04 DIAGNOSIS — E78.5 HYPERLIPIDEMIA LDL GOAL <100: ICD-10-CM

## 2021-05-04 DIAGNOSIS — E11.22 TYPE 2 DIABETES MELLITUS WITH STAGE 2 CHRONIC KIDNEY DISEASE, WITHOUT LONG-TERM CURRENT USE OF INSULIN (H): ICD-10-CM

## 2021-05-05 RX ORDER — GABAPENTIN 300 MG/1
CAPSULE ORAL
Qty: 180 CAPSULE | Refills: 1 | Status: SHIPPED | OUTPATIENT
Start: 2021-05-05 | End: 2021-10-13

## 2021-05-05 RX ORDER — ATORVASTATIN CALCIUM 40 MG/1
TABLET, FILM COATED ORAL
Qty: 90 TABLET | Refills: 1 | Status: SHIPPED | OUTPATIENT
Start: 2021-05-05 | End: 2021-11-04

## 2021-05-05 NOTE — TELEPHONE ENCOUNTER
Gabapentin      Last Written Prescription Date:  10/30/2020  Last Fill Quantity: 180,   # refills: 1  Last Office Visit: 3/9/2021  Future Office visit:       Routing refill request to provider for review/approval because:  Drug not on the G, P or [facility] refill protocol or controlled substance

## 2021-05-19 DIAGNOSIS — E11.22 TYPE 2 DIABETES MELLITUS WITH STAGE 2 CHRONIC KIDNEY DISEASE, WITHOUT LONG-TERM CURRENT USE OF INSULIN (H): ICD-10-CM

## 2021-05-19 DIAGNOSIS — N18.2 TYPE 2 DIABETES MELLITUS WITH STAGE 2 CHRONIC KIDNEY DISEASE, WITHOUT LONG-TERM CURRENT USE OF INSULIN (H): ICD-10-CM

## 2021-05-20 RX ORDER — LISINOPRIL 20 MG/1
TABLET ORAL
Qty: 180 TABLET | Refills: 1 | Status: SHIPPED | OUTPATIENT
Start: 2021-05-20 | End: 2021-11-18

## 2021-05-20 NOTE — TELEPHONE ENCOUNTER
Routing refill request to provider for review/approval because:  Labs out of range:  Creatinine    CHICO EspinalN, RN  [facility]

## 2021-06-03 DIAGNOSIS — E11.65 TYPE 2 DIABETES MELLITUS WITH HYPERGLYCEMIA (H): ICD-10-CM

## 2021-06-04 RX ORDER — BLOOD SUGAR DIAGNOSTIC
STRIP MISCELLANEOUS
Qty: 100 STRIP | Refills: 5 | Status: SHIPPED | OUTPATIENT
Start: 2021-06-04 | End: 2022-02-04

## 2021-06-04 NOTE — TELEPHONE ENCOUNTER
"  Requested Prescriptions   Pending Prescriptions Disp Refills     ONETOUCH ULTRA test strip [Pharmacy Med Name: ONETOUCH ULTRA  STRP] 100 strip 3     Sig: USE TO TEST FOUR TIMES A DAY   3/9/2021    Diabetic Supplies Protocol Passed - 6/3/2021  8:36 AM        Passed - Medication is active on med list        Passed - Patient is 18 years of age or older        Passed - Recent (6 mo) or future (30 days) visit within the authorizing provider's specialty     Patient had office visit in the last 6 months or has a visit in the next 30 days with authorizing provider.  See \"Patient Info\" tab in inbasket, or \"Choose Columns\" in Meds & Orders section of the refill encounter.             Prescription approved per [facility] Refill Protocol.  Haylee Garcia RN    "

## 2021-06-18 ENCOUNTER — OFFICE VISIT (OUTPATIENT)
Dept: INTERNAL MEDICINE | Facility: CLINIC | Age: 81
End: 2021-06-18
Payer: COMMERCIAL

## 2021-06-18 VITALS
BODY MASS INDEX: 32.34 KG/M2 | SYSTOLIC BLOOD PRESSURE: 122 MMHG | HEART RATE: 56 BPM | WEIGHT: 219 LBS | OXYGEN SATURATION: 100 % | RESPIRATION RATE: 18 BRPM | DIASTOLIC BLOOD PRESSURE: 68 MMHG | TEMPERATURE: 96.3 F

## 2021-06-18 DIAGNOSIS — Z79.4 TYPE 2 DIABETES MELLITUS WITH HYPERGLYCEMIA, WITH LONG-TERM CURRENT USE OF INSULIN (H): Primary | ICD-10-CM

## 2021-06-18 DIAGNOSIS — E11.65 TYPE 2 DIABETES MELLITUS WITH HYPERGLYCEMIA, WITH LONG-TERM CURRENT USE OF INSULIN (H): Primary | ICD-10-CM

## 2021-06-18 DIAGNOSIS — L03.031 CELLULITIS OF TOE OF RIGHT FOOT: ICD-10-CM

## 2021-06-18 LAB
ANION GAP SERPL CALCULATED.3IONS-SCNC: 5 MMOL/L (ref 3–14)
BUN SERPL-MCNC: 30 MG/DL (ref 7–30)
CALCIUM SERPL-MCNC: 9 MG/DL (ref 8.5–10.1)
CHLORIDE SERPL-SCNC: 109 MMOL/L (ref 94–109)
CO2 SERPL-SCNC: 27 MMOL/L (ref 20–32)
CREAT SERPL-MCNC: 1.5 MG/DL (ref 0.66–1.25)
GFR SERPL CREATININE-BSD FRML MDRD: 43 ML/MIN/{1.73_M2}
GLUCOSE SERPL-MCNC: 117 MG/DL (ref 70–99)
HBA1C MFR BLD: 6.1 % (ref 0–5.6)
POTASSIUM SERPL-SCNC: 4.5 MMOL/L (ref 3.4–5.3)
SODIUM SERPL-SCNC: 141 MMOL/L (ref 133–144)

## 2021-06-18 PROCEDURE — 99214 OFFICE O/P EST MOD 30 MIN: CPT | Performed by: INTERNAL MEDICINE

## 2021-06-18 PROCEDURE — 36415 COLL VENOUS BLD VENIPUNCTURE: CPT | Performed by: INTERNAL MEDICINE

## 2021-06-18 PROCEDURE — 83036 HEMOGLOBIN GLYCOSYLATED A1C: CPT | Performed by: INTERNAL MEDICINE

## 2021-06-18 PROCEDURE — 80048 BASIC METABOLIC PNL TOTAL CA: CPT | Performed by: INTERNAL MEDICINE

## 2021-06-18 RX ORDER — CEPHALEXIN 500 MG/1
500 CAPSULE ORAL 3 TIMES DAILY
Qty: 21 CAPSULE | Refills: 0 | Status: SHIPPED | OUTPATIENT
Start: 2021-06-18 | End: 2022-07-27

## 2021-06-18 ASSESSMENT — PAIN SCALES - GENERAL: PAINLEVEL: MILD PAIN (2)

## 2021-06-18 NOTE — PROGRESS NOTES
Cece Oliva is a 80 year old who presents for the following health issues     HPI     Infection in toenail         EMR reviewed including:             Complaint, History of Chief Complaint, Corresponding Review of Systems, and Complaint Specific Physical Examination.    #1   Red, swollen, painful right great toe.  Present for about 3 days.  Slightly worse today.  History significant onychomycosis of all nails.  No recent trauma to the foot.       Exam:  Redness and inflammation of great toe from the interphalangeal joint to the tip.  No exudate or drainage noted.      #2   Follow-up on type 2 diabetes.  Blood sugar well controlled per log.  Taking medications regularly including 35 units of Lantus insulin plus preprandial Humalog.  Denies hypoglycemic symptoms.  Watching diet closely.  Minimal exercise.  Denies polyuria or polydipsia.       Exam:   LUNGS: clear bilaterally, airflow is brisk, no intercostal retraction or stridor is noted. No coughing is noted during visit.   HEART:  regular without rubs, clicks, gallops, or murmurs. PMI is nondisplaced. Upstrokes are brisk. S1,S2 are heard.   NEURO: Pt is alert and appropriate. No neurologic lateralization is noted. Cranial nerves 2-12 are intact. Peripheral sensory and motor function are grossly normal.         Vital Signs:   /68   Pulse 56   Temp 96.3  F (35.7  C) (Temporal)   Resp 18   Wt 99.3 kg (219 lb)   SpO2 100%   BMI 32.34 kg/m               Decision Making    Problem and Complexity     1. Type 2 diabetes mellitus with hyperglycemia, with long-term current use of insulin (H)  Check A1c as well as renal function.  Continue current medication  - Basic metabolic panel  (Ca, Cl, CO2, Creat, Gluc, K, Na, BUN)  - Hemoglobin A1c  - Fecal colorectal cancer screen (FIT); Future    2. Cellulitis of toe of right foot  Placed on Keflex 3 times daily for the bacterial aspect of the infection.  Recommended observation versus no treatment for  the onychomycosis given the benefit versus risk ratio of oral medication in an 80-year-old gentleman  - cephALEXin (KEFLEX) 500 MG capsule; Take 1 capsule (500 mg) by mouth 3 times daily  Dispense: 21 capsule; Refill: 0          ------------------------------------------------------------------------------------------------------------------------------  Data    4=1/3 5=2/3    1  >3  Specialty (external) notes reviewed:   None  Individual tests ordered (by provider) reviewed:   None  Individual tests ordered (by provider):   Multiple  Independent Historians Interview:   None  2  Review of outside (other providers) Tests:   None  3   Verbal Discussion with Specialists:    None    ----------------------------------------------------------------------------------------------------------------------------------  Risk   Prescription drug management:   Yes                                High risk for toxicity:   Decision regarding surgery:    None   Social determinants of health:   None   Decision regarding hospitalization:     None   Decision to withhold therapy:   None                                 FOLLOW UP   I have asked the patient to make an appointment for followup with me in 1 week if not completely resolved.  Otherwise diabetic follow-up in 4 months.          I have carefully explained the diagnosis and treatment options to the patient.  The patient has displayed an understanding of the above, and all subsequent questions were answered.      DO CAREN Phillips    Portions of this note were produced using [employer]  Although every attempt at real-time proof reading has been made, occasional grammar/syntax errors may have been missed.

## 2021-06-24 ENCOUNTER — TELEPHONE (OUTPATIENT)
Dept: INTERNAL MEDICINE | Facility: CLINIC | Age: 81
End: 2021-06-24

## 2021-06-24 NOTE — LETTER
June 24, 2021    Pj Zhao  [address] [facility] [id number]      Dear ,    We are writing to inform you of your test results.    The chemistry panel shows slightly decreased kidney function.  Blood sugar is minimally elevated at 117. The hemoglobin A1c is stable at 6.1 suggesting very good blood sugar control.     Resulted Orders   Basic metabolic panel  (Ca, Cl, CO2, Creat, Gluc, K, Na, BUN)   Result Value Ref Range    Sodium 141 133 - 144 mmol/L    Potassium 4.5 3.4 - 5.3 mmol/L    Chloride 109 94 - 109 mmol/L    Carbon Dioxide 27 20 - 32 mmol/L    Anion Gap 5 3 - 14 mmol/L    Glucose 117 (H) 70 - 99 mg/dL    Urea Nitrogen 30 7 - 30 mg/dL    Creatinine 1.50 (H) 0.66 - 1.25 mg/dL    GFR Estimate 43 (L) >60 mL/min/[1.73_m2]      Comment:      Non  GFR Calc  Starting 12/18/2018, serum creatinine based estimated GFR (eGFR) will be   calculated using the Chronic Kidney Disease Epidemiology Collaboration   (CKD-EPI) equation.      GFR Estimate If Black 49 (L) >60 mL/min/[1.73_m2]      Comment:       GFR Calc  Starting 12/18/2018, serum creatinine based estimated GFR (eGFR) will be   calculated using the Chronic Kidney Disease Epidemiology Collaboration   (CKD-EPI) equation.      Calcium 9.0 8.5 - 10.1 mg/dL   Hemoglobin A1c   Result Value Ref Range    Hemoglobin A1C 6.1 (H) 0 - 5.6 %      Comment:      Normal <5.7% Prediabetes 5.7-6.4%  Diabetes 6.5% or higher - adopted from ADA   consensus guidelines.     If you have any questions or concerns, please call the clinic at the number listed above.     Sincerely,      Porfirio Burleson, DO

## 2021-06-24 NOTE — TELEPHONE ENCOUNTER
----- Message from Porfirio Burleson DO sent at 6/24/2021  2:22 PM CDT -----  Dear Pj, your recent test results are attached.    The chemistry panel shows slightly decreased kidney function.  Blood sugar is minimally elevated at 117.  The hemoglobin A1c is stable at 6.1 suggesting very good blood sugar control.    You will be contacted with any outstanding results as they are available.  Feel free to contact me via the office or My Chart if you have any questions regarding the above.

## 2021-06-30 ENCOUNTER — TELEPHONE (OUTPATIENT)
Dept: INTERNAL MEDICINE | Facility: CLINIC | Age: 81
End: 2021-06-30

## 2021-06-30 DIAGNOSIS — B37.49 YEAST DERMATITIS OF PENIS: Primary | ICD-10-CM

## 2021-06-30 RX ORDER — FLUCONAZOLE 150 MG/1
TABLET ORAL
Qty: 4 TABLET | Refills: 0 | Status: SHIPPED | OUTPATIENT
Start: 2021-06-30 | End: 2022-07-27

## 2021-06-30 NOTE — TELEPHONE ENCOUNTER
Pj said he started a new rash on his privates on Monday and doesn't know what he should do. Do you want to work him in?

## 2021-06-30 NOTE — TELEPHONE ENCOUNTER
Given the patient's diabetes and recent antibiotic usage, I suspect that he has Candida.  I sent a prescription for Diflucan to his pharmacy.  He should start this at his earliest convenience.    Benjy

## 2021-07-14 DIAGNOSIS — N18.2 TYPE 2 DIABETES MELLITUS WITH STAGE 2 CHRONIC KIDNEY DISEASE, WITHOUT LONG-TERM CURRENT USE OF INSULIN (H): ICD-10-CM

## 2021-07-14 DIAGNOSIS — E11.22 TYPE 2 DIABETES MELLITUS WITH STAGE 2 CHRONIC KIDNEY DISEASE, WITHOUT LONG-TERM CURRENT USE OF INSULIN (H): ICD-10-CM

## 2021-07-14 DIAGNOSIS — I10 HYPERTENSION GOAL BP (BLOOD PRESSURE) < 140/90: ICD-10-CM

## 2021-07-14 DIAGNOSIS — R60.1 GENERALIZED EDEMA: ICD-10-CM

## 2021-07-16 RX ORDER — FUROSEMIDE 20 MG
TABLET ORAL
Qty: 90 TABLET | Refills: 0 | Status: SHIPPED | OUTPATIENT
Start: 2021-07-16 | End: 2021-10-11

## 2021-07-16 RX ORDER — METOPROLOL TARTRATE 50 MG
TABLET ORAL
Qty: 180 TABLET | Refills: 1 | Status: SHIPPED | OUTPATIENT
Start: 2021-07-16 | End: 2022-01-11

## 2021-07-16 NOTE — TELEPHONE ENCOUNTER
Prescription approved per [facility] Refill Protocol. bettye Helms RN on 7/16/2021 at 7:23 AM

## 2021-07-16 NOTE — TELEPHONE ENCOUNTER
Pending Prescriptions:                       Disp   Refills    furosemide (LASIX) 20 MG tablet [Pharmacy *90 tab*0        Sig: TAKE ONE TABLET BY MOUTH ONCE DAILY    metFORMIN (GLUCOPHAGE) 500 MG tablet [Phar*360 ta*1        Sig: TAKE TWO TABLETS BY MOUTH TWICE A DAY WITH MEALS    Signed Prescriptions:                        Disp   Refills    metoprolol tartrate (LOPRESSOR) 50 MG tabl*180 ta*1        Sig: TAKE ONE TABLET BY MOUTH TWICE A DAY  Authorizing Provider: MICKY LINK  Ordering User: MICHELLE HELMS      Routing refill request to provider for review/approval because:  Labs out of range:  creatinine    Michelle Helms RN on 7/16/2021 at 7:24 AM

## 2021-07-23 DIAGNOSIS — R60.1 GENERALIZED EDEMA: ICD-10-CM

## 2021-07-23 RX ORDER — POTASSIUM CHLORIDE 750 MG/1
TABLET, EXTENDED RELEASE ORAL
Qty: 90 TABLET | Refills: 0 | Status: SHIPPED | OUTPATIENT
Start: 2021-07-23 | End: 2021-10-22

## 2021-07-23 NOTE — TELEPHONE ENCOUNTER
Pending Prescriptions:                       Disp   Refills    potassium chloride ER (KLOR-CON M) 10 MEQ*90 tab*0            Sig: TAKE ONE TABLET BY MOUTH ONCE DAILY    Medication is being filled for 1 time matt refill only due to:  Patient is due for med check in oct    Please call and help schedule.  Thank you!

## 2021-07-23 NOTE — TELEPHONE ENCOUNTER
Spoke with patient and informed of note below. Will call another day to schedule.   Maci Jauregui MA

## 2021-08-10 DIAGNOSIS — N18.2 TYPE 2 DIABETES MELLITUS WITH STAGE 2 CHRONIC KIDNEY DISEASE, WITH LONG-TERM CURRENT USE OF INSULIN (H): ICD-10-CM

## 2021-08-10 DIAGNOSIS — E11.22 TYPE 2 DIABETES MELLITUS WITH STAGE 2 CHRONIC KIDNEY DISEASE, WITH LONG-TERM CURRENT USE OF INSULIN (H): ICD-10-CM

## 2021-08-10 DIAGNOSIS — Z79.4 TYPE 2 DIABETES MELLITUS WITH STAGE 2 CHRONIC KIDNEY DISEASE, WITH LONG-TERM CURRENT USE OF INSULIN (H): ICD-10-CM

## 2021-08-11 RX ORDER — INSULIN LISPRO 100 [IU]/ML
INJECTION, SOLUTION INTRAVENOUS; SUBCUTANEOUS
Qty: 30 ML | Refills: 1 | Status: SHIPPED | OUTPATIENT
Start: 2021-08-11 | End: 2021-12-21

## 2021-08-11 NOTE — TELEPHONE ENCOUNTER
Prescription approved per [facility] Refill Protocol.    Karli Helms RN on 8/11/2021 at 5:44 PM

## 2021-09-17 DIAGNOSIS — Z79.4 TYPE 2 DIABETES MELLITUS WITH STAGE 2 CHRONIC KIDNEY DISEASE, WITH LONG-TERM CURRENT USE OF INSULIN (H): ICD-10-CM

## 2021-09-17 DIAGNOSIS — N18.2 TYPE 2 DIABETES MELLITUS WITH STAGE 2 CHRONIC KIDNEY DISEASE, WITH LONG-TERM CURRENT USE OF INSULIN (H): ICD-10-CM

## 2021-09-17 DIAGNOSIS — E11.22 TYPE 2 DIABETES MELLITUS WITH STAGE 2 CHRONIC KIDNEY DISEASE, WITH LONG-TERM CURRENT USE OF INSULIN (H): ICD-10-CM

## 2021-09-20 NOTE — TELEPHONE ENCOUNTER
Prescription approved per [facility] Refill Protocol.    CHICO EspinalN, RN  [facility]

## 2021-10-04 ENCOUNTER — TRANSFERRED RECORDS (OUTPATIENT)
Dept: HEALTH INFORMATION MANAGEMENT | Facility: CLINIC | Age: 81
End: 2021-10-04

## 2021-10-04 LAB — RETINOPATHY: POSITIVE

## 2021-10-05 DIAGNOSIS — E11.8 TYPE 2 DIABETES MELLITUS WITH COMPLICATION (H): ICD-10-CM

## 2021-10-05 DIAGNOSIS — R60.1 GENERALIZED EDEMA: ICD-10-CM

## 2021-10-08 RX ORDER — PEN NEEDLE, DIABETIC 31 GX5/16"
NEEDLE, DISPOSABLE MISCELLANEOUS
Qty: 100 EACH | Refills: 1 | Status: SHIPPED | OUTPATIENT
Start: 2021-10-08 | End: 2022-09-16

## 2021-10-08 NOTE — TELEPHONE ENCOUNTER
Pending Prescriptions:                       Disp   Refills    furosemide (LASIX) 20 MG tablet [Pharmacy *90 tab*0        Sig: TAKE ONE TABLET BY MOUTH ONCE DAILY    Signed Prescriptions:                        Disp   Refills    insulin pen needle (B-D U/F) 31G X 8 MM mi*100 ea*1        Sig: Use to give insulin daily.  Authorizing Provider: MICKY LINK  Ordering User: JENARO MARIN    Routing refill request to provider for review/approval because:  Labs out of range:    Creatinine   Date Value Ref Range Status   06/18/2021 1.50 (H) 0.66 - 1.25 mg/dL Final

## 2021-10-11 RX ORDER — FUROSEMIDE 20 MG
TABLET ORAL
Qty: 90 TABLET | Refills: 0 | Status: SHIPPED | OUTPATIENT
Start: 2021-10-11 | End: 2022-01-11

## 2021-10-13 DIAGNOSIS — E11.22 TYPE 2 DIABETES MELLITUS WITH STAGE 2 CHRONIC KIDNEY DISEASE, WITHOUT LONG-TERM CURRENT USE OF INSULIN (H): ICD-10-CM

## 2021-10-13 DIAGNOSIS — N18.2 TYPE 2 DIABETES MELLITUS WITH STAGE 2 CHRONIC KIDNEY DISEASE, WITHOUT LONG-TERM CURRENT USE OF INSULIN (H): ICD-10-CM

## 2021-10-13 RX ORDER — GABAPENTIN 300 MG/1
CAPSULE ORAL
Qty: 180 CAPSULE | Refills: 1 | Status: SHIPPED | OUTPATIENT
Start: 2021-10-13 | End: 2022-04-15

## 2021-10-13 NOTE — TELEPHONE ENCOUNTER
Gabapentin      Last Written Prescription Date:  5/5/2021  Last Fill Quantity: 180,   # refills: 1  Last Office Visit: 6/18/2021  Future Office visit:       Routing refill request to provider for review/approval because:  Drug not on the G, P or [facility] refill protocol or controlled substance

## 2021-10-20 DIAGNOSIS — R60.1 GENERALIZED EDEMA: ICD-10-CM

## 2021-10-22 RX ORDER — POTASSIUM CHLORIDE 750 MG/1
TABLET, EXTENDED RELEASE ORAL
Qty: 90 TABLET | Refills: 0 | Status: SHIPPED | OUTPATIENT
Start: 2021-10-22 | End: 2022-01-11

## 2021-11-16 DIAGNOSIS — E11.22 TYPE 2 DIABETES MELLITUS WITH STAGE 2 CHRONIC KIDNEY DISEASE, WITHOUT LONG-TERM CURRENT USE OF INSULIN (H): ICD-10-CM

## 2021-11-16 DIAGNOSIS — N18.2 TYPE 2 DIABETES MELLITUS WITH STAGE 2 CHRONIC KIDNEY DISEASE, WITHOUT LONG-TERM CURRENT USE OF INSULIN (H): ICD-10-CM

## 2021-11-17 NOTE — TELEPHONE ENCOUNTER
Lisinopril  Routing refill request to provider for review/approval because:  Labs out of range:  CR    Haylee Garcia RN

## 2021-11-18 RX ORDER — LISINOPRIL 20 MG/1
TABLET ORAL
Qty: 180 TABLET | Refills: 1 | Status: SHIPPED | OUTPATIENT
Start: 2021-11-18 | End: 2022-05-26

## 2021-12-18 ENCOUNTER — TELEPHONE (OUTPATIENT)
Dept: INTERNAL MEDICINE | Facility: CLINIC | Age: 81
End: 2021-12-18
Payer: COMMERCIAL

## 2021-12-18 DIAGNOSIS — E11.22 TYPE 2 DIABETES MELLITUS WITH STAGE 2 CHRONIC KIDNEY DISEASE, WITH LONG-TERM CURRENT USE OF INSULIN (H): ICD-10-CM

## 2021-12-18 DIAGNOSIS — Z79.4 TYPE 2 DIABETES MELLITUS WITH STAGE 2 CHRONIC KIDNEY DISEASE, WITH LONG-TERM CURRENT USE OF INSULIN (H): ICD-10-CM

## 2021-12-18 DIAGNOSIS — N18.2 TYPE 2 DIABETES MELLITUS WITH STAGE 2 CHRONIC KIDNEY DISEASE, WITH LONG-TERM CURRENT USE OF INSULIN (H): ICD-10-CM

## 2021-12-21 RX ORDER — INSULIN LISPRO 100 [IU]/ML
INJECTION, SOLUTION INTRAVENOUS; SUBCUTANEOUS
Qty: 30 ML | Refills: 0 | Status: SHIPPED | OUTPATIENT
Start: 2021-12-21 | End: 2021-12-22

## 2021-12-21 NOTE — TELEPHONE ENCOUNTER
Insulin lispro  Routing refill request to provider for review/approval because:  Labs not current:  A1C    Haylee Garcia RN

## 2021-12-22 RX ORDER — INSULIN LISPRO 100 [IU]/ML
INJECTION, SOLUTION INTRAVENOUS; SUBCUTANEOUS
Qty: 30 ML | Refills: 0 | Status: SHIPPED | OUTPATIENT
Start: 2021-12-22 | End: 2022-02-24

## 2021-12-22 NOTE — TELEPHONE ENCOUNTER
Pharmacy needs a max dose on the prescription. They will not fill without this and patient is on his way to pick it up.

## 2022-01-01 ENCOUNTER — TRANSFERRED RECORDS (OUTPATIENT)
Dept: MULTI SPECIALTY CLINIC | Facility: CLINIC | Age: 82
End: 2022-01-01

## 2022-01-01 LAB — RETINOPATHY: NORMAL

## 2022-01-10 DIAGNOSIS — N18.2 TYPE 2 DIABETES MELLITUS WITH STAGE 2 CHRONIC KIDNEY DISEASE, WITHOUT LONG-TERM CURRENT USE OF INSULIN (H): ICD-10-CM

## 2022-01-10 DIAGNOSIS — R60.1 GENERALIZED EDEMA: ICD-10-CM

## 2022-01-10 DIAGNOSIS — E11.22 TYPE 2 DIABETES MELLITUS WITH STAGE 2 CHRONIC KIDNEY DISEASE, WITHOUT LONG-TERM CURRENT USE OF INSULIN (H): ICD-10-CM

## 2022-01-10 DIAGNOSIS — I10 HYPERTENSION GOAL BP (BLOOD PRESSURE) < 140/90: ICD-10-CM

## 2022-01-11 RX ORDER — METOPROLOL TARTRATE 50 MG
TABLET ORAL
Qty: 180 TABLET | Refills: 0 | Status: SHIPPED | OUTPATIENT
Start: 2022-01-11 | End: 2022-04-15

## 2022-01-11 RX ORDER — POTASSIUM CHLORIDE 750 MG/1
TABLET, EXTENDED RELEASE ORAL
Qty: 90 TABLET | Refills: 0 | Status: SHIPPED | OUTPATIENT
Start: 2022-01-11 | End: 2022-04-15

## 2022-01-11 RX ORDER — FUROSEMIDE 20 MG
TABLET ORAL
Qty: 90 TABLET | Refills: 0 | Status: SHIPPED | OUTPATIENT
Start: 2022-01-11 | End: 2022-04-11

## 2022-01-11 NOTE — TELEPHONE ENCOUNTER
Routing refill request to provider for review/approval because:  Labs out of range:  Creatinine, GFR  Labs not current:  A1C  Patient needs to be seen because:  due for diabetic follow up    CHICO EspinalN, RN  [facility]

## 2022-02-01 DIAGNOSIS — E78.5 HYPERLIPIDEMIA LDL GOAL <100: ICD-10-CM

## 2022-02-01 RX ORDER — ATORVASTATIN CALCIUM 40 MG/1
TABLET, FILM COATED ORAL
Qty: 90 TABLET | Refills: 0 | Status: SHIPPED | OUTPATIENT
Start: 2022-02-01 | End: 2022-04-28

## 2022-02-01 NOTE — TELEPHONE ENCOUNTER
Pending Prescriptions:                       Disp   Refills    atorvastatin (LIPITOR) 40 MG tablet [Pharm*90 tab*0        Sig: TAKE ONE TABLET BY MOUTH ONCE DAILY    Routing refill request to provider for review/approval because:  Labs not current:    LDL Cholesterol Calculated   Date Value Ref Range Status   10/30/2020 50 <100 mg/dL Final     Comment:     Desirable:       <100 mg/dl

## 2022-02-03 DIAGNOSIS — E11.65 TYPE 2 DIABETES MELLITUS WITH HYPERGLYCEMIA (H): ICD-10-CM

## 2022-02-04 RX ORDER — BLOOD SUGAR DIAGNOSTIC
STRIP MISCELLANEOUS
Qty: 100 STRIP | Refills: 5 | Status: SHIPPED | OUTPATIENT
Start: 2022-02-04 | End: 2022-07-27

## 2022-02-04 NOTE — TELEPHONE ENCOUNTER
One touch test strips  Prescription approved per [facility] Refill Protocol.    Haylee Garcia RN

## 2022-02-23 DIAGNOSIS — Z79.4 TYPE 2 DIABETES MELLITUS WITH STAGE 2 CHRONIC KIDNEY DISEASE, WITH LONG-TERM CURRENT USE OF INSULIN (H): ICD-10-CM

## 2022-02-23 DIAGNOSIS — N18.2 TYPE 2 DIABETES MELLITUS WITH STAGE 2 CHRONIC KIDNEY DISEASE, WITH LONG-TERM CURRENT USE OF INSULIN (H): ICD-10-CM

## 2022-02-23 DIAGNOSIS — E11.22 TYPE 2 DIABETES MELLITUS WITH STAGE 2 CHRONIC KIDNEY DISEASE, WITH LONG-TERM CURRENT USE OF INSULIN (H): ICD-10-CM

## 2022-02-24 RX ORDER — INSULIN LISPRO 100 [IU]/ML
INJECTION, SOLUTION INTRAVENOUS; SUBCUTANEOUS
Qty: 30 ML | Refills: 0 | Status: SHIPPED | OUTPATIENT
Start: 2022-02-24 | End: 2022-05-04

## 2022-02-24 NOTE — TELEPHONE ENCOUNTER
Pending Prescriptions:                       Disp   Refills    HUMALOG KWIKPEN 100 UNIT/ML soln [Pharmacy*30 mL  0        Sig: INJECT 16 UNITS SUBCUTANEOUSLY BEFORE BREAKFAST, 18           UNITS BEFORE LUNCH, AND 18 UNITS BEFORE DINNER           PLUS SLIDING SCALE AS DIRECTED. MAX OF 70 UNITS           DAILY      Routing refill request to provider for review/approval because:  Nya given x1 and patient did not follow up, please advise, due for A1C    Padmini Toussaint RN

## 2022-03-09 DIAGNOSIS — Z79.4 TYPE 2 DIABETES MELLITUS WITH STAGE 2 CHRONIC KIDNEY DISEASE, WITH LONG-TERM CURRENT USE OF INSULIN (H): ICD-10-CM

## 2022-03-09 DIAGNOSIS — N18.2 TYPE 2 DIABETES MELLITUS WITH STAGE 2 CHRONIC KIDNEY DISEASE, WITH LONG-TERM CURRENT USE OF INSULIN (H): ICD-10-CM

## 2022-03-09 DIAGNOSIS — E11.22 TYPE 2 DIABETES MELLITUS WITH STAGE 2 CHRONIC KIDNEY DISEASE, WITH LONG-TERM CURRENT USE OF INSULIN (H): ICD-10-CM

## 2022-03-09 RX ORDER — INSULIN GLARGINE 100 [IU]/ML
INJECTION, SOLUTION SUBCUTANEOUS
Qty: 30 ML | Refills: 1 | Status: SHIPPED | OUTPATIENT
Start: 2022-03-09 | End: 2022-09-09

## 2022-03-09 NOTE — TELEPHONE ENCOUNTER
"Pending Prescriptions:                       Disp   Refills    LANTUS SOLOSTAR 100 UNIT/ML soln [Pharmacy*30 mL  1        Sig: INJECT 35 UNITS UNDER THE SKIN ONCE DAILY    Routing refill request to provider for review/approval because:  Labs not current:      Requested Prescriptions   Pending Prescriptions Disp Refills    LANTUS SOLOSTAR 100 UNIT/ML soln [Pharmacy Med Name: LANTUS SOLOSTAR 100U/ML SPN] 30 mL 1     Sig: INJECT 35 UNITS UNDER THE SKIN ONCE DAILY        Long Acting Insulin Protocol Failed - 3/9/2022 11:54 AM        Failed - HgbA1C in past 3 or 6 months     If HgbA1C is 8 or greater, it needs to be on file within the past 3 months.  If less than 8, must be on file within the past 6 months.     Recent Labs   Lab Test 06/18/21  0751   A1C 6.1*             Failed - Recent (6 mo) or future (30 days) visit within the authorizing provider's specialty     Patient had office visit in the last 6 months or has a visit in the next 30 days with authorizing provider or within the authorizing provider's specialty.  See \"Patient Info\" tab in inbasket, or \"Choose Columns\" in Meds & Orders section of the refill encounter.            Passed - Serum creatinine on file in past 12 months     Recent Labs   Lab Test 06/18/21  0751   CR 1.50*       Ok to refill medication if creatinine is low          Passed - Medication is active on med list        Passed - Patient is age 18 or older                  "

## 2022-04-08 DIAGNOSIS — R60.1 GENERALIZED EDEMA: ICD-10-CM

## 2022-04-08 NOTE — TELEPHONE ENCOUNTER
Pending Prescriptions:                       Disp   Refills    furosemide (LASIX) 20 MG tablet [Pharmacy *90 tab*0        Sig: TAKE ONE TABLET BY MOUTH ONCE DAILY      Routing refill request to provider for review/approval because:  Labs out of range:    Creatinine   Date Value Ref Range Status   06/18/2021 1.50 (H) 0.66 - 1.25 mg/dL Final        Padmini Toussaint RN

## 2022-04-11 RX ORDER — FUROSEMIDE 20 MG
TABLET ORAL
Qty: 90 TABLET | Refills: 0 | Status: SHIPPED | OUTPATIENT
Start: 2022-04-11 | End: 2022-07-14

## 2022-04-14 DIAGNOSIS — I10 HYPERTENSION GOAL BP (BLOOD PRESSURE) < 140/90: ICD-10-CM

## 2022-04-14 DIAGNOSIS — N18.2 TYPE 2 DIABETES MELLITUS WITH STAGE 2 CHRONIC KIDNEY DISEASE, WITHOUT LONG-TERM CURRENT USE OF INSULIN (H): ICD-10-CM

## 2022-04-14 DIAGNOSIS — E11.22 TYPE 2 DIABETES MELLITUS WITH STAGE 2 CHRONIC KIDNEY DISEASE, WITHOUT LONG-TERM CURRENT USE OF INSULIN (H): ICD-10-CM

## 2022-04-14 DIAGNOSIS — R60.1 GENERALIZED EDEMA: ICD-10-CM

## 2022-04-15 RX ORDER — GABAPENTIN 300 MG/1
CAPSULE ORAL
Qty: 180 CAPSULE | Refills: 1 | Status: SHIPPED | OUTPATIENT
Start: 2022-04-15 | End: 2022-07-27

## 2022-04-15 RX ORDER — METOPROLOL TARTRATE 50 MG
TABLET ORAL
Qty: 180 TABLET | Refills: 0 | Status: SHIPPED | OUTPATIENT
Start: 2022-04-15 | End: 2022-07-15

## 2022-04-15 RX ORDER — POTASSIUM CHLORIDE 750 MG/1
TABLET, EXTENDED RELEASE ORAL
Qty: 90 TABLET | Refills: 0 | Status: SHIPPED | OUTPATIENT
Start: 2022-04-15 | End: 2022-07-15

## 2022-04-15 NOTE — TELEPHONE ENCOUNTER
Neurontin  Routing refill request to provider for review/approval because:  Drug not on the [facility] refill protocol     Lopressor  Prescription approved per [facility] Refill Protocol.    Metformin  Routing refill request to provider for review/approval because:  Labs not current:  A1C    klorcon  Prescription approved per [facility] Refill Protocol.      Haylee Garcia RN

## 2022-04-27 DIAGNOSIS — E78.5 HYPERLIPIDEMIA LDL GOAL <100: ICD-10-CM

## 2022-04-28 RX ORDER — ATORVASTATIN CALCIUM 40 MG/1
TABLET, FILM COATED ORAL
Qty: 90 TABLET | Refills: 0 | Status: SHIPPED | OUTPATIENT
Start: 2022-04-28 | End: 2022-07-15

## 2022-04-28 NOTE — TELEPHONE ENCOUNTER
Pending Prescriptions:                       Disp   Refills    atorvastatin (LIPITOR) 40 MG tablet [Pharm*90 tab*0        Sig: TAKE ONE TABLET BY MOUTH ONCE DAILY      Routing refill request to provider for review/approval because:  Nya given x1 and patient did not follow up, please advise    Padmini Toussaint RN

## 2022-05-02 DIAGNOSIS — E11.22 TYPE 2 DIABETES MELLITUS WITH STAGE 2 CHRONIC KIDNEY DISEASE, WITH LONG-TERM CURRENT USE OF INSULIN (H): ICD-10-CM

## 2022-05-02 DIAGNOSIS — N18.2 TYPE 2 DIABETES MELLITUS WITH STAGE 2 CHRONIC KIDNEY DISEASE, WITH LONG-TERM CURRENT USE OF INSULIN (H): ICD-10-CM

## 2022-05-02 DIAGNOSIS — Z79.4 TYPE 2 DIABETES MELLITUS WITH STAGE 2 CHRONIC KIDNEY DISEASE, WITH LONG-TERM CURRENT USE OF INSULIN (H): ICD-10-CM

## 2022-05-04 RX ORDER — INSULIN LISPRO 100 [IU]/ML
INJECTION, SOLUTION INTRAVENOUS; SUBCUTANEOUS
Qty: 30 ML | Refills: 0 | Status: SHIPPED | OUTPATIENT
Start: 2022-05-04 | End: 2022-07-12

## 2022-05-04 NOTE — TELEPHONE ENCOUNTER
Pending Prescriptions:                       Disp   Refills    HUMALOG KWIKPEN 100 UNIT/ML soln [Pharmacy*30 mL  0        Sig: INJECT 16 UNITS SUBCUTANEOUSLY BEFORE BREAKFAST, 18           UNITS BEFORE LUNCH AND 18 UNITS BEFORE DINNER,           PLUS SLIDING SCALE AS DIRECTED * MAX OF 70 UNITS           DAILY *    Routing refill request to provider for review/approval because:  Labs out of range:  CR  Labs not current:  A1C    Haylee Garcia RN

## 2022-05-25 DIAGNOSIS — N18.2 TYPE 2 DIABETES MELLITUS WITH STAGE 2 CHRONIC KIDNEY DISEASE, WITHOUT LONG-TERM CURRENT USE OF INSULIN (H): ICD-10-CM

## 2022-05-25 DIAGNOSIS — E11.22 TYPE 2 DIABETES MELLITUS WITH STAGE 2 CHRONIC KIDNEY DISEASE, WITHOUT LONG-TERM CURRENT USE OF INSULIN (H): ICD-10-CM

## 2022-05-26 RX ORDER — LISINOPRIL 20 MG/1
TABLET ORAL
Qty: 180 TABLET | Refills: 1 | Status: SHIPPED | OUTPATIENT
Start: 2022-05-26 | End: 2022-11-23

## 2022-05-26 NOTE — TELEPHONE ENCOUNTER
Routing refill request to provider for review/approval because:  Labs not current:  CRE      Ok Hale RN

## 2022-07-08 DIAGNOSIS — Z79.4 TYPE 2 DIABETES MELLITUS WITH STAGE 2 CHRONIC KIDNEY DISEASE, WITH LONG-TERM CURRENT USE OF INSULIN (H): ICD-10-CM

## 2022-07-08 DIAGNOSIS — E11.22 TYPE 2 DIABETES MELLITUS WITH STAGE 2 CHRONIC KIDNEY DISEASE, WITH LONG-TERM CURRENT USE OF INSULIN (H): ICD-10-CM

## 2022-07-08 DIAGNOSIS — N18.2 TYPE 2 DIABETES MELLITUS WITH STAGE 2 CHRONIC KIDNEY DISEASE, WITH LONG-TERM CURRENT USE OF INSULIN (H): ICD-10-CM

## 2022-07-12 RX ORDER — INSULIN LISPRO 100 [IU]/ML
INJECTION, SOLUTION INTRAVENOUS; SUBCUTANEOUS
Qty: 30 ML | Refills: 0 | Status: SHIPPED | OUTPATIENT
Start: 2022-07-12 | End: 2022-09-16

## 2022-07-12 NOTE — TELEPHONE ENCOUNTER
Pending Prescriptions:                       Disp   Refills    insulin lispro (HUMALOG KWIKPEN) 100 UNIT/*30 mL  0        Sig: INJECT 16 UNITS SUBCUTANEOUSLY BEFORE BREAKFAST, 18           UNITS BEFORE LUNCH AND 18 UNITS BEFORE DINNER,           PLUS SLIDING SCALE AS DIRECTED * MAX OF 70 UNITS           DAILY *      Routing refill request to provider for review/approval because:   Serum creatinine on file in past 12 months    HgbA1C in past 3 or 6 months    Recent (6 mo) or future (30 days) visit within the authorizing provider's specialty       Padmini Toussaint RN

## 2022-07-14 DIAGNOSIS — E78.5 HYPERLIPIDEMIA LDL GOAL <100: ICD-10-CM

## 2022-07-14 DIAGNOSIS — N18.2 TYPE 2 DIABETES MELLITUS WITH STAGE 2 CHRONIC KIDNEY DISEASE, WITHOUT LONG-TERM CURRENT USE OF INSULIN (H): ICD-10-CM

## 2022-07-14 DIAGNOSIS — E11.22 TYPE 2 DIABETES MELLITUS WITH STAGE 2 CHRONIC KIDNEY DISEASE, WITHOUT LONG-TERM CURRENT USE OF INSULIN (H): ICD-10-CM

## 2022-07-14 DIAGNOSIS — R60.1 GENERALIZED EDEMA: ICD-10-CM

## 2022-07-14 DIAGNOSIS — I10 HYPERTENSION GOAL BP (BLOOD PRESSURE) < 140/90: ICD-10-CM

## 2022-07-15 ENCOUNTER — TELEPHONE (OUTPATIENT)
Dept: INTERNAL MEDICINE | Facility: CLINIC | Age: 82
End: 2022-07-15

## 2022-07-15 RX ORDER — METOPROLOL TARTRATE 50 MG
50 TABLET ORAL 2 TIMES DAILY
Qty: 180 TABLET | Refills: 0 | Status: SHIPPED | OUTPATIENT
Start: 2022-07-15 | End: 2022-10-15

## 2022-07-15 RX ORDER — FUROSEMIDE 20 MG
20 TABLET ORAL DAILY
Qty: 90 TABLET | Refills: 0 | Status: SHIPPED | OUTPATIENT
Start: 2022-07-15 | End: 2022-10-03

## 2022-07-15 RX ORDER — ATORVASTATIN CALCIUM 40 MG/1
40 TABLET, FILM COATED ORAL DAILY
Qty: 90 TABLET | Refills: 0 | Status: SHIPPED | OUTPATIENT
Start: 2022-07-15 | End: 2022-07-27

## 2022-07-15 RX ORDER — POTASSIUM CHLORIDE 750 MG/1
10 TABLET, EXTENDED RELEASE ORAL DAILY
Qty: 90 TABLET | Refills: 0 | Status: SHIPPED | OUTPATIENT
Start: 2022-07-15 | End: 2022-10-15

## 2022-07-15 NOTE — TELEPHONE ENCOUNTER
Routing refill request to provider for review/approval because:  Labs out of range  Needs appointment

## 2022-07-15 NOTE — TELEPHONE ENCOUNTER
Reason for Call:  Medication or medication refill:    Do you use a [facility]?  Name of the pharmacy and phone number for the current request: PHARMACY PENDED     Name of the medication requested:   -potassium chloride ER 10 MEQ  -metoprolol tartrate (LOPRESSOR) 50 MG tablet  -metFORMIN (GLUCOPHAGE) 500   -furosemide (LASIX) 20 MG tablet  -atorvastatin (LIPITOR) 40 MG tablet    Other request: Wife called requesting these be send ASAP.She stated she had pharmacy sent request last week.    Can we leave a detailed message on this number? YES    Phone number patient can be reached at: Cell number on file:    Telephone Information:   Mobile [phone]       Best Time: anytime    Call taken on 7/15/2022 at 9:05 AM by Avelina Reyes

## 2022-07-15 NOTE — TELEPHONE ENCOUNTER
Patient calling stating he pharmacy did not get his medications sent to pharmacy. Confirmed all meds were sent to his pharmacy and also set up appointment for patient.     CHICO WinN, RN

## 2022-07-27 ENCOUNTER — OFFICE VISIT (OUTPATIENT)
Dept: INTERNAL MEDICINE | Facility: CLINIC | Age: 82
End: 2022-07-27
Payer: COMMERCIAL

## 2022-07-27 VITALS
WEIGHT: 226.44 LBS | HEART RATE: 80 BPM | RESPIRATION RATE: 20 BRPM | OXYGEN SATURATION: 98 % | TEMPERATURE: 97.1 F | SYSTOLIC BLOOD PRESSURE: 130 MMHG | HEIGHT: 68 IN | BODY MASS INDEX: 34.32 KG/M2 | DIASTOLIC BLOOD PRESSURE: 84 MMHG

## 2022-07-27 DIAGNOSIS — N18.31 STAGE 3A CHRONIC KIDNEY DISEASE (H): ICD-10-CM

## 2022-07-27 DIAGNOSIS — Z12.11 SCREEN FOR COLON CANCER: ICD-10-CM

## 2022-07-27 DIAGNOSIS — D69.6 THROMBOCYTOPENIA (H): ICD-10-CM

## 2022-07-27 DIAGNOSIS — C18.9 MALIGNANT NEOPLASM OF COLON, UNSPECIFIED PART OF COLON (H): ICD-10-CM

## 2022-07-27 DIAGNOSIS — I73.9 INTERMITTENT CLAUDICATION (H): ICD-10-CM

## 2022-07-27 DIAGNOSIS — E78.5 HYPERLIPIDEMIA LDL GOAL <100: ICD-10-CM

## 2022-07-27 DIAGNOSIS — E11.22 TYPE 2 DIABETES MELLITUS WITH STAGE 2 CHRONIC KIDNEY DISEASE, WITH LONG-TERM CURRENT USE OF INSULIN (H): ICD-10-CM

## 2022-07-27 DIAGNOSIS — N18.2 TYPE 2 DIABETES MELLITUS WITH STAGE 2 CHRONIC KIDNEY DISEASE, WITH LONG-TERM CURRENT USE OF INSULIN (H): ICD-10-CM

## 2022-07-27 DIAGNOSIS — R53.1 ACUTE RIGHT-SIDED WEAKNESS: Primary | ICD-10-CM

## 2022-07-27 DIAGNOSIS — Z23 HIGH PRIORITY FOR COVID-19 VACCINATION: ICD-10-CM

## 2022-07-27 DIAGNOSIS — Z13.220 SCREENING FOR HYPERLIPIDEMIA: ICD-10-CM

## 2022-07-27 DIAGNOSIS — I25.10 ATHEROSCLEROSIS OF NATIVE CORONARY ARTERY OF NATIVE HEART WITHOUT ANGINA PECTORIS: ICD-10-CM

## 2022-07-27 DIAGNOSIS — Z79.4 TYPE 2 DIABETES MELLITUS WITH STAGE 2 CHRONIC KIDNEY DISEASE, WITH LONG-TERM CURRENT USE OF INSULIN (H): ICD-10-CM

## 2022-07-27 LAB
ANION GAP SERPL CALCULATED.3IONS-SCNC: 5 MMOL/L (ref 3–14)
BUN SERPL-MCNC: 21 MG/DL (ref 7–30)
CALCIUM SERPL-MCNC: 8.5 MG/DL (ref 8.5–10.1)
CHLORIDE BLD-SCNC: 113 MMOL/L (ref 94–109)
CHOLEST SERPL-MCNC: 97 MG/DL
CO2 SERPL-SCNC: 24 MMOL/L (ref 20–32)
CREAT SERPL-MCNC: 1.31 MG/DL (ref 0.66–1.25)
CREAT UR-MCNC: 28 MG/DL
FASTING STATUS PATIENT QL REPORTED: YES
GFR SERPL CREATININE-BSD FRML MDRD: 54 ML/MIN/1.73M2
GLUCOSE BLD-MCNC: 115 MG/DL (ref 70–99)
HBA1C MFR BLD: 6 % (ref 0–5.6)
HDLC SERPL-MCNC: 35 MG/DL
HGB BLD-MCNC: 13.8 G/DL (ref 13.3–17.7)
LDLC SERPL CALC-MCNC: 32 MG/DL
MICROALBUMIN UR-MCNC: 17 MG/L
MICROALBUMIN/CREAT UR: 60.71 MG/G CR (ref 0–17)
NONHDLC SERPL-MCNC: 62 MG/DL
POTASSIUM BLD-SCNC: 4.7 MMOL/L (ref 3.4–5.3)
SODIUM SERPL-SCNC: 142 MMOL/L (ref 133–144)
TRIGL SERPL-MCNC: 149 MG/DL

## 2022-07-27 PROCEDURE — 82043 UR ALBUMIN QUANTITATIVE: CPT | Performed by: INTERNAL MEDICINE

## 2022-07-27 PROCEDURE — 0064A COVID-19,PF,MODERNA (18+ YRS BOOSTER .25ML): CPT | Performed by: INTERNAL MEDICINE

## 2022-07-27 PROCEDURE — 85018 HEMOGLOBIN: CPT | Performed by: INTERNAL MEDICINE

## 2022-07-27 PROCEDURE — 80048 BASIC METABOLIC PNL TOTAL CA: CPT | Performed by: INTERNAL MEDICINE

## 2022-07-27 PROCEDURE — 99214 OFFICE O/P EST MOD 30 MIN: CPT | Mod: 25 | Performed by: INTERNAL MEDICINE

## 2022-07-27 PROCEDURE — 99207 PR FOOT EXAM NO CHARGE: CPT | Performed by: INTERNAL MEDICINE

## 2022-07-27 PROCEDURE — 91306 COVID-19,PF,MODERNA (18+ YRS BOOSTER .25ML): CPT | Performed by: INTERNAL MEDICINE

## 2022-07-27 PROCEDURE — 36415 COLL VENOUS BLD VENIPUNCTURE: CPT | Performed by: INTERNAL MEDICINE

## 2022-07-27 PROCEDURE — 80061 LIPID PANEL: CPT | Performed by: INTERNAL MEDICINE

## 2022-07-27 PROCEDURE — 83036 HEMOGLOBIN GLYCOSYLATED A1C: CPT | Performed by: INTERNAL MEDICINE

## 2022-07-27 RX ORDER — GABAPENTIN 300 MG/1
CAPSULE ORAL
Qty: 180 CAPSULE | Refills: 1 | Status: SHIPPED | OUTPATIENT
Start: 2022-07-27 | End: 2022-07-27

## 2022-07-27 RX ORDER — GABAPENTIN 300 MG/1
CAPSULE ORAL
Qty: 180 CAPSULE | Refills: 3 | Status: SHIPPED | OUTPATIENT
Start: 2022-07-27 | End: 2022-12-22

## 2022-07-27 RX ORDER — BLOOD SUGAR DIAGNOSTIC
STRIP MISCELLANEOUS
Qty: 100 STRIP | Refills: 5 | Status: SHIPPED | OUTPATIENT
Start: 2022-07-27 | End: 2023-04-27

## 2022-07-27 RX ORDER — ATORVASTATIN CALCIUM 40 MG/1
40 TABLET, FILM COATED ORAL DAILY
Qty: 90 TABLET | Refills: 0 | Status: SHIPPED | OUTPATIENT
Start: 2022-07-27 | End: 2022-07-27

## 2022-07-27 RX ORDER — ATORVASTATIN CALCIUM 40 MG/1
40 TABLET, FILM COATED ORAL DAILY
Qty: 90 TABLET | Refills: 3 | Status: SHIPPED | OUTPATIENT
Start: 2022-07-27 | End: 2022-12-22

## 2022-07-27 ASSESSMENT — PAIN SCALES - GENERAL: PAINLEVEL: NO PAIN (0)

## 2022-07-27 NOTE — PROGRESS NOTES
"      Cece Oliva is a 81 year old, presenting for the following health issues:  Referral and Labs Only      History of Present Illness       Reason for visit:  Blood work referral    He eats 2-3 servings of fruits and vegetables daily.He consumes 0 sweetened beverage(s) daily.He exercises with enough effort to increase his heart rate 9 or less minutes per day.  He exercises with enough effort to increase his heart rate 3 or less days per week.   He is taking medications regularly.        EMR reviewed including:             Complaint, History of Chief Complaint, Corresponding Review of Systems, and Complaint Specific Physical Examination.    #1   Follow-up on type 2 diabetes  Patient last seen over a year ago  Continues to take medications as listed  Denies polyuria or polydipsia  Notes that his blood sugars are \"a little high\"  Unable to quantitate at this time.  Watches diet fairly well.        Exam:   LUNGS: clear bilaterally, airflow is brisk, no intercostal retraction or stridor is noted. No coughing is noted during visit.   HEART:  regular without rubs, clicks, gallops, or murmurs. PMI is nondisplaced. Upstrokes are brisk. S1,S2 are heard.   NEURO: Pt is alert and appropriate. No neurologic lateralization is noted. Cranial nerves 2-12 are intact. Peripheral sensory function is decreased   feet: no evidence of skin breakdown or ulceration is noted.  Sensation is decreased to monofilament and vibration.  Pulses are strong, capillary refill is brisk.      #2   Intermittent diarrhea and constipation  Notes daily constipation followed by diarrhea  Doing much better now that he is taking Citrucel per recommendations of the urgent care  Denies any melena or hematochezia.        Exam:   GI: Abdomen is soft, without rebound, guarding or tenderness. Bowel sounds are appropriate. No renal bruits are heard.      #3   Right-sided weakness  History of previous CVA  Walking with a cane but stumbles frequently.       " " Exam:  No gross discernible weakness at this time.  Does have some difficulty with coordination.  Sensation decreased in the feet and toes.      #4   Coronary and peripheral artery disease  History of previous bypass  Has mild symptoms of intermittent claudication but rarely exerts himself to the point.  Denies any chest pain, diaphoresis, sweating etc.        Exam:  As above        #5   History of colon cancer status postresection.  Most probably the cause for his intermittent diarrhea.   (Short-bowel syndrome)  Due for colonoscopy        Patient has been interviewed, applicable history and applied review of systems have been performed.    Vital Signs:   /84   Pulse 80   Temp 97.1  F (36.2  C) (Temporal)   Resp 20   Ht 1.727 m (5' 8\")   Wt 102.7 kg (226 lb 7 oz)   SpO2 98%   BMI 34.43 kg/m        Decision Making    Problem and Complexity     1. Type 2 diabetes mellitus with stage 2 chronic kidney disease, with long-term current use of insulin (H)  We will check lab work and evaluate the extent of his diabetic management.  Looks like control is inadequate, would discuss possibility of consulting MTM as requested by somebody.  - Albumin Random Urine Quantitative with Creat Ratio; Future  - HEMOGLOBIN A1C; Future  - BASIC METABOLIC PANEL; Future  - blood glucose (ONETOUCH ULTRA) test strip; USE TO TEST FOUR TIMES A DAY  Dispense: 100 strip; Refill: 5  - gabapentin (NEURONTIN) 300 MG capsule; TAKE TWO CAPSULES BY MOUTH AT BEDTIME  Dispense: 180 capsule; Refill: 3  - FOOT EXAM    2. Stage 3a chronic kidney disease (H)  Check renal function and electrolytes  - Hemoglobin; Future    3. Acute right-sided weakness  We will set up with physical therapy for his instability and stumbling  - Physical Therapy Referral; Future    4. Atherosclerosis of native coronary artery of native heart without angina pectoris  Currently stable.  Patient follows regular cardiology    5. Hyperlipidemia LDL goal <100  Continue " atorvastatin and low-fat diet  - atorvastatin (LIPITOR) 40 MG tablet; Take 1 tablet (40 mg) by mouth daily  Dispense: 90 tablet; Refill: 3    6. Thrombocytopenia (H)  Check CBC    7. Malignant neoplasm of colon, unspecified part of colon (H)  Schedule colonoscopy    8. Screen for colon cancer  Advil as above  - Colonscopy Screening  Referral; Future    9. Screening for hyperlipidemia  Continue statin and adjust accordingly  - Lipid panel reflex to direct LDL Non-fasting; Future    10. Intermittent claudication (H)  Modest at this time                                FOLLOW UP   I have asked the patient to make an appointment for followup with me in 3 months or as needed        I have carefully explained the diagnosis and treatment options to the patient.  The patient has displayed an understanding of the above, and all subsequent questions were answered.      DO CAREN Phillips    Portions of this note were produced using [employer]  Although every attempt at real-time proof reading has been made, occasional grammar/syntax errors may have been missed.

## 2022-07-27 NOTE — LETTER
August 1, 2022      Pj CRYSTAL Zhao  [id number] [facility] [address]        Dear ,    We are writing to inform you of your test results.    Microalbumin is up at 60.7.  This would suggest some protein loss through the kidneys.   Hemoglobin A1c is excellent at 6.0 suggesting very good blood sugar control.   The cholesterol is within acceptable limits.   Chemistry panel shows a slight decrease in kidney function though it has improved over previous.  Blood sugar slightly elevated at 115.   Hemoglobin is 13.8 suggesting resolution of the previous anemia.     You will be contacted with any outstanding results as they are available.   Feel free to contact me via the office or My Chart if you have any questions regarding the a      Resulted Orders   Hemoglobin   Result Value Ref Range    Hemoglobin 13.8 13.3 - 17.7 g/dL   Lipid panel reflex to direct LDL Non-fasting   Result Value Ref Range    Cholesterol 97 <200 mg/dL    Triglycerides 149 <150 mg/dL    Direct Measure HDL 35 (L) >=40 mg/dL    LDL Cholesterol Calculated 32 <=100 mg/dL    Non HDL Cholesterol 62 <130 mg/dL    Patient Fasting > 8hrs? Yes     Narrative    Cholesterol  Desirable:  <200 mg/dL    Triglycerides  Normal:  Less than 150 mg/dL  Borderline High:  150-199 mg/dL  High:  200-499 mg/dL  Very High:  Greater than or equal to 500 mg/dL    Direct Measure HDL  Female:  Greater than or equal to 50 mg/dL   Male:  Greater than or equal to 40 mg/dL    LDL Cholesterol  Desirable:  <100mg/dL  Above Desirable:  100-129 mg/dL   Borderline High:  130-159 mg/dL   High:  160-189 mg/dL   Very High:  >= 190 mg/dL    Non HDL Cholesterol  Desirable:  130 mg/dL  Above Desirable:  130-159 mg/dL  Borderline High:  160-189 mg/dL  High:  190-219 mg/dL  Very High:  Greater than or equal to 220 mg/dL   Albumin Random Urine Quantitative with Creat Ratio   Result Value Ref Range    Creatinine Urine mg/dL 28 mg/dL    Albumin Urine mg/L 17 mg/L    Albumin Urine mg/g Cr  60.71 (H) 0.00 - 17.00 mg/g Cr   HEMOGLOBIN A1C   Result Value Ref Range    Hemoglobin A1C 6.0 (H) 0.0 - 5.6 %      Comment:      Normal <5.7%   Prediabetes 5.7-6.4%    Diabetes 6.5% or higher     Note: Adopted from ADA consensus guidelines.   BASIC METABOLIC PANEL   Result Value Ref Range    Sodium 142 133 - 144 mmol/L    Potassium 4.7 3.4 - 5.3 mmol/L    Chloride 113 (H) 94 - 109 mmol/L    Carbon Dioxide (CO2) 24 20 - 32 mmol/L    Anion Gap 5 3 - 14 mmol/L    Urea Nitrogen 21 7 - 30 mg/dL    Creatinine 1.31 (H) 0.66 - 1.25 mg/dL    Calcium 8.5 8.5 - 10.1 mg/dL    Glucose 115 (H) 70 - 99 mg/dL    GFR Estimate 54 (L) >60 mL/min/1.73m2      Comment:      Effective December 21, 2021 eGFRcr in adults is calculated using the 2021 CKD-EPI creatinine equation which includes age and gender (Sydnie et al., NEJM, DOI: 10.1056/EMUQuy3298878)       If you have any questions or concerns, please call the clinic at the number listed above.       Sincerely,      Porfirio Burleson DO

## 2022-09-06 DIAGNOSIS — Z79.4 TYPE 2 DIABETES MELLITUS WITH STAGE 2 CHRONIC KIDNEY DISEASE, WITH LONG-TERM CURRENT USE OF INSULIN (H): ICD-10-CM

## 2022-09-06 DIAGNOSIS — N18.2 TYPE 2 DIABETES MELLITUS WITH STAGE 2 CHRONIC KIDNEY DISEASE, WITH LONG-TERM CURRENT USE OF INSULIN (H): ICD-10-CM

## 2022-09-06 DIAGNOSIS — E11.22 TYPE 2 DIABETES MELLITUS WITH STAGE 2 CHRONIC KIDNEY DISEASE, WITH LONG-TERM CURRENT USE OF INSULIN (H): ICD-10-CM

## 2022-09-09 RX ORDER — INSULIN GLARGINE 100 [IU]/ML
INJECTION, SOLUTION SUBCUTANEOUS
Qty: 30 ML | Refills: 2 | Status: SHIPPED | OUTPATIENT
Start: 2022-09-09 | End: 2022-12-22

## 2022-09-13 DIAGNOSIS — E11.22 TYPE 2 DIABETES MELLITUS WITH STAGE 2 CHRONIC KIDNEY DISEASE, WITH LONG-TERM CURRENT USE OF INSULIN (H): ICD-10-CM

## 2022-09-13 DIAGNOSIS — N18.2 TYPE 2 DIABETES MELLITUS WITH STAGE 2 CHRONIC KIDNEY DISEASE, WITH LONG-TERM CURRENT USE OF INSULIN (H): ICD-10-CM

## 2022-09-13 DIAGNOSIS — Z79.4 TYPE 2 DIABETES MELLITUS WITH STAGE 2 CHRONIC KIDNEY DISEASE, WITH LONG-TERM CURRENT USE OF INSULIN (H): ICD-10-CM

## 2022-09-16 DIAGNOSIS — L40.9 PSORIASIS: ICD-10-CM

## 2022-09-16 DIAGNOSIS — E11.8 TYPE 2 DIABETES MELLITUS WITH COMPLICATION (H): ICD-10-CM

## 2022-09-16 RX ORDER — INSULIN LISPRO 100 [IU]/ML
INJECTION, SOLUTION INTRAVENOUS; SUBCUTANEOUS
Qty: 30 ML | Refills: 2 | Status: SHIPPED | OUTPATIENT
Start: 2022-09-16 | End: 2022-12-22

## 2022-09-16 RX ORDER — HALOBETASOL PROPIONATE 0.5 MG/G
CREAM TOPICAL 2 TIMES DAILY
Qty: 50 G | Refills: 0 | Status: SHIPPED | OUTPATIENT
Start: 2022-09-16

## 2022-09-16 RX ORDER — PEN NEEDLE, DIABETIC 31 GX5/16"
NEEDLE, DISPOSABLE MISCELLANEOUS
Qty: 100 EACH | Refills: 1 | Status: SHIPPED | OUTPATIENT
Start: 2022-09-16 | End: 2023-09-20

## 2022-09-16 NOTE — TELEPHONE ENCOUNTER
"Requested Prescriptions   Pending Prescriptions Disp Refills    halobetasol (ULTRAVATE) 0.05 % external cream 50 g 0     Sig: Apply topically 2 times daily        There is no refill protocol information for this order       Signed Prescriptions Disp Refills    insulin pen needle (B-D U/F) 31G X 8 MM miscellaneous 100 each 1     Sig: Use to give insulin daily.        Diabetic Supplies Protocol Passed - 9/16/2022  8:29 AM        Passed - Medication is active on med list        Passed - Patient is 18 years of age or older        Passed - Recent (6 mo) or future (30 days) visit within the authorizing provider's specialty     Patient had office visit in the last 6 months or has a visit in the next 30 days with authorizing provider.  See \"Patient Info\" tab in inbasket, or \"Choose Columns\" in Meds & Orders section of the refill encounter.                   Padmini Davalos RN  "

## 2022-09-26 ENCOUNTER — HOSPITAL ENCOUNTER (EMERGENCY)
Facility: CLINIC | Age: 82
Discharge: HOME | End: 2022-09-26
Attending: EMERGENCY MEDICINE | Admitting: EMERGENCY MEDICINE
Payer: COMMERCIAL

## 2022-09-26 ENCOUNTER — APPOINTMENT (OUTPATIENT)
Dept: CT IMAGING | Facility: CLINIC | Age: 82
End: 2022-09-26
Attending: EMERGENCY MEDICINE
Payer: COMMERCIAL

## 2022-09-26 VITALS
BODY MASS INDEX: 34.21 KG/M2 | DIASTOLIC BLOOD PRESSURE: 77 MMHG | SYSTOLIC BLOOD PRESSURE: 161 MMHG | TEMPERATURE: 97.7 F | HEART RATE: 67 BPM | OXYGEN SATURATION: 97 % | RESPIRATION RATE: 18 BRPM | WEIGHT: 225 LBS

## 2022-09-26 DIAGNOSIS — R07.89 CHEST WALL PAIN: ICD-10-CM

## 2022-09-26 DIAGNOSIS — R91.8 PULMONARY NODULES: ICD-10-CM

## 2022-09-26 LAB
ANION GAP SERPL CALCULATED.3IONS-SCNC: 5 MMOL/L (ref 3–14)
BASOPHILS # BLD AUTO: 0 10E3/UL (ref 0–0.2)
BASOPHILS NFR BLD AUTO: 0 %
BUN SERPL-MCNC: 27 MG/DL (ref 7–30)
CALCIUM SERPL-MCNC: 9 MG/DL (ref 8.5–10.1)
CHLORIDE BLD-SCNC: 106 MMOL/L (ref 94–109)
CO2 SERPL-SCNC: 25 MMOL/L (ref 20–32)
CREAT SERPL-MCNC: 1.26 MG/DL (ref 0.66–1.25)
EOSINOPHIL # BLD AUTO: 0.1 10E3/UL (ref 0–0.7)
EOSINOPHIL NFR BLD AUTO: 1 %
ERYTHROCYTE [DISTWIDTH] IN BLOOD BY AUTOMATED COUNT: 13 % (ref 10–15)
GFR SERPL CREATININE-BSD FRML MDRD: 57 ML/MIN/1.73M2
GLUCOSE BLD-MCNC: 132 MG/DL (ref 70–99)
HCT VFR BLD AUTO: 42.9 % (ref 40–53)
HGB BLD-MCNC: 14.5 G/DL (ref 13.3–17.7)
IMM GRANULOCYTES # BLD: 0 10E3/UL
IMM GRANULOCYTES NFR BLD: 1 %
LYMPHOCYTES # BLD AUTO: 1.2 10E3/UL (ref 0.8–5.3)
LYMPHOCYTES NFR BLD AUTO: 16 %
MCH RBC QN AUTO: 32.9 PG (ref 26.5–33)
MCHC RBC AUTO-ENTMCNC: 33.8 G/DL (ref 31.5–36.5)
MCV RBC AUTO: 97 FL (ref 78–100)
MONOCYTES # BLD AUTO: 0.6 10E3/UL (ref 0–1.3)
MONOCYTES NFR BLD AUTO: 8 %
NEUTROPHILS # BLD AUTO: 5.5 10E3/UL (ref 1.6–8.3)
NEUTROPHILS NFR BLD AUTO: 74 %
NRBC # BLD AUTO: 0 10E3/UL
NRBC BLD AUTO-RTO: 0 /100
PLATELET # BLD AUTO: 149 10E3/UL (ref 150–450)
POTASSIUM BLD-SCNC: 4.6 MMOL/L (ref 3.4–5.3)
RBC # BLD AUTO: 4.41 10E6/UL (ref 4.4–5.9)
SODIUM SERPL-SCNC: 136 MMOL/L (ref 133–144)
WBC # BLD AUTO: 7.4 10E3/UL (ref 4–11)

## 2022-09-26 PROCEDURE — 99285 EMERGENCY DEPT VISIT HI MDM: CPT | Mod: 25

## 2022-09-26 PROCEDURE — 85004 AUTOMATED DIFF WBC COUNT: CPT | Performed by: EMERGENCY MEDICINE

## 2022-09-26 PROCEDURE — 250N000009 HC RX 250: Performed by: EMERGENCY MEDICINE

## 2022-09-26 PROCEDURE — 74177 CT ABD & PELVIS W/CONTRAST: CPT

## 2022-09-26 PROCEDURE — 36415 COLL VENOUS BLD VENIPUNCTURE: CPT | Performed by: EMERGENCY MEDICINE

## 2022-09-26 PROCEDURE — 99284 EMERGENCY DEPT VISIT MOD MDM: CPT | Performed by: EMERGENCY MEDICINE

## 2022-09-26 PROCEDURE — 250N000011 HC RX IP 250 OP 636: Performed by: EMERGENCY MEDICINE

## 2022-09-26 PROCEDURE — 80048 BASIC METABOLIC PNL TOTAL CA: CPT | Performed by: EMERGENCY MEDICINE

## 2022-09-26 RX ORDER — LIDOCAINE 40 MG/G
CREAM TOPICAL
Status: DISCONTINUED | OUTPATIENT
Start: 2022-09-26 | End: 2022-09-26 | Stop reason: HOSPADM

## 2022-09-26 RX ORDER — IOPAMIDOL 755 MG/ML
500 INJECTION, SOLUTION INTRAVASCULAR ONCE
Status: COMPLETED | OUTPATIENT
Start: 2022-09-26 | End: 2022-09-26

## 2022-09-26 RX ADMIN — SODIUM CHLORIDE 60 ML: 9 INJECTION, SOLUTION INTRAVENOUS at 12:20

## 2022-09-26 RX ADMIN — IOPAMIDOL 90 ML: 755 INJECTION, SOLUTION INTRAVENOUS at 12:20

## 2022-09-26 ASSESSMENT — ACTIVITIES OF DAILY LIVING (ADL)
ADLS_ACUITY_SCORE: 35
ADLS_ACUITY_SCORE: 35

## 2022-09-26 NOTE — DISCHARGE INSTRUCTIONS
No signs for any internal injuries secondary to fall yesterday.  You have aggravated to old rib fractures on the left.  This involves a #11 #12 3.  Fortunately there was no associated lung injury.  CT scan of the abdomen pelvis shows no acute injury.    Activity as tolerated.    Recommend use of incentive spirometer.  This is a device that will force to take deep breaths.  This will help reduce the risk for post chest wall injury related infection such as pneumonia.    You may use extra strength Tylenol 1000 mg every 8 hours as needed for discomfort.  You can also purchase over-the-counter Lidoderm skin patches.  These typically comes a 4% lidocaine patch.  These can be applied every 24 hours over the chest wall pain location.    The CT scan noted that she had a small pulmonary nodule on the right side.  Please talk with your primary care provider about follow-up chest imaging to make sure this area remained stable.

## 2022-09-26 NOTE — ED PROVIDER NOTES
History     Chief Complaint   Patient presents with     Fall     HPI  Pj Zhao is a 81 year old male who presents with left-sided chest and abdominal pain.  Reports that he fell approximately 3 feet yesterday when a railing gave way on a steps resulting in the fall.  I received a phone call from urgent care center in [place] recommending referral.  Chest x-ray at that facility identified rib fractures to the 11th and 12th ribs on the left with no other signs for internal chest injury.  They were concerned about some pain in the left upper quadrant so referred him for further trauma evaluation.  He arrived by private vehicle.  Patient since the fall has not felt short of breath but does state it is very painful to take a full inspiration.  There is no report of hemoptysis.  He has not self recognized any abdominal pain.  He is on aspirin therapy.  Pain currently 4/10 intensity but intensifies at the end of a deep inspiration.    Significant past medical history includes:   thrombocytopenia  DM type II  Chronic ischemic heart disease  CKD stage II  Malignant neoplasm of colon  Hypertension      Allergies:  Allergies   Allergen Reactions     No Known Drug Allergies        Problem List:    Patient Active Problem List    Diagnosis Date Noted     Thrombocytopenia (H) 07/27/2022     Priority: Medium     Intermittent claudication (H) 03/09/2021     Priority: Medium     CKD (chronic kidney disease) stage 3, GFR 30-59 ml/min (H) 10/18/2019     Priority: Medium     Vitamin B12 deficiency (non anemic) 02/01/2018     Priority: Medium     Malignant neoplasm of colon, unspecified part of colon (H), hx of 01/30/2018     Priority: Medium     Type 2 diabetes mellitus with stage 2 chronic kidney disease, with long-term current use of insulin (H) 01/06/2017     Priority: Medium     Obesity, Class I, BMI 30-34.9 10/26/2015     Priority: Medium     CKD (chronic kidney disease) stage 2, GFR 60-89 ml/min 10/26/2015     Priority:  Medium     Chronic ischemic heart disease 11/29/2014     Priority: Medium     Problem list name updated by automated process. 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, right 11/28/2014     Priority: Medium     Acute ischemic stroke- left christian 11/27/2014     Priority: Medium     DVT prophylaxis 11/27/2014     Priority: Medium     Advanced directives, counseling/discussion 11/27/2014     Priority: Medium     ED (erectile dysfunction) 10/03/2014     Priority: Medium     Hypertension goal BP (blood pressure) < 140/90 07/26/2012     Priority: Medium     Microalbuminuria 03/07/2011     Priority: Medium     Hyperlipidemia LDL goal <100 02/11/2011     Priority: Medium     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) 10/31/2010     Priority: Medium     Coronary atherosclerosis of native coronary artery 06/30/2005     Priority: Medium     History of malignant neoplasm of large intestine 03/03/2003     Priority: Medium     Problem list name updated by automated process. Provider to review          Past Medical History:    Past Medical History:   Diagnosis Date     CKD (chronic kidney disease) stage 1, GFR 90 ml/min or greater      CKD (chronic kidney disease) stage 2, GFR 60-89 ml/min 10/26/2015     Diabetic eye exam (H) 10/19/13, 11/25/14     Impotence of organic origin      Microalbuminuria 3/7/2011     NONSPECIFIC MEDICAL HISTORY 1977     Obesity, Class I, BMI 30-34.9 10/26/2015     Other specified disease of nail      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) 10/31/2010     Type II or unspecified type diabetes mellitus without mention of complication, not stated as uncontrolled       Unspecified essential hypertension      Urinary calculus, unspecified 1990       Past Surgical History:    Past Surgical History:   Procedure Laterality Date     CARDIAC SURGERY  05/2018     COLONOSCOPY  03/06/07    repeat in 3-5 yrs.     COLONOSCOPY  6/28/2011    Procedure:COMBINED COLONOSCOPY, REMOVE TUMOR/POLYP/LESION BY SNARE; Surgeon:JOANN PATEL; Location: GI     HC REMOVAL OF TONSILS,<13 Y/O      Tonsils <12y.o.     PHACOEMULSIFICATION WITH STANDARD INTRAOCULAR LENS IMPLANT Right 1/15/2015    Procedure: PHACOEMULSIFICATION WITH STANDARD INTRAOCULAR LENS IMPLANT;  Surgeon: Jamarcus Ferrer MD;  Location: [facility] OR     PHACOEMULSIFICATION WITH STANDARD INTRAOCULAR LENS IMPLANT Left 2/19/2015    Procedure: PHACOEMULSIFICATION WITH STANDARD INTRAOCULAR LENS IMPLANT;  Surgeon: Jamarcus Ferrer MD;  Location: [facility] OR     ZC APPENDECTOMY  3/11/2003     [facility] NONSPECIFIC PROCEDURE      Laser surgery for destruction of kidney stones     [facility] NONSPECIFIC PROCEDURE  3/11/2003    Lower anterior sigmoid resection with end-to-end anastomosis.         Family History:    Family History   Problem Relation Age of Onset     Diabetes Mother      Obesity Mother      Cancer Sister         Mouth       Social History:  Marital Status:   [2]  Social History     Tobacco Use     Smoking status: Never Smoker     Smokeless tobacco: Never Used   Vaping Use     Vaping Use: Never used   Substance Use Topics     Alcohol use: No     Alcohol/week: 0.0 standard drinks     Drug use: No        Medications:    aspirin (ASA) 81 MG tablet  atorvastatin (LIPITOR) 40 MG tablet  blood glucose (ONETOUCH ULTRA) test strip  furosemide (LASIX) 20 MG tablet  gabapentin (NEURONTIN) 300 MG capsule  halobetasol (ULTRAVATE) 0.05 % external cream  insulin glargine (LANTUS SOLOSTAR) 100 UNIT/ML pen  insulin lispro (HUMALOG KWIKPEN) 100 UNIT/ML (1 unit dial) KWIKPEN  insulin pen needle (B-D U/F) 31G X 8 MM miscellaneous  lisinopril (ZESTRIL)  20 MG tablet  metFORMIN (GLUCOPHAGE) 500 MG tablet  metoprolol tartrate (LOPRESSOR) 50 MG tablet  nitroGLYcerin (NITROSTAT) 0.4 MG sublingual tablet  potassium chloride ER (KLOR-CON M) 10 MEQ CR tablet          Review of Systems   All other systems reviewed and are negative.      Physical Exam   BP: (!) 161/77  Pulse: 67  Temp: 97.7  F (36.5  C)  Resp: 18  Weight: 102.1 kg (225 lb)  SpO2: 97 %      Physical Exam  Vitals and nursing note reviewed.   Constitutional:       Appearance: He is obese. He is not ill-appearing.   HENT:      Head: Normocephalic and atraumatic.      Nose: Nose normal.   Eyes:      Conjunctiva/sclera: Conjunctivae normal.      Pupils: Pupils are equal, round, and reactive to light.   Neck:      Comments: Cleared per Nexus criteria  Cardiovascular:      Rate and Rhythm: Normal rate and regular rhythm.      Pulses: Normal pulses.      Heart sounds: Normal heart sounds. No murmur heard.  Pulmonary:      Effort: Pulmonary effort is normal.      Comments: Spoke in full sentence structure  No respiratory splinting  Did complain of pain at the end of a deep inspiration.  No subcutaneous emphysema  Tender in the posterior lateral thorax around the area of the 10-12  Ribs.   Abdominal:      General: Abdomen is flat. Bowel sounds are normal. There is no distension.      Tenderness: There is no abdominal tenderness. There is left CVA tenderness.   Musculoskeletal:      Cervical back: Normal range of motion and neck supple.   Skin:     General: Skin is warm.      Capillary Refill: Capillary refill takes less than 2 seconds.      Findings: No rash.   Neurological:      General: No focal deficit present.      Mental Status: He is alert and oriented to person, place, and time.   Psychiatric:         Mood and Affect: Mood normal.         Behavior: Behavior normal.         ED Course                 Procedures                  Results for orders placed or performed during the hospital encounter of 09/26/22 (from  the past 24 hour(s))   CBC with platelets differential    Narrative    The following orders were created for panel order CBC with platelets differential.  Procedure                               Abnormality         Status                     ---------                               -----------         ------                     CBC with platelets and d...[[id number]]  Abnormal            Final result                 Please view results for these tests on the individual orders.   Basic metabolic panel   Result Value Ref Range    Sodium 136 133 - 144 mmol/L    Potassium 4.6 3.4 - 5.3 mmol/L    Chloride 106 94 - 109 mmol/L    Carbon Dioxide (CO2) 25 20 - 32 mmol/L    Anion Gap 5 3 - 14 mmol/L    Urea Nitrogen 27 7 - 30 mg/dL    Creatinine 1.26 (H) 0.66 - 1.25 mg/dL    Calcium 9.0 8.5 - 10.1 mg/dL    Glucose 132 (H) 70 - 99 mg/dL    GFR Estimate 57 (L) >60 mL/min/1.73m2   CBC with platelets and differential   Result Value Ref Range    WBC Count 7.4 4.0 - 11.0 10e3/uL    RBC Count 4.41 4.40 - 5.90 10e6/uL    Hemoglobin 14.5 13.3 - 17.7 g/dL    Hematocrit 42.9 40.0 - 53.0 %    MCV 97 78 - 100 fL    MCH 32.9 26.5 - 33.0 pg    MCHC 33.8 31.5 - 36.5 g/dL    RDW 13.0 10.0 - 15.0 %    Platelet Count 149 (L) 150 - 450 10e3/uL    % Neutrophils 74 %    % Lymphocytes 16 %    % Monocytes 8 %    % Eosinophils 1 %    % Basophils 0 %    % Immature Granulocytes 1 %    NRBCs per 100 WBC 0 <1 /100    Absolute Neutrophils 5.5 1.6 - 8.3 10e3/uL    Absolute Lymphocytes 1.2 0.8 - 5.3 10e3/uL    Absolute Monocytes 0.6 0.0 - 1.3 10e3/uL    Absolute Eosinophils 0.1 0.0 - 0.7 10e3/uL    Absolute Basophils 0.0 0.0 - 0.2 10e3/uL    Absolute Immature Granulocytes 0.0 <=0.4 10e3/uL    Absolute NRBCs 0.0 10e3/uL   CT Chest/Abdomen/Pelvis w Contrast    Narrative    CT CHEST/ABDOMEN/PELVIS W CONTRAST 9/26/2022 12:35 PM    CLINICAL HISTORY: fall 3 feet -urgent care visit today reports rib  fractures left 11???12.  Presenting with pain left upper  quadrant    TECHNIQUE: CT scan of the chest, abdomen, and pelvis was performed  following injection of IV contrast. Multiplanar reformats were  obtained. Dose reduction techniques were used.   CONTRAST: ISOVUE-370, 90 mL    COMPARISON: Chest radiograph 5/2/2018 and CT abdomen and pelvis  10/18/2009.    FINDINGS:   LUNGS AND PLEURA: Noncalcified 15 x 11 mm right upper lobe pulmonary  nodule (series 4 image 127). There are a few adjacent tiny 2 to 3 mm  clustered nodularity. Mild bibasilar atelectasis. No pleural effusion  or pneumothorax.     MEDIASTINUM/AXILLAE: Mildly enlarged right hilar lymph node measures  1.2 cm (series 2 image 68). Normal caliber aorta. Cystic 2.7 cm  posterior mediastinal mass adjacent to descending thoracic aorta  showing little change since 2009 consistent with benign etiology  (2/105).    CORONARY ARTERY CALCIFICATION: Previous intervention (stents or CABG).    HEPATOBILIARY: Normal.    PANCREAS: Normal.    SPLEEN: Normal.    ADRENAL GLANDS: Normal.    KIDNEYS/BLADDER: Small renal cyst requiring no follow-up. No  significant mass, stone or hydronephrosis. No bladder wall thickening.    BOWEL: Diverticulosis in the colon. No acute inflammatory change. No  obstruction.     PELVIC ORGANS: Mildly enlarged prostate.    ADDITIONAL FINDINGS: No lymphadenopathy. Moderate atherosclerosis. No  ascites.    MUSCULOSKELETAL: Old fractures of the left 10th-12th posterior ribs.  Chronic appearing mild L5 compression deformity. Hypertrophic changes  in the thoracic spine. Median sternotomy.      Impression    IMPRESSION:  1.  No acute traumatic finding the chest, abdomen or pelvis.  2.  Indeterminate 15 x 11 mm right upper lobe pulmonary nodule. This  could be malignant or granulomatous in etiology. Short-term follow-up  CT or PET/CT is recommended as outlined below.   3.  Mildly enlarged right hilar lymph node. Metastatic etiology in the  differential.    REFERENCE:  Guidelines for Management of  Incidental Pulmonary Nodules Detected on  CT Images: From the Fleischner Society 2017.   Guidelines apply to incidental nodules in patients who are 35 years or  older.  Guidelines do not apply to lung cancer screening, patients with  immunosuppression, or patients with known primary cancer.    SINGLE NODULE    Nodule size >8 mm  Either low or high-risk patients:  Consider CT, PET/CT, or tissue sampling at 3 months.    Consider referral to lung nodule clinic.        JUAN BARRIOS MD         SYSTEM ID:  [id number]       Medications   lidocaine 1 % 0.1-1 mL (has no administration in time range)   lidocaine (LMX4) kit (has no administration in time range)   sodium chloride (PF) 0.9% PF flush 3 mL (has no administration in time range)   sodium chloride (PF) 0.9% PF flush 3 mL (has no administration in time range)       Assessments & Plan (with Medical Decision Making)  81-year-old male presents after falling approximately 3 feet.  Presented with left rib pain.  An x-ray at a local urgent care center suggest that he had fractured his 11th and 12th rib on the left.  CT imaging of the chest abdomen pelvis today identified no acute chest injury.  Does appear to have aggravated rib pain in the region of his old 11th and 12th rib fractures.  There is no displacement to those fractures on imaging today.  No lung injury.  He was having some pain left upper quadrant but there is no subdiaphragmatic injury.  Did find an incidental pulmonary nodule in the right which will require follow-up with his primary care doctor for interval imaging.  Exercise Tylenol for discomfort along with Lidoderm skin patch.     I have reviewed the nursing notes.    I have reviewed the findings, diagnosis, plan and need for follow up with the patient.      New Prescriptions    No medications on file       Final diagnoses:   Chest wall pain - Acute pain in region of old fractures of the left 11th and 12th ribs   Pulmonary nodules       9/26/2022   M  [facility] EMERGENCY DEPT     Piotr Obregon,   09/26/22 1108

## 2022-09-26 NOTE — ED TRIAGE NOTES
Yesterday the railing on the outside deck broke and pt fell 3 steps down to the grass.  C/o left shoulder, back, abd and flank pain.  Was seen at urgent care just PTA and told he has a few broken ribs but provider wanted him to get a CT scan of his abd.      Triage Assessment     Row Name 09/26/22 1124       Triage Assessment (Adult)    Airway WDL WDL       Respiratory WDL    Respiratory WDL WDL       Skin Circulation/Temperature WDL    Skin Circulation/Temperature WDL WDL

## 2022-09-30 DIAGNOSIS — R60.1 GENERALIZED EDEMA: ICD-10-CM

## 2022-10-03 RX ORDER — FUROSEMIDE 20 MG
20 TABLET ORAL DAILY
Qty: 90 TABLET | Refills: 0 | Status: SHIPPED | OUTPATIENT
Start: 2022-10-03 | End: 2022-12-22

## 2022-10-03 NOTE — TELEPHONE ENCOUNTER
"Requested Prescriptions   Pending Prescriptions Disp Refills    furosemide (LASIX) 20 MG tablet 90 tablet 0     Sig: Take 1 tablet (20 mg) by mouth daily        Diuretics (Including Combos) Protocol Failed - 9/30/2022  3:47 PM        Failed - Blood pressure under 140/90 in past 12 months       BP Readings from Last 3 Encounters:   09/26/22 (!) 161/77   07/27/22 130/84   06/18/21 122/68                 Failed - Normal serum creatinine on file in past 12 months       Recent Labs   Lab Test 09/26/22  1146   CR 1.26*              Passed - Recent (12 mo) or future (30 days) visit within the authorizing provider's specialty     Patient has had an office visit with the authorizing provider or a provider within the authorizing providers department within the previous 12 mos or has a future within next 30 days. See \"Patient Info\" tab in inbasket, or \"Choose Columns\" in Meds & Orders section of the refill encounter.              Passed - Medication is active on med list        Passed - Patient is age 18 or older        Passed - Normal serum potassium on file in past 12 months       Recent Labs   Lab Test 09/26/22  1146   POTASSIUM 4.6                    Passed - Normal serum sodium on file in past 12 months       Recent Labs   Lab Test 09/26/22  1146                         CHICO AndreN, RN          "

## 2022-10-12 DIAGNOSIS — E11.22 TYPE 2 DIABETES MELLITUS WITH STAGE 2 CHRONIC KIDNEY DISEASE, WITHOUT LONG-TERM CURRENT USE OF INSULIN (H): ICD-10-CM

## 2022-10-12 DIAGNOSIS — N18.2 TYPE 2 DIABETES MELLITUS WITH STAGE 2 CHRONIC KIDNEY DISEASE, WITHOUT LONG-TERM CURRENT USE OF INSULIN (H): ICD-10-CM

## 2022-10-12 DIAGNOSIS — I10 HYPERTENSION GOAL BP (BLOOD PRESSURE) < 140/90: ICD-10-CM

## 2022-10-12 DIAGNOSIS — R60.1 GENERALIZED EDEMA: ICD-10-CM

## 2022-10-15 RX ORDER — POTASSIUM CHLORIDE 750 MG/1
10 TABLET, EXTENDED RELEASE ORAL DAILY
Qty: 90 TABLET | Refills: 0 | Status: SHIPPED | OUTPATIENT
Start: 2022-10-15 | End: 2022-12-22

## 2022-10-15 NOTE — TELEPHONE ENCOUNTER
Prescription approved per [facility] Refill Protocol.   Amos Monge RN

## 2022-10-17 RX ORDER — METOPROLOL TARTRATE 50 MG
50 TABLET ORAL 2 TIMES DAILY
Qty: 180 TABLET | Refills: 0 | Status: SHIPPED | OUTPATIENT
Start: 2022-10-17 | End: 2022-12-22

## 2022-11-22 ENCOUNTER — TELEPHONE (OUTPATIENT)
Dept: INTERNAL MEDICINE | Facility: CLINIC | Age: 82
End: 2022-11-22

## 2022-11-22 NOTE — TELEPHONE ENCOUNTER
Patient calling stating Diane with [employer] has requested PCP sign off on a new pair of diabetic shoes. Patient stated Diane has sent a request but RN did not note any encounter for this.     Did team receive a fax for this? If not, can call Diane at [phone] for additional info.     CHICO HumphreysN, RN

## 2022-11-23 DIAGNOSIS — E11.22 TYPE 2 DIABETES MELLITUS WITH STAGE 2 CHRONIC KIDNEY DISEASE, WITHOUT LONG-TERM CURRENT USE OF INSULIN (H): ICD-10-CM

## 2022-11-23 DIAGNOSIS — N18.2 TYPE 2 DIABETES MELLITUS WITH STAGE 2 CHRONIC KIDNEY DISEASE, WITHOUT LONG-TERM CURRENT USE OF INSULIN (H): ICD-10-CM

## 2022-11-25 RX ORDER — LISINOPRIL 20 MG/1
20 TABLET ORAL 2 TIMES DAILY
Qty: 180 TABLET | Refills: 0 | Status: SHIPPED | OUTPATIENT
Start: 2022-11-25 | End: 2022-12-22

## 2022-11-25 NOTE — TELEPHONE ENCOUNTER
"Requested Prescriptions   Pending Prescriptions Disp Refills    lisinopril (ZESTRIL) 20 MG tablet 180 tablet 1     Sig: Take 1 tablet (20 mg) by mouth 2 times daily       ACE Inhibitors (Including Combos) Protocol Failed - 11/23/2022  8:37 AM        Failed - Blood pressure under 140/90 in past 12 months     BP Readings from Last 3 Encounters:   09/26/22 (!) 161/77   07/27/22 130/84   06/18/21 122/68                 Failed - Normal serum creatinine on file in past 12 months     Recent Labs   Lab Test 09/26/22  1146   CR 1.26*       Ok to refill medication if creatinine is low          Passed - Recent (12 mo) or future (30 days) visit within the authorizing provider's specialty     Patient has had an office visit with the authorizing provider or a provider within the authorizing providers department within the previous 12 mos or has a future within next 30 days. See \"Patient Info\" tab in inbasket, or \"Choose Columns\" in Meds & Orders section of the refill encounter.              Passed - Medication is active on med list        Passed - Patient is age 18 or older        Passed - Normal serum potassium on file in past 12 months     Recent Labs   Lab Test 09/26/22  1146   POTASSIUM 4.6                    THOMAS Andre, RN          "

## 2022-12-22 ENCOUNTER — OFFICE VISIT (OUTPATIENT)
Dept: INTERNAL MEDICINE | Facility: CLINIC | Age: 82
End: 2022-12-22
Payer: COMMERCIAL

## 2022-12-22 VITALS
OXYGEN SATURATION: 97 % | DIASTOLIC BLOOD PRESSURE: 74 MMHG | TEMPERATURE: 97.1 F | RESPIRATION RATE: 18 BRPM | BODY MASS INDEX: 34.19 KG/M2 | HEART RATE: 60 BPM | HEIGHT: 68 IN | WEIGHT: 225.6 LBS | SYSTOLIC BLOOD PRESSURE: 136 MMHG

## 2022-12-22 DIAGNOSIS — E11.22 TYPE 2 DIABETES MELLITUS WITH STAGE 2 CHRONIC KIDNEY DISEASE, WITH LONG-TERM CURRENT USE OF INSULIN (H): ICD-10-CM

## 2022-12-22 DIAGNOSIS — F33.42 RECURRENT MAJOR DEPRESSIVE DISORDER, IN FULL REMISSION (H): Primary | ICD-10-CM

## 2022-12-22 DIAGNOSIS — Z79.4 TYPE 2 DIABETES MELLITUS WITH STAGE 2 CHRONIC KIDNEY DISEASE, WITH LONG-TERM CURRENT USE OF INSULIN (H): ICD-10-CM

## 2022-12-22 DIAGNOSIS — E78.5 HYPERLIPIDEMIA LDL GOAL <100: ICD-10-CM

## 2022-12-22 DIAGNOSIS — Z12.11 SCREEN FOR COLON CANCER: ICD-10-CM

## 2022-12-22 DIAGNOSIS — R60.1 GENERALIZED EDEMA: ICD-10-CM

## 2022-12-22 DIAGNOSIS — I10 HYPERTENSION GOAL BP (BLOOD PRESSURE) < 140/90: ICD-10-CM

## 2022-12-22 DIAGNOSIS — N18.2 TYPE 2 DIABETES MELLITUS WITH STAGE 2 CHRONIC KIDNEY DISEASE, WITH LONG-TERM CURRENT USE OF INSULIN (H): ICD-10-CM

## 2022-12-22 LAB
ALBUMIN UR-MCNC: 30 MG/DL
ANION GAP SERPL CALCULATED.3IONS-SCNC: 5 MMOL/L (ref 3–14)
APPEARANCE UR: CLEAR
BILIRUB UR QL STRIP: NEGATIVE
BUN SERPL-MCNC: 19 MG/DL (ref 7–30)
CALCIUM SERPL-MCNC: 8 MG/DL (ref 8.5–10.1)
CHLORIDE BLD-SCNC: 110 MMOL/L (ref 94–109)
CHOLEST SERPL-MCNC: 109 MG/DL
CO2 SERPL-SCNC: 29 MMOL/L (ref 20–32)
COLOR UR AUTO: YELLOW
CREAT SERPL-MCNC: 1.33 MG/DL (ref 0.66–1.25)
ERYTHROCYTE [DISTWIDTH] IN BLOOD BY AUTOMATED COUNT: 12.6 % (ref 10–15)
FASTING STATUS PATIENT QL REPORTED: YES
GFR SERPL CREATININE-BSD FRML MDRD: 53 ML/MIN/1.73M2
GLUCOSE BLD-MCNC: 112 MG/DL (ref 70–99)
GLUCOSE UR STRIP-MCNC: NEGATIVE MG/DL
HBA1C MFR BLD: 6.2 % (ref 0–5.6)
HCT VFR BLD AUTO: 42.2 % (ref 40–53)
HDLC SERPL-MCNC: 34 MG/DL
HGB BLD-MCNC: 13.8 G/DL (ref 13.3–17.7)
HGB UR QL STRIP: NEGATIVE
KETONES UR STRIP-MCNC: NEGATIVE MG/DL
LDLC SERPL CALC-MCNC: 49 MG/DL
LEUKOCYTE ESTERASE UR QL STRIP: NEGATIVE
MCH RBC QN AUTO: 32.3 PG (ref 26.5–33)
MCHC RBC AUTO-ENTMCNC: 32.7 G/DL (ref 31.5–36.5)
MCV RBC AUTO: 99 FL (ref 78–100)
MUCOUS THREADS #/AREA URNS LPF: PRESENT /LPF
NITRATE UR QL: NEGATIVE
NONHDLC SERPL-MCNC: 75 MG/DL
PH UR STRIP: 7.5 [PH] (ref 5–7)
PLATELET # BLD AUTO: 147 10E3/UL (ref 150–450)
POTASSIUM BLD-SCNC: 4.1 MMOL/L (ref 3.4–5.3)
RBC # BLD AUTO: 4.27 10E6/UL (ref 4.4–5.9)
RBC URINE: 0 /HPF
SODIUM SERPL-SCNC: 144 MMOL/L (ref 133–144)
SP GR UR STRIP: 1.02 (ref 1–1.03)
TRIGL SERPL-MCNC: 131 MG/DL
UROBILINOGEN UR STRIP-MCNC: NORMAL MG/DL
WBC # BLD AUTO: 5.3 10E3/UL (ref 4–11)
WBC URINE: 0 /HPF

## 2022-12-22 PROCEDURE — 99214 OFFICE O/P EST MOD 30 MIN: CPT | Performed by: INTERNAL MEDICINE

## 2022-12-22 PROCEDURE — 80061 LIPID PANEL: CPT | Performed by: INTERNAL MEDICINE

## 2022-12-22 PROCEDURE — 80048 BASIC METABOLIC PNL TOTAL CA: CPT | Performed by: INTERNAL MEDICINE

## 2022-12-22 PROCEDURE — 81001 URINALYSIS AUTO W/SCOPE: CPT | Performed by: INTERNAL MEDICINE

## 2022-12-22 PROCEDURE — 83036 HEMOGLOBIN GLYCOSYLATED A1C: CPT | Performed by: INTERNAL MEDICINE

## 2022-12-22 PROCEDURE — 36415 COLL VENOUS BLD VENIPUNCTURE: CPT | Performed by: INTERNAL MEDICINE

## 2022-12-22 PROCEDURE — 85027 COMPLETE CBC AUTOMATED: CPT | Performed by: INTERNAL MEDICINE

## 2022-12-22 RX ORDER — GABAPENTIN 300 MG/1
600 CAPSULE ORAL AT BEDTIME
Qty: 180 CAPSULE | Refills: 3 | Status: SHIPPED | OUTPATIENT
Start: 2022-12-22 | End: 2024-01-24

## 2022-12-22 RX ORDER — LISINOPRIL 20 MG/1
20 TABLET ORAL 2 TIMES DAILY
Qty: 180 TABLET | Refills: 3 | Status: SHIPPED | OUTPATIENT
Start: 2022-12-22 | End: 2024-02-26

## 2022-12-22 RX ORDER — INSULIN GLARGINE 100 [IU]/ML
35 INJECTION, SOLUTION SUBCUTANEOUS AT BEDTIME
Qty: 30 ML | Refills: 3 | Status: SHIPPED | OUTPATIENT
Start: 2022-12-22 | End: 2024-02-28

## 2022-12-22 RX ORDER — METOPROLOL TARTRATE 50 MG
50 TABLET ORAL 2 TIMES DAILY
Qty: 180 TABLET | Refills: 3 | Status: SHIPPED | OUTPATIENT
Start: 2022-12-22

## 2022-12-22 RX ORDER — POTASSIUM CHLORIDE 750 MG/1
10 TABLET, EXTENDED RELEASE ORAL DAILY
Qty: 90 TABLET | Refills: 3 | Status: SHIPPED | OUTPATIENT
Start: 2022-12-22 | End: 2023-10-19

## 2022-12-22 RX ORDER — INSULIN LISPRO 100 [IU]/ML
18 INJECTION, SOLUTION INTRAVENOUS; SUBCUTANEOUS
Qty: 30 ML | Refills: 3 | Status: SHIPPED | OUTPATIENT
Start: 2022-12-22 | End: 2023-10-19

## 2022-12-22 RX ORDER — NITROGLYCERIN 0.4 MG/1
TABLET SUBLINGUAL
Qty: 25 TABLET | Refills: 0 | Status: SHIPPED | OUTPATIENT
Start: 2022-12-22

## 2022-12-22 RX ORDER — FUROSEMIDE 20 MG
20 TABLET ORAL DAILY
Qty: 90 TABLET | Refills: 3 | Status: SHIPPED | OUTPATIENT
Start: 2022-12-22 | End: 2023-10-19

## 2022-12-22 RX ORDER — ATORVASTATIN CALCIUM 40 MG/1
40 TABLET, FILM COATED ORAL
Qty: 90 TABLET | Refills: 3 | Status: SHIPPED | OUTPATIENT
Start: 2022-12-22 | End: 2024-01-24

## 2022-12-22 ASSESSMENT — ANXIETY QUESTIONNAIRES
7. FEELING AFRAID AS IF SOMETHING AWFUL MIGHT HAPPEN: NOT AT ALL
6. BECOMING EASILY ANNOYED OR IRRITABLE: NOT AT ALL
4. TROUBLE RELAXING: NOT AT ALL
3. WORRYING TOO MUCH ABOUT DIFFERENT THINGS: NOT AT ALL
GAD7 TOTAL SCORE: 1
5. BEING SO RESTLESS THAT IT IS HARD TO SIT STILL: NOT AT ALL
1. FEELING NERVOUS, ANXIOUS, OR ON EDGE: SEVERAL DAYS
IF YOU CHECKED OFF ANY PROBLEMS ON THIS QUESTIONNAIRE, HOW DIFFICULT HAVE THESE PROBLEMS MADE IT FOR YOU TO DO YOUR WORK, TAKE CARE OF THINGS AT HOME, OR GET ALONG WITH OTHER PEOPLE: SOMEWHAT DIFFICULT
GAD7 TOTAL SCORE: 1
GAD7 TOTAL SCORE: 1
2. NOT BEING ABLE TO STOP OR CONTROL WORRYING: NOT AT ALL
7. FEELING AFRAID AS IF SOMETHING AWFUL MIGHT HAPPEN: NOT AT ALL
8. IF YOU CHECKED OFF ANY PROBLEMS, HOW DIFFICULT HAVE THESE MADE IT FOR YOU TO DO YOUR WORK, TAKE CARE OF THINGS AT HOME, OR GET ALONG WITH OTHER PEOPLE?: SOMEWHAT DIFFICULT

## 2022-12-22 ASSESSMENT — PAIN SCALES - GENERAL: PAINLEVEL: NO PAIN (0)

## 2022-12-22 NOTE — PROGRESS NOTES
"      Cece Oliva is a 82 year old, presenting for the following health issues:  Diabetes      History of Present Illness       Mental Health Follow-up:  Patient presents to follow-up on Anxiety.    Patient's anxiety since last visit has been:  Medium  The patient is not having other symptoms associated with anxiety.  Any significant life events: No  Patient is not feeling anxious or having panic attacks.  Patient has no concerns about alcohol or drug use.    Diabetes:   He presents for follow up of diabetes.  He is checking home blood glucose three times daily. He checks blood glucose before meals and before and after meals.  Blood glucose is never over 200 and never under 70. He is aware of hypoglycemia symptoms including weakness and blurred vision. He has no concerns regarding his diabetes at this time.  He is having numbness in feet. The patient has had a diabetic eye exam in the last 12 months.         Heart Failure:  He presents for follow up of heart failure. He is experiencing shortness of breath with activity only, which is same as usual. He is experiencing lower extremity edema which is same as usual. He denies orthopenea and is not coughing at night. Patient is not checking weight daily. He has recently had a None. He has no side effects from medications.  He has had no other medical visits for heart failure since the last visit.    Hypertension: He presents for follow up of hypertension.  He does not check blood pressure  regularly outside of the clinic. Outside blood pressures have been over 140/90. He follows a low salt diet.     Vascular Disease:  He presents for follow up of vascular disease.  He takes daily aspirin.   Today's SHAY-7 Score: 1        EMR reviewed including:             Complaint, History of Chief Complaint, Corresponding Review of Systems, and Complaint Specific Physical Examination.    #1   Patient comes in today \"to get my pills refilled\".      #2   Follow-up on type 2 " diabetes  Taking insulin compliantly.  Blood sugars generally under 150.  Checks blood sugar 4 times daily.  Disgruntled with cost of insulin.  Denies hypoglycemic episodes.  Denies polyuria or polydipsia.        Exam:   NEURO: Pt is alert and appropriate. No neurologic lateralization is noted. Cranial nerves 2-12 are intact. Peripheral sensory and motor function are grossly normal.    SKIN:  warm and dry. No erythema, or rashes are noted. No specific lesions of concern are noted.       #2  Follow-up on chronic heart failure.  Denies edema.  Denies orthopnea.  Notes he is able to maintain his normal activities of living without significant dyspnea or chest pain.  Has not used nitroglycerin.  Status post previous MI with subsequent stenting in 2004.        Exam:   HEART:  regular without rubs, clicks, gallops, or murmurs. PMI is nondisplaced. Upstrokes are brisk. S1,S2 are heard.   LUNGS: clear bilaterally, airflow is brisk, no intercostal retraction or stridor is noted. No coughing is noted during visit.      #3   Follow-up on hypertension  Taking medication compliantly  No side effects from the medication.  Denies lightheadedness or near syncope.        Exam:  As above      #4   Follow-up on depression  Notes that anxiety and depression have now resolved.  Eating well, sleeping well.  Denies weight loss.  Denies withdrawn from his normal activities of enjoyment and family.        Exam:   Constitutional: The patient appears to be in no acute distress. The patient appears to be adequately hydrated. No acute respiratory or hemodynamic distress is noted at this time.   PSYCH: The patient appears grossly appropriate. Maintains good eye contact, does not have any jittery or atypical motion. Displays appropriate affect.        Patient has been interviewed, applicable history and applied review of systems have been performed.    Vital Signs:   /74 (BP Location: Right arm, Patient Position: Sitting, Cuff Size: Adult  "Large)   Pulse 60   Temp 97.1  F (36.2  C) (Temporal)   Resp 18   Ht 1.727 m (5' 8\")   Wt 102.3 kg (225 lb 9.6 oz)   SpO2 97%   BMI 34.30 kg/m        Decision Making    Problem and Complexity     1. Type 2 diabetes mellitus with stage 2 chronic kidney disease, with long-term current use of insulin (H)  Recommend regular ocular examinations.  Patient does have some neuropathy, notes that it is fairly well controlled to gabapentin.  Received note from his insurance company that price of gabapentin was going up, patient will decide if he wishes to continue.    - Adult Eye  Referral; Future  - gabapentin (NEURONTIN) 300 MG capsule; Take 2 capsules (600 mg) by mouth At Bedtime  Dispense: 180 capsule; Refill: 3  - insulin glargine (LANTUS SOLOSTAR) 100 UNIT/ML pen; Inject 35 Units Subcutaneous At Bedtime  Dispense: 30 mL; Refill: 3  - insulin lispro (HUMALOG KWIKPEN) 100 UNIT/ML (1 unit dial) KWIKPEN; Inject 18 Units Subcutaneous 3 times daily (before meals) INJECT 16 UNITS SUBCUTANEOUSLY BEFORE BREAKFAST, 18 UNITS BEFORE LUNCH AND 18 UNITS BEFORE DINNER, PLUS SLIDING SCALE AS DIRECTED * MAX OF 70 UNITS DAILY *  Dispense: 30 mL; Refill: 3  - Hemoglobin A1c; Future  - Basic metabolic panel  (Ca, Cl, CO2, Creat, Gluc, K, Na, BUN); Future  - UA Macro with Reflex to Micro and Culture - lab collect; Future  - CBC with platelets; Future  - CBC with platelets  - UA Macro with Reflex to Micro and Culture - lab collect  - Basic metabolic panel  (Ca, Cl, CO2, Creat, Gluc, K, Na, BUN)  - Hemoglobin A1c    2. Hyperlipidemia LDL goal <100  Continue statin, adjust dosage based on results.  Check liver functions/transaminase levels  - atorvastatin (LIPITOR) 40 MG tablet; Take 1 tablet (40 mg) by mouth daily (with dinner)  Dispense: 90 tablet; Refill: 3  - Lipid panel reflex to direct LDL Fasting; Future  - Lipid panel reflex to direct LDL Fasting    3. Generalized edema  Continue diuretic uses sparingly.  Follow-up " potassium and renal function  - furosemide (LASIX) 20 MG tablet; Take 1 tablet (20 mg) by mouth daily  Dispense: 90 tablet; Refill: 3  - potassium chloride ER (KLOR-CON M) 10 MEQ CR tablet; Take 1 tablet (10 mEq) by mouth daily  Dispense: 90 tablet; Refill: 3    4. Hypertension goal BP (blood pressure) < 140/90  Continue current medications.  - metoprolol tartrate (LOPRESSOR) 50 MG tablet; Take 1 tablet (50 mg) by mouth 2 times daily  Dispense: 180 tablet; Refill: 3    5. Recurrent major depressive disorder, in full remission (H)  Resolved.    6. Screen for colon cancer  Patient wishes to forego further colonoscopy despite his history of having malignancy.                                FOLLOW UP   I have asked the patient to make an appointment for followup with either:  1.  Me,  2.  The patient's preferred provider,  3.  Any available provider  In 4 months for follow-up of diabetes and hypertension        Regarding routine vaccinations:  I have reviewed the patient's vaccination schedule and discussed the benefits of prophylactic vaccination in detail.  I recommend the patient contact their pharmacist (not the pharmacy within the clinic) for vaccinations.  Discussed that most insurance companies now favor reimbursement to the pharmacies and it will financially behoove the patient to have vaccinations performed at their pharmacy.        I have carefully explained the diagnosis and treatment options to the patient.  The patient has displayed an understanding of the above, and all subsequent questions were answered.      DO CAREN Phillips    Portions of this note were produced using evocatal  Although every attempt at real-time proof reading has been made, occasional grammar/syntax errors may have been missed.

## 2022-12-22 NOTE — LETTER
January 3, 2023      Pj Zhao  [address] [facility] [address]        Dear ,    We are writing to inform you of your test results.      Urinary analysis shows no outstanding irregularities.   The glycosylated hemoglobin is within acceptable limits suggesting adequate blood sugar control.   The cholesterol is within acceptable limits.   Chemistry panel shows a slightly elevated blood sugar of 112 and  slightly decreased but stable kidney function.   Blood cell count is within acceptable limits.  There is no evidence of anemia or leukemia.       You will be contacted with any outstanding results as they are available.   Feel free to contact me via the office or My Chart if you have any questions regarding the above.       Resulted Orders   Lipid panel reflex to direct LDL Fasting   Result Value Ref Range    Cholesterol 109 <200 mg/dL    Triglycerides 131 <150 mg/dL    Direct Measure HDL 34 (L) >=40 mg/dL    LDL Cholesterol Calculated 49 <=100 mg/dL    Non HDL Cholesterol 75 <130 mg/dL    Patient Fasting > 8hrs? Yes     Narrative    Cholesterol  Desirable:  <200 mg/dL    Triglycerides  Normal:  Less than 150 mg/dL  Borderline High:  150-199 mg/dL  High:  200-499 mg/dL  Very High:  Greater than or equal to 500 mg/dL    Direct Measure HDL  Female:  Greater than or equal to 50 mg/dL   Male:  Greater than or equal to 40 mg/dL    LDL Cholesterol  Desirable:  <100mg/dL  Above Desirable:  100-129 mg/dL   Borderline High:  130-159 mg/dL   High:  160-189 mg/dL   Very High:  >= 190 mg/dL    Non HDL Cholesterol  Desirable:  130 mg/dL  Above Desirable:  130-159 mg/dL  Borderline High:  160-189 mg/dL  High:  190-219 mg/dL  Very High:  Greater than or equal to 220 mg/dL   CBC with platelets   Result Value Ref Range    WBC Count 5.3 4.0 - 11.0 10e3/uL    RBC Count 4.27 (L) 4.40 - 5.90 10e6/uL    Hemoglobin 13.8 13.3 - 17.7 g/dL    Hematocrit 42.2 40.0 - 53.0 %    MCV 99 78 - 100 fL    MCH 32.3 26.5 - 33.0 pg     MCHC 32.7 31.5 - 36.5 g/dL    RDW 12.6 10.0 - 15.0 %    Platelet Count 147 (L) 150 - 450 10e3/uL   UA Macro with Reflex to Micro and Culture - lab collect   Result Value Ref Range    Color Urine Yellow Colorless, Straw, Light Yellow, Yellow    Appearance Urine Clear Clear    Glucose Urine Negative Negative mg/dL    Bilirubin Urine Negative Negative    Ketones Urine Negative Negative mg/dL    Specific Gravity Urine 1.020 1.003 - 1.035    Blood Urine Negative Negative    pH Urine 7.5 (H) 5.0 - 7.0    Protein Albumin Urine 30 (A) Negative mg/dL    Urobilinogen Urine Normal Normal, 2.0 mg/dL    Nitrite Urine Negative Negative    Leukocyte Esterase Urine Negative Negative    Mucus Urine Present (A) None Seen /LPF    RBC Urine 0 <=2 /HPF    WBC Urine 0 <=5 /HPF    Narrative    Urine Culture not indicated   Basic metabolic panel  (Ca, Cl, CO2, Creat, Gluc, K, Na, BUN)   Result Value Ref Range    Sodium 144 133 - 144 mmol/L    Potassium 4.1 3.4 - 5.3 mmol/L    Chloride 110 (H) 94 - 109 mmol/L    Carbon Dioxide (CO2) 29 20 - 32 mmol/L    Anion Gap 5 3 - 14 mmol/L    Urea Nitrogen 19 7 - 30 mg/dL    Creatinine 1.33 (H) 0.66 - 1.25 mg/dL    Calcium 8.0 (L) 8.5 - 10.1 mg/dL    Glucose 112 (H) 70 - 99 mg/dL    GFR Estimate 53 (L) >60 mL/min/1.73m2      Comment:      Effective December 21, 2021 eGFRcr in adults is calculated using the 2021 CKD-EPI creatinine equation which includes age and gender (Sydnie et al., NEJ, DOI: 10.1056/COOVoo5856404)   Hemoglobin A1c   Result Value Ref Range    Hemoglobin A1C 6.2 (H) 0.0 - 5.6 %      Comment:      Normal <5.7%   Prediabetes 5.7-6.4%    Diabetes 6.5% or higher     Note: Adopted from ADA consensus guidelines.       If you have any questions or concerns, please call the clinic at the number listed above.       Sincerely,      Porfirio Burleson DO

## 2023-02-02 ENCOUNTER — TELEPHONE (OUTPATIENT)
Dept: INTERNAL MEDICINE | Facility: CLINIC | Age: 83
End: 2023-02-02
Payer: COMMERCIAL

## 2023-02-02 DIAGNOSIS — H81.13 BENIGN PAROXYSMAL POSITIONAL VERTIGO DUE TO BILATERAL VESTIBULAR DISORDER: Primary | ICD-10-CM

## 2023-02-02 RX ORDER — MECLIZINE HYDROCHLORIDE 25 MG/1
25 TABLET ORAL 3 TIMES DAILY PRN
Qty: 25 TABLET | Refills: 0 | Status: SHIPPED | OUTPATIENT
Start: 2023-02-02 | End: 2023-05-10

## 2023-02-02 NOTE — TELEPHONE ENCOUNTER
General Call  Patient requesting refill of meclizine (ANTIVERT) 25 MG tablet.     Contacts       Type Contact Phone/Fax    02/02/2023 03:04 PM CST Phone (Incoming) Pj Zhao (Self)         Reason for Call: prescription of meclizine (ANTIVERT) 25 MG tablet       Date of last appointment with provider: 12/22/22    Okay to leave a detailed message?: Yes at Home number on file [phone] (home) or Cell number on file:    Telephone Information:   Mobile [phone]

## 2023-04-25 DIAGNOSIS — Z79.4 TYPE 2 DIABETES MELLITUS WITH STAGE 2 CHRONIC KIDNEY DISEASE, WITH LONG-TERM CURRENT USE OF INSULIN (H): ICD-10-CM

## 2023-04-25 DIAGNOSIS — E11.22 TYPE 2 DIABETES MELLITUS WITH STAGE 2 CHRONIC KIDNEY DISEASE, WITH LONG-TERM CURRENT USE OF INSULIN (H): ICD-10-CM

## 2023-04-25 DIAGNOSIS — N18.2 TYPE 2 DIABETES MELLITUS WITH STAGE 2 CHRONIC KIDNEY DISEASE, WITH LONG-TERM CURRENT USE OF INSULIN (H): ICD-10-CM

## 2023-04-27 ENCOUNTER — TELEPHONE (OUTPATIENT)
Dept: INTERNAL MEDICINE | Facility: CLINIC | Age: 83
End: 2023-04-27
Payer: COMMERCIAL

## 2023-04-27 DIAGNOSIS — H81.13 BENIGN PAROXYSMAL POSITIONAL VERTIGO DUE TO BILATERAL VESTIBULAR DISORDER: ICD-10-CM

## 2023-04-27 RX ORDER — BLOOD SUGAR DIAGNOSTIC
STRIP MISCELLANEOUS
Qty: 100 STRIP | Refills: 5 | Status: SHIPPED | OUTPATIENT
Start: 2023-04-27 | End: 2024-01-02

## 2023-04-27 NOTE — TELEPHONE ENCOUNTER
"Requested Prescriptions   Pending Prescriptions Disp Refills    blood glucose (ONETOUCH ULTRA) test strip 100 strip 5     Sig: USE TO TEST FOUR TIMES A DAY       Diabetic Supplies Protocol Passed - 4/27/2023  2:05 PM        Passed - Medication is active on med list        Passed - Patient is 18 years of age or older        Passed - Recent (6 mo) or future (30 days) visit within the authorizing provider's specialty     Patient had office visit in the last 6 months or has a visit in the next 30 days with authorizing provider.  See \"Patient Info\" tab in inbasket, or \"Choose Columns\" in Meds & Orders section of the refill encounter.                 "

## 2023-04-27 NOTE — TELEPHONE ENCOUNTER
Negative.    The use of 5 mg of Valium in an 82-year-old gentleman is contraindicated.  (It tends to make elders confused, fall down.)    If you would like to set up an appointment to discuss alternate forms of anxiety management we can certainly do that.    Benjy

## 2023-04-27 NOTE — TELEPHONE ENCOUNTER
New Medication Request      Patient would like to back ondiazepam (VALIUM) 5 MG tablet     Contacts       Type Contact Phone/Fax    04/27/2023 02:07 PM CDT Phone (Incoming) Pj Zhao (Self) [phone] (M)          What medication are you requesting?: diazepam (VALIUM) 5 MG tablet      Have you taken this medication before?: Yes:     Controlled Substance Agreement on file:   CSA -- Patient Level:    CSA: None found at the patient level.         Patient offered an appointment? Yes: he didn't want to make one at this time.    Preferred Pharmacy:       [facility] - [place], [place] - [address]  [address]  [facility] [address]  Phone: [phone] Fax: [phone]      Okay to leave a detailed message?: Yes at Home number on file [phone] (home)

## 2023-05-09 NOTE — TELEPHONE ENCOUNTER
Patient does NOT want the valium, he would like the meclizine (ANTIVERT) 25 MG tablet refilled.    Madhavi Davenport XRO/

## 2023-05-10 RX ORDER — MECLIZINE HYDROCHLORIDE 25 MG/1
25 TABLET ORAL 3 TIMES DAILY PRN
Qty: 25 TABLET | Refills: 1 | Status: SHIPPED | OUTPATIENT
Start: 2023-05-10 | End: 2023-06-27

## 2023-06-27 DIAGNOSIS — H81.13 BENIGN PAROXYSMAL POSITIONAL VERTIGO DUE TO BILATERAL VESTIBULAR DISORDER: ICD-10-CM

## 2023-06-27 RX ORDER — MECLIZINE HYDROCHLORIDE 25 MG/1
25 TABLET ORAL 3 TIMES DAILY PRN
Qty: 25 TABLET | Refills: 3 | Status: SHIPPED | OUTPATIENT
Start: 2023-06-27 | End: 2023-10-02

## 2023-06-27 NOTE — TELEPHONE ENCOUNTER
Prescription approved per [facility] Refill Protocol.  Karli Helms RN on 6/27/2023 at 3:49 PM

## 2023-07-13 ENCOUNTER — TELEPHONE (OUTPATIENT)
Dept: INTERNAL MEDICINE | Facility: CLINIC | Age: 83
End: 2023-07-13
Payer: COMMERCIAL

## 2023-07-13 DIAGNOSIS — N18.2 CHRONIC KIDNEY DISEASE, STAGE II (MILD): Primary | ICD-10-CM

## 2023-07-13 DIAGNOSIS — I77.810 ASCENDING AORTA DILATION (H): Primary | ICD-10-CM

## 2023-07-13 DIAGNOSIS — I35.1 MODERATE AORTIC REGURGITATION: ICD-10-CM

## 2023-07-13 DIAGNOSIS — Z95.1 S/P CABG (CORONARY ARTERY BYPASS GRAFT): ICD-10-CM

## 2023-07-13 NOTE — TELEPHONE ENCOUNTER
FYI - Status Update    Who is Calling: Janeen Nurse Practitioner, [facility]      Update: Patient is being seen and had a recent ECHO completed which showed patient has a dilated Aortic that she is following up on.  She is also wanting to give primary an update that patient had abnormal CT findings from the ED back in September of 2022 of a noted pulmonary nodule/mass.    Janeen is sending orders over for patient to complete a CTA of Chest and was wanting primary to know of this incase further follow up is needed if patient continues to have noted pulmonary nodule/mass. Janeen will also be sending provider over office visit notes and findings.      Does caller want a call/response back: No, but if Dr. Burleson has any questions he can return call to Janeen at [phone]

## 2023-07-13 NOTE — PROGRESS NOTES
Faxed echocardiogram order received from [facility] signed by Janeen Kc CNP.

## 2023-07-20 ENCOUNTER — TELEPHONE (OUTPATIENT)
Dept: INTERNAL MEDICINE | Facility: CLINIC | Age: 83
End: 2023-07-20
Payer: COMMERCIAL

## 2023-07-20 NOTE — TELEPHONE ENCOUNTER
Patient is having an Angiogram on August 11th.  He is wondering if he needs a preop or any lab work before this?    He would like more information. His information from [place] says Ct chest Angiogram.    Please advise if you have been notified of him having this test.    Karli Helms RN on 7/20/2023 at 12:58 PM

## 2023-07-20 NOTE — TELEPHONE ENCOUNTER
He should ask his cardiologist in [place].  I did not order this test.  I am unfamiliar with their protocols.    Benjy

## 2023-08-11 ENCOUNTER — HOSPITAL ENCOUNTER (OUTPATIENT)
Dept: CARDIOLOGY | Facility: CLINIC | Age: 83
Discharge: HOME | End: 2023-08-11
Admitting: INTERNAL MEDICINE
Payer: COMMERCIAL

## 2023-08-11 DIAGNOSIS — Z95.1 S/P CABG (CORONARY ARTERY BYPASS GRAFT): ICD-10-CM

## 2023-08-11 DIAGNOSIS — I77.810 ASCENDING AORTA DILATION (H): ICD-10-CM

## 2023-08-11 DIAGNOSIS — I35.1 MODERATE AORTIC REGURGITATION: ICD-10-CM

## 2023-08-11 LAB — LVEF ECHO: NORMAL

## 2023-08-11 PROCEDURE — 93306 TTE W/DOPPLER COMPLETE: CPT | Mod: 26 | Performed by: INTERNAL MEDICINE

## 2023-08-11 PROCEDURE — 93306 TTE W/DOPPLER COMPLETE: CPT

## 2023-09-20 DIAGNOSIS — H81.13 BENIGN PAROXYSMAL POSITIONAL VERTIGO DUE TO BILATERAL VESTIBULAR DISORDER: ICD-10-CM

## 2023-09-20 DIAGNOSIS — E11.8 TYPE 2 DIABETES MELLITUS WITH COMPLICATION (H): ICD-10-CM

## 2023-09-20 NOTE — TELEPHONE ENCOUNTER
The patient will need to set up a face-to-face appointment with me prior to any subsequent refills.    Please help the patient set up an appointment.    Thank you.  Benjy

## 2023-10-02 NOTE — TELEPHONE ENCOUNTER
Patient calling on request for his needles, stating he only has one left. Also stating he is needing his meclizine refilled as well.     Informed of providers message below.    Office visit has been scheduled on 10/19 with an 8:20 am arrival, will come fasting for labs.  Adjustment placed on quantity to fill till he is seen for his appointment.  Lola Rutherford LPN

## 2023-10-03 RX ORDER — PEN NEEDLE, DIABETIC 31 GX5/16"
NEEDLE, DISPOSABLE MISCELLANEOUS
Qty: 30 EACH | Refills: 0 | Status: SHIPPED | OUTPATIENT
Start: 2023-10-03 | End: 2023-12-01

## 2023-10-03 RX ORDER — MECLIZINE HYDROCHLORIDE 25 MG/1
25 TABLET ORAL 3 TIMES DAILY PRN
Qty: 25 TABLET | Refills: 0 | Status: SHIPPED | OUTPATIENT
Start: 2023-10-03 | End: 2023-10-19

## 2023-10-03 NOTE — TELEPHONE ENCOUNTER
Called and LM for patient to call back. Please relay below.     Orders signed by provider. Script sent to pharmacy.     Maci Jauregui MA

## 2023-10-19 ENCOUNTER — OFFICE VISIT (OUTPATIENT)
Dept: INTERNAL MEDICINE | Facility: CLINIC | Age: 83
End: 2023-10-19
Payer: COMMERCIAL

## 2023-10-19 VITALS
BODY MASS INDEX: 32.51 KG/M2 | RESPIRATION RATE: 18 BRPM | HEART RATE: 60 BPM | OXYGEN SATURATION: 98 % | TEMPERATURE: 97.6 F | WEIGHT: 213.8 LBS | DIASTOLIC BLOOD PRESSURE: 70 MMHG | SYSTOLIC BLOOD PRESSURE: 130 MMHG

## 2023-10-19 DIAGNOSIS — R91.8 PULMONARY NODULES: ICD-10-CM

## 2023-10-19 DIAGNOSIS — D69.6 THROMBOCYTOPENIA (H): ICD-10-CM

## 2023-10-19 DIAGNOSIS — F33.42 RECURRENT MAJOR DEPRESSIVE DISORDER, IN FULL REMISSION (H): ICD-10-CM

## 2023-10-19 DIAGNOSIS — H81.13 BENIGN PAROXYSMAL POSITIONAL VERTIGO DUE TO BILATERAL VESTIBULAR DISORDER: ICD-10-CM

## 2023-10-19 DIAGNOSIS — I73.9 INTERMITTENT CLAUDICATION (H): ICD-10-CM

## 2023-10-19 DIAGNOSIS — Z79.4 TYPE 2 DIABETES MELLITUS WITH STAGE 2 CHRONIC KIDNEY DISEASE, WITH LONG-TERM CURRENT USE OF INSULIN (H): Primary | ICD-10-CM

## 2023-10-19 DIAGNOSIS — R60.1 GENERALIZED EDEMA: ICD-10-CM

## 2023-10-19 DIAGNOSIS — I25.118 ATHEROSCLEROSIS OF NATIVE CORONARY ARTERY OF NATIVE HEART WITH STABLE ANGINA PECTORIS (H): ICD-10-CM

## 2023-10-19 DIAGNOSIS — N18.2 TYPE 2 DIABETES MELLITUS WITH STAGE 2 CHRONIC KIDNEY DISEASE, WITH LONG-TERM CURRENT USE OF INSULIN (H): Primary | ICD-10-CM

## 2023-10-19 DIAGNOSIS — C18.9 MALIGNANT NEOPLASM OF COLON, UNSPECIFIED PART OF COLON (H): ICD-10-CM

## 2023-10-19 DIAGNOSIS — N18.31 STAGE 3A CHRONIC KIDNEY DISEASE (H): ICD-10-CM

## 2023-10-19 DIAGNOSIS — E11.22 TYPE 2 DIABETES MELLITUS WITH STAGE 2 CHRONIC KIDNEY DISEASE, WITH LONG-TERM CURRENT USE OF INSULIN (H): Primary | ICD-10-CM

## 2023-10-19 DIAGNOSIS — Z12.5 SCREENING FOR PROSTATE CANCER: ICD-10-CM

## 2023-10-19 LAB
ALBUMIN SERPL BCG-MCNC: 4.5 G/DL (ref 3.5–5.2)
ALP SERPL-CCNC: 99 U/L (ref 40–129)
ALT SERPL W P-5'-P-CCNC: 20 U/L (ref 0–70)
ANION GAP SERPL CALCULATED.3IONS-SCNC: 10 MMOL/L (ref 7–15)
AST SERPL W P-5'-P-CCNC: 23 U/L (ref 0–45)
BILIRUB DIRECT SERPL-MCNC: <0.2 MG/DL (ref 0–0.3)
BILIRUB SERPL-MCNC: 0.8 MG/DL
BUN SERPL-MCNC: 21.4 MG/DL (ref 8–23)
CALCIUM SERPL-MCNC: 9.1 MG/DL (ref 8.8–10.2)
CHLORIDE SERPL-SCNC: 106 MMOL/L (ref 98–107)
CREAT SERPL-MCNC: 1.32 MG/DL (ref 0.67–1.17)
CREAT UR-MCNC: 136.2 MG/DL
DEPRECATED HCO3 PLAS-SCNC: 26 MMOL/L (ref 22–29)
EGFRCR SERPLBLD CKD-EPI 2021: 53 ML/MIN/1.73M2
ERYTHROCYTE [DISTWIDTH] IN BLOOD BY AUTOMATED COUNT: 12.9 % (ref 10–15)
GLUCOSE SERPL-MCNC: 132 MG/DL (ref 70–99)
HBA1C MFR BLD: 6 %
HCT VFR BLD AUTO: 39.7 % (ref 40–53)
HGB BLD-MCNC: 13.2 G/DL (ref 13.3–17.7)
MCH RBC QN AUTO: 33.3 PG (ref 26.5–33)
MCHC RBC AUTO-ENTMCNC: 33.2 G/DL (ref 31.5–36.5)
MCV RBC AUTO: 100 FL (ref 78–100)
MICROALBUMIN UR-MCNC: 32.7 MG/L
MICROALBUMIN/CREAT UR: 24.01 MG/G CR (ref 0–17)
PLATELET # BLD AUTO: 135 10E3/UL (ref 150–450)
POTASSIUM SERPL-SCNC: 4.5 MMOL/L (ref 3.4–5.3)
PROT SERPL-MCNC: 7.1 G/DL (ref 6.4–8.3)
PSA SERPL DL<=0.01 NG/ML-MCNC: 4.43 NG/ML
RBC # BLD AUTO: 3.96 10E6/UL (ref 4.4–5.9)
SODIUM SERPL-SCNC: 142 MMOL/L (ref 135–145)
WBC # BLD AUTO: 5 10E3/UL (ref 4–11)

## 2023-10-19 PROCEDURE — 80053 COMPREHEN METABOLIC PANEL: CPT | Performed by: INTERNAL MEDICINE

## 2023-10-19 PROCEDURE — 82248 BILIRUBIN DIRECT: CPT | Performed by: INTERNAL MEDICINE

## 2023-10-19 PROCEDURE — 36415 COLL VENOUS BLD VENIPUNCTURE: CPT | Performed by: INTERNAL MEDICINE

## 2023-10-19 PROCEDURE — 82570 ASSAY OF URINE CREATININE: CPT | Performed by: INTERNAL MEDICINE

## 2023-10-19 PROCEDURE — 85027 COMPLETE CBC AUTOMATED: CPT | Performed by: INTERNAL MEDICINE

## 2023-10-19 PROCEDURE — 82043 UR ALBUMIN QUANTITATIVE: CPT | Performed by: INTERNAL MEDICINE

## 2023-10-19 PROCEDURE — G0103 PSA SCREENING: HCPCS | Performed by: INTERNAL MEDICINE

## 2023-10-19 PROCEDURE — 83036 HEMOGLOBIN GLYCOSYLATED A1C: CPT | Performed by: INTERNAL MEDICINE

## 2023-10-19 PROCEDURE — 99214 OFFICE O/P EST MOD 30 MIN: CPT | Performed by: INTERNAL MEDICINE

## 2023-10-19 RX ORDER — RESPIRATORY SYNCYTIAL VIRUS VACCINE 120MCG/0.5
0.5 KIT INTRAMUSCULAR ONCE
Qty: 1 EACH | Refills: 0 | Status: CANCELLED | OUTPATIENT
Start: 2023-10-19 | End: 2023-10-19

## 2023-10-19 RX ORDER — INSULIN LISPRO 100 [IU]/ML
18 INJECTION, SOLUTION INTRAVENOUS; SUBCUTANEOUS
Qty: 30 ML | Refills: 3 | Status: SHIPPED | OUTPATIENT
Start: 2023-10-19 | End: 2024-08-05

## 2023-10-19 RX ORDER — MECLIZINE HYDROCHLORIDE 25 MG/1
25 TABLET ORAL 3 TIMES DAILY PRN
Qty: 90 TABLET | Refills: 0 | Status: SHIPPED | OUTPATIENT
Start: 2023-10-19 | End: 2023-11-14

## 2023-10-19 RX ORDER — POTASSIUM CHLORIDE 750 MG/1
10 TABLET, EXTENDED RELEASE ORAL DAILY
Qty: 90 TABLET | Refills: 3 | Status: SHIPPED | OUTPATIENT
Start: 2023-10-19

## 2023-10-19 RX ORDER — FUROSEMIDE 20 MG
20 TABLET ORAL DAILY
Qty: 90 TABLET | Refills: 3 | Status: SHIPPED | OUTPATIENT
Start: 2023-10-19

## 2023-10-19 ASSESSMENT — PAIN SCALES - GENERAL: PAINLEVEL: NO PAIN (0)

## 2023-10-19 ASSESSMENT — PATIENT HEALTH QUESTIONNAIRE - PHQ9
SUM OF ALL RESPONSES TO PHQ QUESTIONS 1-9: 3
10. IF YOU CHECKED OFF ANY PROBLEMS, HOW DIFFICULT HAVE THESE PROBLEMS MADE IT FOR YOU TO DO YOUR WORK, TAKE CARE OF THINGS AT HOME, OR GET ALONG WITH OTHER PEOPLE: NOT DIFFICULT AT ALL
SUM OF ALL RESPONSES TO PHQ QUESTIONS 1-9: 3

## 2023-10-19 NOTE — PROGRESS NOTES
Cece Oliva is a 82 year old, presenting for the following health issues:  Diabetes      10/19/2023     8:06 AM   Additional Questions   Roomed by Maria Teresa MORFIN       History of Present Illness       Diabetes:   He presents for follow up of diabetes.  He is checking home blood glucose one time daily.   He checks blood glucose before meals.  Blood glucose is never over 200 and never under 70. He is aware of hypoglycemia symptoms including blurred vision.    He has no concerns regarding his diabetes at this time.  He is having burning in feet.  The patient has had a diabetic eye exam in the last 12 months. Eye exam performed on next appointment is in Novemeber. Location of last eye exam [facility].          Has to be careful bending over to pick things up, otherwise will lose balance    Rash on side of face    Sore on lower abdomen, still not going away, thinking has got bigger in size       EMR reviewed including:             Complaint, History of Chief Complaint, Corresponding Review of Systems, and Complaint Specific Physical Examination.    #1   Follow-up on type 2 diabetes  Check blood sugars once daily  Takes bedtime Lantus with preprandial fast acting insulin.  Continues with metformin regularly.  Denies any symptoms of hypoglycemia.  Denies polyuria or polydipsia.  Last A1c was 6.2 in December last year.        Exam:   LUNGS: clear bilaterally, airflow is brisk, no intercostal retraction or stridor is noted. No coughing is noted during visit.   HEART:  regular without rubs, clicks, gallops, or murmurs. PMI is nondisplaced. Upstrokes are brisk. S1,S2 are heard.   GI: Abdomen is soft, without rebound, guarding or tenderness. Bowel sounds are appropriate. No renal bruits are heard.   NEURO: Pt is alert and appropriate. No neurologic lateralization is noted. Cranial nerves 2-12 are intact. Peripheral sensory and motor function are grossly normal.       #2   Peripheral vascular disease with some  intermittent claudication  Requires a rest after walking over 100 feet  Denies significant leg pain at rest.  Does not wish any further work-up at this time.  No evidence of ischemia to lower extremities physically noted.  No pallor, no ulcerations.  Pulses are palpable.      #3   Follow-up on coronary artery disease  Status post coronary artery bypass grafting  Denies chest pain  Last saw his cardiologist at [facility] in July.  Echocardiogram performed and unremarkable.  Ejection fraction 60%.           #4   Exam:  As noted above.  For complaint follow-up on serendipitously noted pulmonary nodules on CT.  Patient has CT ordered but did not attend.  We will reschedule.  Denies hemoptysis, shortness of breath etc.        Exam:  As above        Patient has been interviewed, applicable history and applied review of systems have been performed.    Vital Signs:   /70   Pulse 60   Temp 97.6  F (36.4  C) (Temporal)   Resp 18   Wt 97 kg (213 lb 12.8 oz)   SpO2 98%   BMI 32.51 kg/m        Decision Making    Problem and Complexity     1. Type 2 diabetes mellitus with stage 2 chronic kidney disease, with long-term current use of insulin (H)  Check A1c and renal function.  Just medications accordingly.  - HEMOGLOBIN A1C; Future  - Basic metabolic panel  (Ca, Cl, CO2, Creat, Gluc, K, Na, BUN); Future  - Hepatic panel (Albumin, ALT, AST, Bili, Alk Phos, TP); Future    2. Stage 3a chronic kidney disease (H)  Stable.  Check microalbumin  - Albumin Random Urine Quantitative with Creat Ratio; Future    3. Recurrent major depressive disorder, in full remission (H24)  Currently doing well.  Denies any suicidal intention or ideation.      4. Intermittent claudication (H24)  Mild symptomatology.  We will follow    5. Thrombocytopenia (H24)  Recheck platelet count  - CBC with platelets; Future    6. 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  Follow cardiology    7.  Malignant neoplasm of colon, unspecified part of colon (H)  Last colonoscopy 2011.  Refusing any further at this time.    8. Screening for prostate cancer  Screening to be performed  - PSA, screen; Future    9. Pulmonary nodules  CAT scan to be rescheduled  - CT Chest w/o Contrast; Future                                FOLLOW UP   I have asked the patient to make an appointment for followup with either:  1.  Me,  2.  The patient's preferred provider,  3.  Any available provider  In 2 weeks pending upon results        Regarding routine vaccinations:  I have reviewed the patient's vaccination schedule and discussed the benefits of prophylactic vaccination in detail.  I recommend the patient contact their pharmacist for vaccinations.  Discussed that most insurance companies now favor reimbursement to the pharmacies and it will financially behoove the patient to have vaccinations performed at their pharmacy.        I have carefully explained the diagnosis and treatment options to the patient.  The patient has displayed an understanding of the above, and all subsequent questions were answered.      DO CAREN Phillips    Portions of this note were produced using [employer]  Although every attempt at real-time proof reading has been made, occasional grammar/syntax errors may have been missed.

## 2023-10-19 NOTE — LETTER
November 1, 2023      Pj ROJAS Pavel  [address] [facility] [address]        Dear ,    We are writing to inform you of your test results.          The microalbumin is within acceptable limits.  Minimal protein loss is noted in the urine.  Chemistry panel shows kidney function is slightly decreased but stable.  The glycosylated hemoglobin is within acceptable limits suggesting adequate blood sugar control.  Blood cell count is within acceptable limits.  There is no evidence of anemia or leukemia.  The prostate-specific antigen is consistent with a low risk of prostate cancer.  The liver tests are within acceptable limits.      You will be contacted with any outstanding results as they are available.  Feel free to contact me via the office or My Chart if you have any questions regarding the above.    Resulted Orders   HEMOGLOBIN A1C   Result Value Ref Range    Hemoglobin A1C 6.0 (H) <5.7 %      Comment:      Normal <5.7%   Prediabetes 5.7-6.4%    Diabetes 6.5% or higher     Note: Adopted from ADA consensus guidelines.   Albumin Random Urine Quantitative with Creat Ratio   Result Value Ref Range    Creatinine Urine mg/dL 136.2 mg/dL      Comment:      The reference ranges have not been established in urine creatinine. The results should be integrated into the clinical context for interpretation.    Albumin Urine mg/L 32.7 mg/L      Comment:      The reference ranges have not been established in urine albumin. The results should be integrated into the clinical context for interpretation.    Albumin Urine mg/g Cr 24.01 (H) 0.00 - 17.00 mg/g Cr      Comment:      Microalbuminuria is defined as an albumin:creatinine ratio of 17 to 299 for males and 25 to 299 for females. A ratio of albumin:creatinine of 300 or higher is indicative of overt proteinuria.  Due to biologic variability, positive results should be confirmed by a second, first-morning random or 24-hour timed urine specimen. If there is  discrepancy, a third specimen is recommended. When 2 out of 3 results are in the microalbuminuria range, this is evidence for incipient nephropathy and warrants increased efforts at glucose control, blood pressure control, and institution of therapy with an angiotensin-converting-enzyme (ACE) inhibitor (if the patient can tolerate it).     Basic metabolic panel  (Ca, Cl, CO2, Creat, Gluc, K, Na, BUN)   Result Value Ref Range    Sodium 142 135 - 145 mmol/L      Comment:      Reference intervals for this test were updated on 09/26/2023 to more accurately reflect our healthy population. There may be differences in the flagging of prior results with similar values performed with this method. Interpretation of those prior results can be made in the context of the updated reference intervals.     Potassium 4.5 3.4 - 5.3 mmol/L    Chloride 106 98 - 107 mmol/L    Carbon Dioxide (CO2) 26 22 - 29 mmol/L    Anion Gap 10 7 - 15 mmol/L    Urea Nitrogen 21.4 8.0 - 23.0 mg/dL    Creatinine 1.32 (H) 0.67 - 1.17 mg/dL    GFR Estimate 53 (L) >60 mL/min/1.73m2    Calcium 9.1 8.8 - 10.2 mg/dL    Glucose 132 (H) 70 - 99 mg/dL   PSA, screen   Result Value Ref Range    Prostate Specific Antigen Screen 4.43 ng/mL      Comment:      No reference ranges have been established for patients over 80 years.    Narrative    This result is obtained using the Roche Elecsys total PSA method on the joshua [id number] immunoassay analyzer. Results obtained with different assay methods or kits cannot be used interchangeably.   CBC with platelets   Result Value Ref Range    WBC Count 5.0 4.0 - 11.0 10e3/uL    RBC Count 3.96 (L) 4.40 - 5.90 10e6/uL    Hemoglobin 13.2 (L) 13.3 - 17.7 g/dL    Hematocrit 39.7 (L) 40.0 - 53.0 %     78 - 100 fL    MCH 33.3 (H) 26.5 - 33.0 pg    MCHC 33.2 31.5 - 36.5 g/dL    RDW 12.9 10.0 - 15.0 %    Platelet Count 135 (L) 150 - 450 10e3/uL   Hepatic panel (Albumin, ALT, AST, Bili, Alk Phos, TP)   Result Value Ref Range     Protein Total 7.1 6.4 - 8.3 g/dL    Albumin 4.5 3.5 - 5.2 g/dL    Bilirubin Total 0.8 <=1.2 mg/dL    Alkaline Phosphatase 99 40 - 129 U/L    AST 23 0 - 45 U/L      Comment:      Reference intervals for this test were updated on 6/12/2023 to more accurately reflect our healthy population. There may be differences in the flagging of prior results with similar values performed with this method. Interpretation of those prior results can be made in the context of the updated reference intervals.    ALT 20 0 - 70 U/L      Comment:      Reference intervals for this test were updated on 6/12/2023 to more accurately reflect our healthy population. There may be differences in the flagging of prior results with similar values performed with this method. Interpretation of those prior results can be made in the context of the updated reference intervals.      Bilirubin Direct <0.20 0.00 - 0.30 mg/dL       If you have any questions or concerns, please call the clinic at the number listed above.       Sincerely,      Porfirio Burleson, DO

## 2023-10-23 ENCOUNTER — HOSPITAL ENCOUNTER (OUTPATIENT)
Dept: CT IMAGING | Facility: CLINIC | Age: 83
Discharge: HOME | End: 2023-10-23
Attending: INTERNAL MEDICINE | Admitting: INTERNAL MEDICINE
Payer: COMMERCIAL

## 2023-10-23 DIAGNOSIS — R91.8 PULMONARY NODULES: ICD-10-CM

## 2023-10-23 PROCEDURE — 71250 CT THORAX DX C-: CPT

## 2023-11-13 ENCOUNTER — TRANSFERRED RECORDS (OUTPATIENT)
Dept: HEALTH INFORMATION MANAGEMENT | Facility: CLINIC | Age: 83
End: 2023-11-13
Payer: COMMERCIAL

## 2023-11-13 LAB — RETINOPATHY: POSITIVE

## 2023-11-14 DIAGNOSIS — H81.13 BENIGN PAROXYSMAL POSITIONAL VERTIGO DUE TO BILATERAL VESTIBULAR DISORDER: ICD-10-CM

## 2023-11-14 RX ORDER — MECLIZINE HYDROCHLORIDE 25 MG/1
25 TABLET ORAL 3 TIMES DAILY PRN
Qty: 90 TABLET | Refills: 0 | Status: SHIPPED | OUTPATIENT
Start: 2023-11-14 | End: 2023-12-12

## 2023-12-01 DIAGNOSIS — E11.8 TYPE 2 DIABETES MELLITUS WITH COMPLICATION (H): ICD-10-CM

## 2023-12-01 RX ORDER — PEN NEEDLE, DIABETIC 31 GX5/16"
NEEDLE, DISPOSABLE MISCELLANEOUS
Qty: 30 EACH | Refills: 3 | Status: SHIPPED | OUTPATIENT
Start: 2023-12-01 | End: 2024-02-26

## 2023-12-12 DIAGNOSIS — H81.13 BENIGN PAROXYSMAL POSITIONAL VERTIGO DUE TO BILATERAL VESTIBULAR DISORDER: ICD-10-CM

## 2023-12-12 RX ORDER — MECLIZINE HYDROCHLORIDE 25 MG/1
25 TABLET ORAL 3 TIMES DAILY PRN
Qty: 90 TABLET | Refills: 0 | Status: SHIPPED | OUTPATIENT
Start: 2023-12-12 | End: 2024-04-01

## 2024-01-02 DIAGNOSIS — E11.22 TYPE 2 DIABETES MELLITUS WITH STAGE 2 CHRONIC KIDNEY DISEASE, WITH LONG-TERM CURRENT USE OF INSULIN (H): ICD-10-CM

## 2024-01-02 DIAGNOSIS — Z79.4 TYPE 2 DIABETES MELLITUS WITH STAGE 2 CHRONIC KIDNEY DISEASE, WITH LONG-TERM CURRENT USE OF INSULIN (H): ICD-10-CM

## 2024-01-02 DIAGNOSIS — N18.2 TYPE 2 DIABETES MELLITUS WITH STAGE 2 CHRONIC KIDNEY DISEASE, WITH LONG-TERM CURRENT USE OF INSULIN (H): ICD-10-CM

## 2024-01-03 RX ORDER — BLOOD SUGAR DIAGNOSTIC
STRIP MISCELLANEOUS
Qty: 100 STRIP | Refills: 5 | Status: SHIPPED | OUTPATIENT
Start: 2024-01-03 | End: 2024-09-17

## 2024-01-24 DIAGNOSIS — E11.22 TYPE 2 DIABETES MELLITUS WITH STAGE 2 CHRONIC KIDNEY DISEASE, WITH LONG-TERM CURRENT USE OF INSULIN (H): ICD-10-CM

## 2024-01-24 DIAGNOSIS — N18.2 TYPE 2 DIABETES MELLITUS WITH STAGE 2 CHRONIC KIDNEY DISEASE, WITH LONG-TERM CURRENT USE OF INSULIN (H): ICD-10-CM

## 2024-01-24 DIAGNOSIS — Z79.4 TYPE 2 DIABETES MELLITUS WITH STAGE 2 CHRONIC KIDNEY DISEASE, WITH LONG-TERM CURRENT USE OF INSULIN (H): ICD-10-CM

## 2024-01-24 DIAGNOSIS — E78.5 HYPERLIPIDEMIA LDL GOAL <100: ICD-10-CM

## 2024-01-24 RX ORDER — GABAPENTIN 300 MG/1
600 CAPSULE ORAL AT BEDTIME
Qty: 180 CAPSULE | Refills: 3 | Status: SHIPPED | OUTPATIENT
Start: 2024-01-24

## 2024-01-24 RX ORDER — ATORVASTATIN CALCIUM 40 MG/1
40 TABLET, FILM COATED ORAL
Qty: 90 TABLET | Refills: 3 | Status: SHIPPED | OUTPATIENT
Start: 2024-01-24

## 2024-01-24 NOTE — TELEPHONE ENCOUNTER
Gabapentin      Last Written Prescription Date:  12/22/2022  Last Fill Quantity: 180,   # refills: 3  Last Office Visit: 10-  Future Office visit:       Routing refill request to provider for review/approval because:  Drug not on the [facility], [facility] or [facility] refill protocol or controlled substance

## 2024-02-26 DIAGNOSIS — E11.8 TYPE 2 DIABETES MELLITUS WITH COMPLICATION (H): ICD-10-CM

## 2024-02-26 RX ORDER — PEN NEEDLE, DIABETIC 31 GX5/16"
NEEDLE, DISPOSABLE MISCELLANEOUS
Qty: 30 EACH | Refills: 3 | Status: SHIPPED | OUTPATIENT
Start: 2024-02-26 | End: 2024-05-22

## 2024-02-26 RX ORDER — LISINOPRIL 20 MG/1
20 TABLET ORAL 2 TIMES DAILY
Qty: 180 TABLET | Refills: 3 | Status: SHIPPED | OUTPATIENT
Start: 2024-02-26

## 2024-02-27 DIAGNOSIS — Z79.4 TYPE 2 DIABETES MELLITUS WITH STAGE 2 CHRONIC KIDNEY DISEASE, WITH LONG-TERM CURRENT USE OF INSULIN (H): ICD-10-CM

## 2024-02-27 DIAGNOSIS — N18.2 TYPE 2 DIABETES MELLITUS WITH STAGE 2 CHRONIC KIDNEY DISEASE, WITH LONG-TERM CURRENT USE OF INSULIN (H): ICD-10-CM

## 2024-02-27 DIAGNOSIS — E11.22 TYPE 2 DIABETES MELLITUS WITH STAGE 2 CHRONIC KIDNEY DISEASE, WITH LONG-TERM CURRENT USE OF INSULIN (H): ICD-10-CM

## 2024-02-27 RX ORDER — INSULIN GLARGINE 100 [IU]/ML
35 INJECTION, SOLUTION SUBCUTANEOUS AT BEDTIME
Qty: 30 ML | Refills: 3 | Status: CANCELLED | OUTPATIENT
Start: 2024-02-27

## 2024-02-28 DIAGNOSIS — Z79.4 TYPE 2 DIABETES MELLITUS WITH STAGE 2 CHRONIC KIDNEY DISEASE, WITH LONG-TERM CURRENT USE OF INSULIN (H): ICD-10-CM

## 2024-02-28 DIAGNOSIS — E11.22 TYPE 2 DIABETES MELLITUS WITH STAGE 2 CHRONIC KIDNEY DISEASE, WITH LONG-TERM CURRENT USE OF INSULIN (H): ICD-10-CM

## 2024-02-28 DIAGNOSIS — N18.2 TYPE 2 DIABETES MELLITUS WITH STAGE 2 CHRONIC KIDNEY DISEASE, WITH LONG-TERM CURRENT USE OF INSULIN (H): ICD-10-CM

## 2024-02-28 RX ORDER — INSULIN GLARGINE 100 [IU]/ML
35 INJECTION, SOLUTION SUBCUTANEOUS AT BEDTIME
Qty: 30 ML | Refills: 3 | Status: SHIPPED | OUTPATIENT
Start: 2024-02-28

## 2024-03-30 DIAGNOSIS — H81.13 BENIGN PAROXYSMAL POSITIONAL VERTIGO DUE TO BILATERAL VESTIBULAR DISORDER: ICD-10-CM

## 2024-04-01 RX ORDER — MECLIZINE HYDROCHLORIDE 25 MG/1
25 TABLET ORAL 3 TIMES DAILY PRN
Qty: 90 TABLET | Refills: 0 | Status: SHIPPED | OUTPATIENT
Start: 2024-04-01 | End: 2024-04-29

## 2024-04-28 DIAGNOSIS — H81.13 BENIGN PAROXYSMAL POSITIONAL VERTIGO DUE TO BILATERAL VESTIBULAR DISORDER: ICD-10-CM

## 2024-04-29 RX ORDER — MECLIZINE HYDROCHLORIDE 25 MG/1
25 TABLET ORAL 3 TIMES DAILY PRN
Qty: 90 TABLET | Refills: 0 | Status: SHIPPED | OUTPATIENT
Start: 2024-04-29 | End: 2024-05-28

## 2024-05-22 DIAGNOSIS — E11.8 TYPE 2 DIABETES MELLITUS WITH COMPLICATION (H): ICD-10-CM

## 2024-05-22 RX ORDER — PEN NEEDLE, DIABETIC 31 GX5/16"
NEEDLE, DISPOSABLE MISCELLANEOUS
Qty: 100 EACH | Refills: 0 | Status: SHIPPED | OUTPATIENT
Start: 2024-05-22 | End: 2024-08-19

## 2024-05-28 DIAGNOSIS — H81.13 BENIGN PAROXYSMAL POSITIONAL VERTIGO DUE TO BILATERAL VESTIBULAR DISORDER: ICD-10-CM

## 2024-05-28 RX ORDER — MECLIZINE HYDROCHLORIDE 25 MG/1
25 TABLET ORAL 3 TIMES DAILY PRN
Qty: 90 TABLET | Refills: 0 | Status: SHIPPED | OUTPATIENT
Start: 2024-05-28 | End: 2024-06-25

## 2024-06-20 ENCOUNTER — HOSPITAL ENCOUNTER (OUTPATIENT)
Facility: CLINIC | Age: 84
Discharge: HOME | End: 2024-06-20
Attending: INTERNAL MEDICINE | Admitting: INTERNAL MEDICINE
Payer: COMMERCIAL

## 2024-06-20 VITALS — BODY MASS INDEX: 32.39 KG/M2 | WEIGHT: 213 LBS

## 2024-06-20 PROCEDURE — 250N000009 HC RX 250: Performed by: INTERNAL MEDICINE

## 2024-06-20 PROCEDURE — 66821 AFTER CATARACT LASER SURGERY: CPT | Performed by: INTERNAL MEDICINE

## 2024-06-20 RX ORDER — TIMOLOL MALEATE 5 MG/ML
1 SOLUTION/ DROPS OPHTHALMIC 2 TIMES DAILY
COMMUNITY
Start: 2023-11-25

## 2024-06-20 RX ORDER — TROPICAMIDE 10 MG/ML
1 SOLUTION/ DROPS OPHTHALMIC ONCE
Status: COMPLETED | OUTPATIENT
Start: 2024-06-20 | End: 2024-06-20

## 2024-06-20 RX ORDER — DORZOLAMIDE HYDROCHLORIDE AND TIMOLOL MALEATE 20; 5 MG/ML; MG/ML
1 SOLUTION/ DROPS OPHTHALMIC 2 TIMES DAILY
COMMUNITY
Start: 2024-05-24

## 2024-06-20 RX ORDER — TETRACAINE HYDROCHLORIDE 5 MG/ML
1 SOLUTION OPHTHALMIC ONCE
Status: DISCONTINUED | OUTPATIENT
Start: 2024-06-20 | End: 2024-06-20 | Stop reason: HOSPADM

## 2024-06-20 RX ORDER — LATANOPROST 50 UG/ML
1 SOLUTION/ DROPS OPHTHALMIC
COMMUNITY
Start: 2024-05-24

## 2024-06-20 RX ORDER — BRIMONIDINE TARTRATE 2 MG/ML
1 SOLUTION/ DROPS OPHTHALMIC 3 TIMES DAILY
COMMUNITY
Start: 2023-11-25

## 2024-06-20 RX ORDER — PHENYLEPHRINE HYDROCHLORIDE 25 MG/ML
1 SOLUTION/ DROPS OPHTHALMIC ONCE
Status: COMPLETED | OUTPATIENT
Start: 2024-06-20 | End: 2024-06-20

## 2024-06-20 RX ADMIN — TROPICAMIDE 1 DROP: 10 SOLUTION/ DROPS OPHTHALMIC at 10:19

## 2024-06-20 RX ADMIN — PHENYLEPHRINE HYDROCHLORIDE 1 DROP: 25 SOLUTION/ DROPS OPHTHALMIC at 10:19

## 2024-06-20 ASSESSMENT — ACTIVITIES OF DAILY LIVING (ADL): ADLS_ACUITY_SCORE: 35

## 2024-06-20 NOTE — OP NOTE
[facility]  Ophthalmology Operative Note     PREOPERATIVE DIAGNOSIS: Posterior Capsular Opacity, Right EYE.      POSTOPERATIVE DIAGNOSIS: Posterior Capsular Opacity, Right EYE.      OPERATION: YAG Laser Capsulotomy, Right EYE     ANESTHESIA: Topical      INDICATIONS FOR PROCEDURE: Pj Zhao was seen in the [facility] for decreased visual acuity in the right eye. The patient was found to have visually significant posterior capsular opacity in the right eye. The risks, benefits, alternatives and goals of YAG capsulotomy were discussed with the patient, and after adequate discussion the patient understood and agreed to these, and a signed informed consent was obtained prior to the procedure.      DESCRIPTION OF PROCEDURE: After proper patient identification, topical anesthesia was applied to the right eye. The patient was brought to the laser room. A total of 38 shots were placed at the opacified posterior capsule with power of 1.9 mJ. The patient tolerated the procedure well, there were no complications.      Franklin Dee MD

## 2024-06-25 DIAGNOSIS — H81.13 BENIGN PAROXYSMAL POSITIONAL VERTIGO DUE TO BILATERAL VESTIBULAR DISORDER: ICD-10-CM

## 2024-06-25 RX ORDER — MECLIZINE HYDROCHLORIDE 25 MG/1
25 TABLET ORAL 3 TIMES DAILY PRN
Qty: 90 TABLET | Refills: 0 | Status: SHIPPED | OUTPATIENT
Start: 2024-06-25 | End: 2024-09-06

## 2024-06-28 ENCOUNTER — TELEPHONE (OUTPATIENT)
Dept: PHARMACY | Facility: OTHER | Age: 84
End: 2024-06-28
Payer: COMMERCIAL

## 2024-06-28 NOTE — TELEPHONE ENCOUNTER
MTM Recruitment: [employer]     Referral outreach attempt #1 on June 28, 2024      Outcome: interested; would like callback; doesn't use [employer]. Was in the car when I called so wasn't able to schedule

## 2024-07-18 ENCOUNTER — TELEPHONE (OUTPATIENT)
Dept: PHARMACY | Facility: OTHER | Age: 84
End: 2024-07-18
Payer: COMMERCIAL

## 2024-07-18 NOTE — TELEPHONE ENCOUNTER
MTM Recruitment: [employer]     Referral outreach attempt #3 on July 18, 2024      Outcome: left voicemail- Call back number [phone]    Miles Cormier, IleneD, [facility]  Medication Therapy Management Pharmacist  Voicemail: [phone]

## 2024-08-02 DIAGNOSIS — Z79.4 TYPE 2 DIABETES MELLITUS WITH STAGE 2 CHRONIC KIDNEY DISEASE, WITH LONG-TERM CURRENT USE OF INSULIN (H): ICD-10-CM

## 2024-08-02 DIAGNOSIS — N18.2 TYPE 2 DIABETES MELLITUS WITH STAGE 2 CHRONIC KIDNEY DISEASE, WITH LONG-TERM CURRENT USE OF INSULIN (H): ICD-10-CM

## 2024-08-02 DIAGNOSIS — E11.22 TYPE 2 DIABETES MELLITUS WITH STAGE 2 CHRONIC KIDNEY DISEASE, WITH LONG-TERM CURRENT USE OF INSULIN (H): ICD-10-CM

## 2024-08-05 RX ORDER — INSULIN LISPRO 100 [IU]/ML
INJECTION, SOLUTION INTRAVENOUS; SUBCUTANEOUS
Qty: 30 ML | Refills: 3 | Status: SHIPPED | OUTPATIENT
Start: 2024-08-05

## 2024-08-17 DIAGNOSIS — E11.8 TYPE 2 DIABETES MELLITUS WITH COMPLICATION (H): ICD-10-CM

## 2024-08-19 RX ORDER — PEN NEEDLE, DIABETIC 31 GX5/16"
NEEDLE, DISPOSABLE MISCELLANEOUS
Qty: 100 EACH | Refills: 0 | Status: SHIPPED | OUTPATIENT
Start: 2024-08-19

## 2024-09-06 DIAGNOSIS — H81.13 BENIGN PAROXYSMAL POSITIONAL VERTIGO DUE TO BILATERAL VESTIBULAR DISORDER: ICD-10-CM

## 2024-09-06 RX ORDER — MECLIZINE HYDROCHLORIDE 25 MG/1
25 TABLET ORAL 3 TIMES DAILY PRN
Qty: 60 TABLET | Refills: 0 | Status: SHIPPED | OUTPATIENT
Start: 2024-09-06 | End: 2024-09-20

## 2024-09-17 DIAGNOSIS — E11.22 TYPE 2 DIABETES MELLITUS WITH STAGE 2 CHRONIC KIDNEY DISEASE, WITH LONG-TERM CURRENT USE OF INSULIN (H): ICD-10-CM

## 2024-09-17 DIAGNOSIS — N18.2 TYPE 2 DIABETES MELLITUS WITH STAGE 2 CHRONIC KIDNEY DISEASE, WITH LONG-TERM CURRENT USE OF INSULIN (H): ICD-10-CM

## 2024-09-17 DIAGNOSIS — Z79.4 TYPE 2 DIABETES MELLITUS WITH STAGE 2 CHRONIC KIDNEY DISEASE, WITH LONG-TERM CURRENT USE OF INSULIN (H): ICD-10-CM

## 2024-09-17 RX ORDER — BLOOD SUGAR DIAGNOSTIC
STRIP MISCELLANEOUS
Qty: 400 STRIP | Refills: 0 | Status: SHIPPED | OUTPATIENT
Start: 2024-09-17

## 2024-09-20 DIAGNOSIS — H81.13 BENIGN PAROXYSMAL POSITIONAL VERTIGO DUE TO BILATERAL VESTIBULAR DISORDER: ICD-10-CM

## 2024-09-20 RX ORDER — MECLIZINE HYDROCHLORIDE 25 MG/1
25 TABLET ORAL 3 TIMES DAILY PRN
Qty: 60 TABLET | Refills: 0 | Status: SHIPPED | OUTPATIENT
Start: 2024-09-20

## 2024-09-20 NOTE — TELEPHONE ENCOUNTER
Patient has appointment scheduled for 10-03-20.    Patient will need an INTERIM fill    Madhavi Davenport XRO/

## 2024-10-08 ENCOUNTER — OFFICE VISIT (OUTPATIENT)
Dept: INTERNAL MEDICINE | Facility: CLINIC | Age: 84
End: 2024-10-08
Payer: COMMERCIAL

## 2024-10-08 VITALS
SYSTOLIC BLOOD PRESSURE: 128 MMHG | BODY MASS INDEX: 31.05 KG/M2 | RESPIRATION RATE: 18 BRPM | HEART RATE: 48 BPM | HEIGHT: 68 IN | OXYGEN SATURATION: 99 % | TEMPERATURE: 96.1 F | DIASTOLIC BLOOD PRESSURE: 78 MMHG | WEIGHT: 204.9 LBS

## 2024-10-08 DIAGNOSIS — R00.1 SYMPTOMATIC BRADYCARDIA: ICD-10-CM

## 2024-10-08 DIAGNOSIS — Z79.899 MEDICATION MANAGEMENT: ICD-10-CM

## 2024-10-08 DIAGNOSIS — Z79.4 TYPE 2 DIABETES MELLITUS WITH STAGE 2 CHRONIC KIDNEY DISEASE, WITH LONG-TERM CURRENT USE OF INSULIN (H): ICD-10-CM

## 2024-10-08 DIAGNOSIS — N18.31 STAGE 3A CHRONIC KIDNEY DISEASE (H): ICD-10-CM

## 2024-10-08 DIAGNOSIS — H81.13 BENIGN PAROXYSMAL POSITIONAL VERTIGO DUE TO BILATERAL VESTIBULAR DISORDER: Primary | ICD-10-CM

## 2024-10-08 DIAGNOSIS — N18.2 TYPE 2 DIABETES MELLITUS WITH STAGE 2 CHRONIC KIDNEY DISEASE, WITH LONG-TERM CURRENT USE OF INSULIN (H): ICD-10-CM

## 2024-10-08 DIAGNOSIS — E78.5 HYPERLIPIDEMIA LDL GOAL <100: ICD-10-CM

## 2024-10-08 DIAGNOSIS — E11.22 TYPE 2 DIABETES MELLITUS WITH STAGE 2 CHRONIC KIDNEY DISEASE, WITH LONG-TERM CURRENT USE OF INSULIN (H): ICD-10-CM

## 2024-10-08 DIAGNOSIS — I25.118 ATHEROSCLEROSIS OF NATIVE CORONARY ARTERY OF NATIVE HEART WITH STABLE ANGINA PECTORIS (H): ICD-10-CM

## 2024-10-08 DIAGNOSIS — I10 HYPERTENSION GOAL BP (BLOOD PRESSURE) < 140/90: ICD-10-CM

## 2024-10-08 LAB
ALBUMIN SERPL BCG-MCNC: 4.3 G/DL (ref 3.5–5.2)
ALP SERPL-CCNC: 99 U/L (ref 40–150)
ALT SERPL W P-5'-P-CCNC: 18 U/L (ref 0–70)
ANION GAP SERPL CALCULATED.3IONS-SCNC: 9 MMOL/L (ref 7–15)
AST SERPL W P-5'-P-CCNC: 20 U/L (ref 0–45)
BILIRUB DIRECT SERPL-MCNC: <0.2 MG/DL (ref 0–0.3)
BILIRUB SERPL-MCNC: 0.5 MG/DL
BUN SERPL-MCNC: 23.1 MG/DL (ref 8–23)
CALCIUM SERPL-MCNC: 9 MG/DL (ref 8.8–10.4)
CHLORIDE SERPL-SCNC: 103 MMOL/L (ref 98–107)
CHOLEST SERPL-MCNC: 94 MG/DL
CREAT SERPL-MCNC: 1.62 MG/DL (ref 0.67–1.17)
CREAT UR-MCNC: 157.3 MG/DL
EGFRCR SERPLBLD CKD-EPI 2021: 41 ML/MIN/1.73M2
ERYTHROCYTE [DISTWIDTH] IN BLOOD BY AUTOMATED COUNT: 12.9 % (ref 10–15)
EST. AVERAGE GLUCOSE BLD GHB EST-MCNC: 111 MG/DL
FASTING STATUS PATIENT QL REPORTED: YES
FASTING STATUS PATIENT QL REPORTED: YES
GLUCOSE SERPL-MCNC: 94 MG/DL (ref 70–99)
HBA1C MFR BLD: 5.5 %
HCO3 SERPL-SCNC: 27 MMOL/L (ref 22–29)
HCT VFR BLD AUTO: 37.8 % (ref 40–53)
HDLC SERPL-MCNC: 28 MG/DL
HGB BLD-MCNC: 12.6 G/DL (ref 13.3–17.7)
LDLC SERPL CALC-MCNC: 45 MG/DL
MCH RBC QN AUTO: 32.7 PG (ref 26.5–33)
MCHC RBC AUTO-ENTMCNC: 33.3 G/DL (ref 31.5–36.5)
MCV RBC AUTO: 98 FL (ref 78–100)
MICROALBUMIN UR-MCNC: 31.3 MG/L
MICROALBUMIN/CREAT UR: 19.9 MG/G CR (ref 0–17)
NONHDLC SERPL-MCNC: 66 MG/DL
PLATELET # BLD AUTO: 168 10E3/UL (ref 150–450)
POTASSIUM SERPL-SCNC: 4.7 MMOL/L (ref 3.4–5.3)
PROT SERPL-MCNC: 7.5 G/DL (ref 6.4–8.3)
RBC # BLD AUTO: 3.85 10E6/UL (ref 4.4–5.9)
SODIUM SERPL-SCNC: 139 MMOL/L (ref 135–145)
TRIGL SERPL-MCNC: 104 MG/DL
WBC # BLD AUTO: 5.4 10E3/UL (ref 4–11)

## 2024-10-08 PROCEDURE — 83036 HEMOGLOBIN GLYCOSYLATED A1C: CPT | Performed by: INTERNAL MEDICINE

## 2024-10-08 PROCEDURE — 82570 ASSAY OF URINE CREATININE: CPT | Performed by: INTERNAL MEDICINE

## 2024-10-08 PROCEDURE — 82043 UR ALBUMIN QUANTITATIVE: CPT | Performed by: INTERNAL MEDICINE

## 2024-10-08 PROCEDURE — 85027 COMPLETE CBC AUTOMATED: CPT | Performed by: INTERNAL MEDICINE

## 2024-10-08 PROCEDURE — 80061 LIPID PANEL: CPT | Performed by: INTERNAL MEDICINE

## 2024-10-08 PROCEDURE — 99214 OFFICE O/P EST MOD 30 MIN: CPT | Performed by: INTERNAL MEDICINE

## 2024-10-08 PROCEDURE — 36415 COLL VENOUS BLD VENIPUNCTURE: CPT | Performed by: INTERNAL MEDICINE

## 2024-10-08 PROCEDURE — 82248 BILIRUBIN DIRECT: CPT | Performed by: INTERNAL MEDICINE

## 2024-10-08 PROCEDURE — 80053 COMPREHEN METABOLIC PANEL: CPT | Performed by: INTERNAL MEDICINE

## 2024-10-08 PROCEDURE — G2211 COMPLEX E/M VISIT ADD ON: HCPCS | Performed by: INTERNAL MEDICINE

## 2024-10-08 RX ORDER — METOPROLOL SUCCINATE 25 MG/1
12.5 TABLET, EXTENDED RELEASE ORAL DAILY
Qty: 14 TABLET | Refills: 0 | Status: SHIPPED | OUTPATIENT
Start: 2024-10-08 | End: 2024-10-22

## 2024-10-08 RX ORDER — ATORVASTATIN CALCIUM 40 MG/1
40 TABLET, FILM COATED ORAL
Qty: 90 TABLET | Refills: 3 | Status: SHIPPED | OUTPATIENT
Start: 2024-10-08

## 2024-10-08 RX ORDER — MECLIZINE HYDROCHLORIDE 25 MG/1
25 TABLET ORAL 3 TIMES DAILY PRN
Qty: 60 TABLET | Refills: 3 | Status: SHIPPED | OUTPATIENT
Start: 2024-10-08

## 2024-10-08 ASSESSMENT — PAIN SCALES - GENERAL: PAINLEVEL: NO PAIN (0)

## 2024-10-08 ASSESSMENT — PATIENT HEALTH QUESTIONNAIRE - PHQ9
SUM OF ALL RESPONSES TO PHQ QUESTIONS 1-9: 0
SUM OF ALL RESPONSES TO PHQ QUESTIONS 1-9: 0

## 2024-10-08 NOTE — PROGRESS NOTES
"      Cece Oliva is a 83 year old, presenting for the following health issues:  Recheck Medication (Meclizine )      10/8/2024     9:44 AM   Additional Questions   Roomed by Janine SHEEHAN     History of Present Illness       Reason for visit:  Recheck Meclizine   He is taking medications regularly.                EMR reviewed including:             Complaint, History of Chief Complaint, Corresponding Review of Systems, and Complaint Specific Physical Examination.    #1   Patient presents today for follow-up of his chronic vertigo.  Generally gets good results with meclizine.  Previously worked up without specific cause.  Denies falls or injuries.  Notes increased tiredness, lethargy and fatigue.  States that he was \"trying to move some cement blocks and got tired\".  Denies any chest pain.  Notes that his vertiginous episodes are generally brief and self resolving.          Exam:   ENT: Pharynx is non-erythemous, minimal PND, no significant nasal obstruction, TM's not red or retracted, hearing intact bilaterally. No carotid bruits are heard. No JVD seen. Thyroid is not nodular or enlarged.   NEURO: Pt is alert and appropriate. No neurologic lateralization is noted. Cranial nerves 2-12 are intact. Peripheral sensory and motor function are grossly normal.       #2   Fatigue.  Recent cardiology notes reviewed.  Patient on 25 mg of metoprolol daily.  Continues to have heart rate of 48 today in the office.  Denies symptoms of significant congestive heart failure including edema or orthopnea.          Exam:   HEART:  regular without rubs, clicks, gallops, or murmurs. PMI is nondisplaced. Upstrokes are brisk. S1,S2 are heard.   LUNGS: clear bilaterally, airflow is brisk, no intercostal retraction or stridor is noted. No coughing is noted during visit.   GI: Abdomen is soft, without rebound, guarding or tenderness. Bowel sounds are appropriate. No renal bruits are heard.      #3   Follow-up on type 2 diabetes  Patient " "notes he checks his blood sugar from time to time.  Continue use background Lantus with preprandial Humalog.  Denies any hypoglycemic episodes.  Denies polyuria or polydipsia.  Has been aggressive with dietary management.          Exam:  As above        Patient has been interviewed, applicable history and applied review of systems have been performed.    Vital Signs:   /78 (BP Location: Right arm, Patient Position: Chair)   Pulse (!) 48   Temp (!) 96.1  F (35.6  C) (Temporal)   Resp 18   Ht 1.715 m (5' 7.5\")   Wt 92.9 kg (204 lb 14.4 oz)   SpO2 99%   BMI 31.62 kg/m        Decision Making    Problem and Complexity     1. Benign paroxysmal positional vertigo due to bilateral vestibular disorder  Continue meclizine.  Previous workup was unremarkable and symptoms are relatively intermittent and benign.  - meclizine (ANTIVERT) 25 MG tablet; Take 1 tablet (25 mg) by mouth 3 times daily as needed for dizziness.  Dispense: 60 tablet; Refill: 3    2. Symptomatic bradycardia  Recommend decreasing metoprolol to 12.5 mg daily.  Patient will notify me of his pulse rate in the upcoming week.  It may be necessary to discontinue the beta-blocker in its entirety.    3. Type 2 diabetes mellitus with stage 2 chronic kidney disease, with long-term current use of insulin (H)  Check hemoglobin A1c, continue current insulin and dietary program.  Patient will continue aspirin, statin, ACE inhibitor.  - HEMOGLOBIN A1C; Future  - HEMOGLOBIN A1C    4. Stage 3a chronic kidney disease (H)  Currently stable.  Check renal function and microalbumin.  - BASIC METABOLIC PANEL; Future  - Albumin Random Urine Quantitative with Creat Ratio; Future  - CBC with platelets; Future  - BASIC METABOLIC PANEL  - Albumin Random Urine Quantitative with Creat Ratio  - CBC with platelets    5. Hypertension goal BP (blood pressure) < 140/90  Decrease dose of metoprolol.  Current blood pressure is 120/78.  May not be necessary to replace/add any " additional antihypertensive therapy.  - metoprolol succinate ER (TOPROL XL) 25 MG 24 hr tablet; Take 0.5 tablets (12.5 mg) by mouth daily.  Dispense: 14 tablet; Refill: 0    6. Hyperlipidemia LDL goal <100  Continue statin therapy.  Check hepatic function  - atorvastatin (LIPITOR) 40 MG tablet; Take 1 tablet (40 mg) by mouth daily (with dinner).  Dispense: 90 tablet; Refill: 3  - Lipid panel reflex to direct LDL Fasting; Future  - Hepatic panel (Albumin, ALT, AST, Bili, Alk Phos, TP); Future  - Hepatic panel (Albumin, ALT, AST, Bili, Alk Phos, TP)    7. Atherosclerosis of native coronary artery of native heart with stable angina pectoris (H)  Stable.  Follow-up with cardiology as scheduled  - Lipid panel reflex to direct LDL Non-fasting; Future  - Lipid panel reflex to direct LDL Non-fasting    8. Medication management                                  FOLLOW UP   I have asked the patient to make an appointment for followup with me in 2 weeks to check his pulse and blood pressure and to continue our ongoing longitudinal care.    Regarding routine vaccinations:  I have reviewed the patient's vaccination schedule and discussed the benefits of prophylactic vaccination in detail.  I recommend the patient contact their pharmacist for vaccinations.  Discussed that most insurance companies now favor reimbursement to the pharmacies and it will financially behoove the patient to have vaccinations performed at their pharmacy.        I have carefully explained the diagnosis and treatment options to the patient.  The patient has displayed an understanding of the above, and all subsequent questions were answered.      DO CAREN Phillips    Portions of this note were produced using [employer]  Although every attempt at real-time proof reading has been made, occasional grammar/syntax errors may have been missed.

## 2024-10-08 NOTE — LETTER
October 18, 2024      Pj Zhao  [address] [facility] [address]        Dear ,    We are writing to inform you of your test results.    Microalbumin is slightly elevated suggesting some scant protein loss through the kidneys.   The cholesterol is within acceptable limits.   Chemistry panel shows a slight decrease in your chronically decreased kidney function.   Potassium level is normal.   Blood cell count is within acceptable limits.  There is no evidence of anemia or leukemia.   The liver tests are within acceptable limits.   The glycosylated hemoglobin is within acceptable limits suggesting adequate blood sugar control.   At this time, I would recommend decreasing your lisinopril to 20 mg just once daily.  I would also recommend rechecking the lab work in 1 month.     Resulted Orders   Lipid panel reflex to direct LDL Non-fasting   Result Value Ref Range    Cholesterol 94 <200 mg/dL    Triglycerides 104 <150 mg/dL    Direct Measure HDL 28 (L) >=40 mg/dL    LDL Cholesterol Calculated 45 <100 mg/dL    Non HDL Cholesterol 66 <130 mg/dL    Patient Fasting > 8hrs? Yes     Narrative    Cholesterol  Desirable: < 200 mg/dL  Borderline High: 200 - 239 mg/dL  High: >= 240 mg/dL    Triglycerides  Normal: < 150 mg/dL  Borderline High: 150 - 199 mg/dL  High: 200-499 mg/dL  Very High: >= 500 mg/dL    Direct Measure HDL  Female: >= 50 mg/dL   Male: >= 40 mg/dL    LDL Cholesterol  Desirable: < 100 mg/dL  Above Desirable: 100 - 129 mg/dL   Borderline High: 130 - 159 mg/dL   High:  160 - 189 mg/dL   Very High: >= 190 mg/dL    Non HDL Cholesterol  Desirable: < 130 mg/dL  Above Desirable: 130 - 159 mg/dL  Borderline High: 160 - 189 mg/dL  High: 190 - 219 mg/dL  Very High: >= 220 mg/dL   HEMOGLOBIN A1C   Result Value Ref Range    Estimated Average Glucose 111 <117 mg/dL    Hemoglobin A1C 5.5 <5.7 %      Comment:      Normal <5.7%   Prediabetes 5.7-6.4%    Diabetes 6.5% or higher     Note: Adopted from ADA  consensus guidelines.   BASIC METABOLIC PANEL   Result Value Ref Range    Sodium 139 135 - 145 mmol/L    Potassium 4.7 3.4 - 5.3 mmol/L    Chloride 103 98 - 107 mmol/L    Carbon Dioxide (CO2) 27 22 - 29 mmol/L    Anion Gap 9 7 - 15 mmol/L    Urea Nitrogen 23.1 (H) 8.0 - 23.0 mg/dL    Creatinine 1.62 (H) 0.67 - 1.17 mg/dL    GFR Estimate 41 (L) >60 mL/min/1.73m2      Comment:      eGFR calculated using 2021 CKD-EPI equation.    Calcium 9.0 8.8 - 10.4 mg/dL      Comment:      Reference intervals for this test were updated on 7/16/2024 to reflect our healthy population more accurately. There may be differences in the flagging of prior results with similar values performed with this method. Those prior results can be interpreted in the context of the updated reference intervals.    Glucose 94 70 - 99 mg/dL    Patient Fasting > 8hrs? Yes    Albumin Random Urine Quantitative with Creat Ratio   Result Value Ref Range    Creatinine Urine mg/dL 157.3 mg/dL      Comment:      The reference ranges have not been established in urine creatinine. The results should be integrated into the clinical context for interpretation.    Albumin Urine mg/L 31.3 mg/L      Comment:      The reference ranges have not been established in urine albumin. The results should be integrated into the clinical context for interpretation.    Albumin Urine mg/g Cr 19.90 (H) 0.00 - 17.00 mg/g Cr      Comment:      Microalbuminuria is defined as an albumin:creatinine ratio of 17 to 299 for males and 25 to 299 for females. A ratio of albumin:creatinine of 300 or higher is indicative of overt proteinuria.  Due to biologic variability, positive results should be confirmed by a second, first-morning random or 24-hour timed urine specimen. If there is discrepancy, a third specimen is recommended. When 2 out of 3 results are in the microalbuminuria range, this is evidence for incipient nephropathy and warrants increased efforts at glucose control, blood pressure  control, and institution of therapy with an angiotensin-converting-enzyme (ACE) inhibitor (if the patient can tolerate it).     CBC with platelets   Result Value Ref Range    WBC Count 5.4 4.0 - 11.0 10e3/uL    RBC Count 3.85 (L) 4.40 - 5.90 10e6/uL    Hemoglobin 12.6 (L) 13.3 - 17.7 g/dL    Hematocrit 37.8 (L) 40.0 - 53.0 %    MCV 98 78 - 100 fL    MCH 32.7 26.5 - 33.0 pg    MCHC 33.3 31.5 - 36.5 g/dL    RDW 12.9 10.0 - 15.0 %    Platelet Count 168 150 - 450 10e3/uL   Hepatic panel (Albumin, ALT, AST, Bili, Alk Phos, TP)   Result Value Ref Range    Protein Total 7.5 6.4 - 8.3 g/dL    Albumin 4.3 3.5 - 5.2 g/dL    Bilirubin Total 0.5 <=1.2 mg/dL    Alkaline Phosphatase 99 40 - 150 U/L    AST 20 0 - 45 U/L    ALT 18 0 - 70 U/L    Bilirubin Direct <0.20 0.00 - 0.30 mg/dL       If you have any questions or concerns, please call the clinic at the number listed above.       Sincerely,      Porfirio Burleson DO

## 2024-10-09 ENCOUNTER — NURSE TRIAGE (OUTPATIENT)
Dept: INTERNAL MEDICINE | Facility: CLINIC | Age: 84
End: 2024-10-09
Payer: COMMERCIAL

## 2024-10-09 NOTE — TELEPHONE ENCOUNTER
S: Patient requesting advice for Metoprolol Succinate 25mg    B: OV yesterday with PCP and Metoprolol was decreased  to 12.5mg    A: Patient reporting that he is unable to split the 25mg Metoprolol Succinate pills in half with his pill cutter. He states the pills just crumble. Patient advised to try to use his fingers to snap the pills rather than cut the pills, he isn't sure he can handle the small pill well enough to do this.  He reported he would still try this. He is questioning if he can just stop them all together, as his heart-rate is so slow, or he wants to know if he can take this medication every-other day.    R: Please advise if patient can stop taking the metoprolol or if he can switch to every other day due to difficulty breaking pills in half.     Yamini Patel RN on 10/9/2024 at 4:10 PM

## 2024-10-10 NOTE — TELEPHONE ENCOUNTER
Writer contacted patient.  Writer reviewed information below from provider.     Patient will follow up next week with BP and pulse reading.     Patient verbalized understanding, agreeable to plan and no further questions at this time.    Carlee Minor RN on 10/10/2024 at 10:09 AM

## 2024-10-16 ENCOUNTER — ALLIED HEALTH/NURSE VISIT (OUTPATIENT)
Dept: FAMILY MEDICINE | Facility: CLINIC | Age: 84
End: 2024-10-16
Payer: COMMERCIAL

## 2024-10-16 VITALS — DIASTOLIC BLOOD PRESSURE: 64 MMHG | OXYGEN SATURATION: 99 % | SYSTOLIC BLOOD PRESSURE: 114 MMHG | HEART RATE: 64 BPM

## 2024-10-16 DIAGNOSIS — Z01.30 BLOOD PRESSURE CHECK: Primary | ICD-10-CM

## 2024-10-16 PROCEDURE — 99207 PR NO CHARGE NURSE ONLY: CPT

## 2024-10-16 NOTE — PROGRESS NOTES
Patient came in for follow up blood pressure today per provider.    Blood pressure was within range.  Karli Helms RN on 10/16/2024 at 2:34 PM

## 2024-10-18 DIAGNOSIS — E11.8 TYPE 2 DIABETES MELLITUS WITH COMPLICATION (H): ICD-10-CM

## 2024-10-18 RX ORDER — LISINOPRIL 20 MG/1
20 TABLET ORAL DAILY
COMMUNITY
Start: 2024-10-18

## 2024-10-18 NOTE — RESULT ENCOUNTER NOTE
Please call the patient to confirm he has received this.  Thank you.  Benjy          Dear Pj, your recent test results are attached.    Microalbumin is slightly elevated suggesting some scant protein loss through the kidneys.  The cholesterol is within acceptable limits.  Chemistry panel shows a slight decrease in your chronically decreased kidney function.  Potassium level is normal.  Blood cell count is within acceptable limits.  There is no evidence of anemia or leukemia.  The liver tests are within acceptable limits.  The glycosylated hemoglobin is within acceptable limits suggesting adequate blood sugar control.  At this time, I would recommend decreasing your lisinopril to 20 mg just once daily.  I would also recommend rechecking the lab work in 1 month.    You will be contacted with any outstanding results as they are available.  Feel free to contact me via the office or My Chart if you have any questions regarding the above.

## 2024-10-22 ENCOUNTER — OFFICE VISIT (OUTPATIENT)
Dept: INTERNAL MEDICINE | Facility: CLINIC | Age: 84
End: 2024-10-22
Payer: COMMERCIAL

## 2024-10-22 VITALS
DIASTOLIC BLOOD PRESSURE: 72 MMHG | HEIGHT: 68 IN | TEMPERATURE: 97.8 F | OXYGEN SATURATION: 96 % | HEART RATE: 78 BPM | BODY MASS INDEX: 30.98 KG/M2 | WEIGHT: 204.44 LBS | SYSTOLIC BLOOD PRESSURE: 128 MMHG | RESPIRATION RATE: 16 BRPM

## 2024-10-22 DIAGNOSIS — N18.32 TYPE 2 DIABETES MELLITUS WITH STAGE 3B CHRONIC KIDNEY DISEASE, WITH LONG-TERM CURRENT USE OF INSULIN (H): Primary | ICD-10-CM

## 2024-10-22 DIAGNOSIS — Z79.4 TYPE 2 DIABETES MELLITUS WITH STAGE 3B CHRONIC KIDNEY DISEASE, WITH LONG-TERM CURRENT USE OF INSULIN (H): Primary | ICD-10-CM

## 2024-10-22 DIAGNOSIS — E11.22 TYPE 2 DIABETES MELLITUS WITH STAGE 3B CHRONIC KIDNEY DISEASE, WITH LONG-TERM CURRENT USE OF INSULIN (H): Primary | ICD-10-CM

## 2024-10-22 DIAGNOSIS — E78.5 HYPERLIPIDEMIA LDL GOAL <100: ICD-10-CM

## 2024-10-22 DIAGNOSIS — R00.1 SYMPTOMATIC BRADYCARDIA: ICD-10-CM

## 2024-10-22 PROBLEM — F33.42 RECURRENT MAJOR DEPRESSIVE DISORDER, IN FULL REMISSION (H): Status: RESOLVED | Noted: 2023-10-19 | Resolved: 2024-10-22

## 2024-10-22 PROBLEM — C18.9 MALIGNANT NEOPLASM OF COLON, UNSPECIFIED PART OF COLON (H): Status: RESOLVED | Noted: 2018-01-30 | Resolved: 2024-10-22

## 2024-10-22 PROBLEM — D69.6 THROMBOCYTOPENIA (H): Status: RESOLVED | Noted: 2022-07-27 | Resolved: 2024-10-22

## 2024-10-22 PROBLEM — I73.9 INTERMITTENT CLAUDICATION (H): Status: RESOLVED | Noted: 2021-03-09 | Resolved: 2024-10-22

## 2024-10-22 LAB
ANION GAP SERPL CALCULATED.3IONS-SCNC: 10 MMOL/L (ref 7–15)
BUN SERPL-MCNC: 21.4 MG/DL (ref 8–23)
CALCIUM SERPL-MCNC: 8.8 MG/DL (ref 8.8–10.4)
CHLORIDE SERPL-SCNC: 102 MMOL/L (ref 98–107)
CHOLEST SERPL-MCNC: 104 MG/DL
CREAT SERPL-MCNC: 1.65 MG/DL (ref 0.67–1.17)
EGFRCR SERPLBLD CKD-EPI 2021: 40 ML/MIN/1.73M2
FASTING STATUS PATIENT QL REPORTED: YES
FASTING STATUS PATIENT QL REPORTED: YES
GLUCOSE SERPL-MCNC: 119 MG/DL (ref 70–99)
HCO3 SERPL-SCNC: 25 MMOL/L (ref 22–29)
HDLC SERPL-MCNC: 29 MG/DL
LDLC SERPL CALC-MCNC: 50 MG/DL
NONHDLC SERPL-MCNC: 75 MG/DL
POTASSIUM SERPL-SCNC: 4.7 MMOL/L (ref 3.4–5.3)
SODIUM SERPL-SCNC: 137 MMOL/L (ref 135–145)
TRIGL SERPL-MCNC: 126 MG/DL

## 2024-10-22 PROCEDURE — 80048 BASIC METABOLIC PNL TOTAL CA: CPT | Performed by: INTERNAL MEDICINE

## 2024-10-22 PROCEDURE — G2211 COMPLEX E/M VISIT ADD ON: HCPCS | Performed by: INTERNAL MEDICINE

## 2024-10-22 PROCEDURE — 36415 COLL VENOUS BLD VENIPUNCTURE: CPT | Performed by: INTERNAL MEDICINE

## 2024-10-22 PROCEDURE — 99214 OFFICE O/P EST MOD 30 MIN: CPT | Performed by: INTERNAL MEDICINE

## 2024-10-22 PROCEDURE — 80061 LIPID PANEL: CPT | Performed by: INTERNAL MEDICINE

## 2024-10-22 NOTE — LETTER
October 25, 2024      Pj Zhao  [id number] [facility] [id number]        Dear ,    We are writing to inform you of your test results.      The cholesterol is within acceptable limits.   The chemistry panel shows decreased but stable kidney function.  Blood sugar slightly elevated at 119.     Resulted Orders   Lipid panel reflex to direct LDL Fasting   Result Value Ref Range    Cholesterol 104 <200 mg/dL    Triglycerides 126 <150 mg/dL    Direct Measure HDL 29 (L) >=40 mg/dL    LDL Cholesterol Calculated 50 <100 mg/dL    Non HDL Cholesterol 75 <130 mg/dL    Patient Fasting > 8hrs? Yes     Narrative    Cholesterol  Desirable: < 200 mg/dL  Borderline High: 200 - 239 mg/dL  High: >= 240 mg/dL    Triglycerides  Normal: < 150 mg/dL  Borderline High: 150 - 199 mg/dL  High: 200-499 mg/dL  Very High: >= 500 mg/dL    Direct Measure HDL  Female: >= 50 mg/dL   Male: >= 40 mg/dL    LDL Cholesterol  Desirable: < 100 mg/dL  Above Desirable: 100 - 129 mg/dL   Borderline High: 130 - 159 mg/dL   High:  160 - 189 mg/dL   Very High: >= 190 mg/dL    Non HDL Cholesterol  Desirable: < 130 mg/dL  Above Desirable: 130 - 159 mg/dL  Borderline High: 160 - 189 mg/dL  High: 190 - 219 mg/dL  Very High: >= 220 mg/dL   Basic metabolic panel  (Ca, Cl, CO2, Creat, Gluc, K, Na, BUN)   Result Value Ref Range    Sodium 137 135 - 145 mmol/L    Potassium 4.7 3.4 - 5.3 mmol/L    Chloride 102 98 - 107 mmol/L    Carbon Dioxide (CO2) 25 22 - 29 mmol/L    Anion Gap 10 7 - 15 mmol/L    Urea Nitrogen 21.4 8.0 - 23.0 mg/dL    Creatinine 1.65 (H) 0.67 - 1.17 mg/dL    GFR Estimate 40 (L) >60 mL/min/1.73m2      Comment:      eGFR calculated using 2021 CKD-EPI equation.    Calcium 8.8 8.8 - 10.4 mg/dL      Comment:      Reference intervals for this test were updated on 7/16/2024 to reflect our healthy population more accurately. There may be differences in the flagging of prior results with similar values performed with this method. Those prior  results can be interpreted in the context of the updated reference intervals.    Glucose 119 (H) 70 - 99 mg/dL    Patient Fasting > 8hrs? Yes        If you have any questions or concerns, please call the clinic at the number listed above.       Sincerely,      Porfirio Burleson, DO

## 2024-10-22 NOTE — PROGRESS NOTES
"    Cece Oliva is a 83 year old, presenting for the following health issues:  No chief complaint on file.      10/22/2024     7:58 AM   Additional Questions   Roomed by Vianney LEE reviewed including:             Complaint, History of Chief Complaint, Corresponding Review of Systems, and Complaint Specific Physical Examination.    #1   Follow-up on bradycardia.  Patient was unable to break 25 mg Toprol's in half.  Therefore discontinued the beta-blocker.  Pulse now 78.  Blood pressure 120/72.  Patient feeling much better.  Denies shortness of breath, lightheadedness or dizziness.  Denies any chest pain or edema.          Exam:   HEART:  regular without rubs, clicks, gallops, or murmurs. PMI is nondisplaced. Upstrokes are brisk. S1,S2 are heard.   LUNGS: clear bilaterally, airflow is brisk, no intercostal retraction or stridor is noted. No coughing is noted during visit.      #2   Follow-up on decreased renal function.  GFR dropped to 41.  Had patient decrease ACE inhibitor dosage.  Will recheck today.  Denies any symptoms.          Exam:  No edema noted.      #3   Follow-up on type 2 diabetes.  A1c excellent.  Checks blood sugar daily.  Very diet compliant.  Denies neuropathy or paresthesias.          Exam:   NEURO: Pt is alert and appropriate. No neurologic lateralization is noted. Cranial nerves 2-12 are intact. Peripheral sensory and motor function are grossly normal.    SKIN:  warm and dry. No erythema, or rashes are noted. No specific lesions of concern are noted.         Patient has been interviewed, applicable history and applied review of systems have been performed.    Vital Signs:   /72   Pulse 78   Temp 97.8  F (36.6  C) (Temporal)   Resp 16   Ht 1.735 m (5' 8.31\")   Wt 92.7 kg (204 lb 7 oz)   SpO2 96%   BMI 30.81 kg/m        Decision Making    Problem and Complexity     1. Symptomatic bradycardia  Patient will stay off the beta-blocker for now.  Recommend checking " home blood pressure and pulse and recording.      2. Type 2 diabetes mellitus with stage 3b chronic kidney disease, with long-term current use of insulin (H)  Continue current diabetic management.  Recheck renal function.    3. Hyperlipidemia LDL goal <100  Continue low-fat diet.  - Lipid panel reflex to direct LDL Fasting                                FOLLOW UP   I have asked the patient to make an appointment for followup with me in 3 months or as predicated by lab results    Regarding routine vaccinations:  I have reviewed the patient's vaccination schedule and discussed the benefits of prophylactic vaccination in detail.  I recommend the patient contact their pharmacist for vaccinations.  Discussed that most insurance companies now favor reimbursement to the pharmacies and it will financially behoove the patient to have vaccinations performed at their pharmacy.        I have carefully explained the diagnosis and treatment options to the patient.  The patient has displayed an understanding of the above, and all subsequent questions were answered.      DO CAREN Phillips    Portions of this note were produced using [employer]  Although every attempt at real-time proof reading has been made, occasional grammar/syntax errors may have been missed.

## 2024-10-25 NOTE — RESULT ENCOUNTER NOTE
Dear Pj, your recent test results are attached.    The cholesterol is within acceptable limits.  The chemistry panel shows decreased but stable kidney function.  Blood sugar slightly elevated at 119.    You will be contacted with any outstanding results as they are available.  Feel free to contact me via the office or My Chart if you have any questions regarding the above.

## 2024-10-27 ENCOUNTER — APPOINTMENT (OUTPATIENT)
Dept: CT IMAGING | Facility: CLINIC | Age: 84
End: 2024-10-27
Attending: STUDENT IN AN ORGANIZED HEALTH CARE EDUCATION/TRAINING PROGRAM
Payer: COMMERCIAL

## 2024-10-27 ENCOUNTER — APPOINTMENT (OUTPATIENT)
Dept: GENERAL RADIOLOGY | Facility: CLINIC | Age: 84
End: 2024-10-27
Attending: STUDENT IN AN ORGANIZED HEALTH CARE EDUCATION/TRAINING PROGRAM
Payer: COMMERCIAL

## 2024-10-27 ENCOUNTER — HOSPITAL ENCOUNTER (EMERGENCY)
Facility: CLINIC | Age: 84
Discharge: HOME | End: 2024-10-27
Attending: STUDENT IN AN ORGANIZED HEALTH CARE EDUCATION/TRAINING PROGRAM | Admitting: STUDENT IN AN ORGANIZED HEALTH CARE EDUCATION/TRAINING PROGRAM
Payer: COMMERCIAL

## 2024-10-27 VITALS
DIASTOLIC BLOOD PRESSURE: 66 MMHG | HEART RATE: 81 BPM | WEIGHT: 204 LBS | RESPIRATION RATE: 23 BRPM | SYSTOLIC BLOOD PRESSURE: 119 MMHG | HEIGHT: 69 IN | TEMPERATURE: 97.7 F | BODY MASS INDEX: 30.21 KG/M2 | OXYGEN SATURATION: 98 %

## 2024-10-27 DIAGNOSIS — N17.9 AKI (ACUTE KIDNEY INJURY) (H): ICD-10-CM

## 2024-10-27 DIAGNOSIS — I21.4 NSTEMI (NON-ST ELEVATED MYOCARDIAL INFARCTION) (H): ICD-10-CM

## 2024-10-27 LAB
ALBUMIN SERPL BCG-MCNC: 3.3 G/DL (ref 3.5–5.2)
ALP SERPL-CCNC: 88 U/L (ref 40–150)
ALT SERPL W P-5'-P-CCNC: 58 U/L (ref 0–70)
ANION GAP SERPL CALCULATED.3IONS-SCNC: 17 MMOL/L (ref 7–15)
AST SERPL W P-5'-P-CCNC: 89 U/L (ref 0–45)
BASOPHILS # BLD AUTO: 0 10E3/UL (ref 0–0.2)
BASOPHILS NFR BLD AUTO: 0 %
BILIRUB SERPL-MCNC: 0.7 MG/DL
BUN SERPL-MCNC: 90.9 MG/DL (ref 8–23)
CALCIUM SERPL-MCNC: 8.9 MG/DL (ref 8.8–10.4)
CHLORIDE SERPL-SCNC: 98 MMOL/L (ref 98–107)
CK SERPL-CCNC: 153 U/L (ref 39–308)
CREAT SERPL-MCNC: 2.83 MG/DL (ref 0.67–1.17)
EGFRCR SERPLBLD CKD-EPI 2021: 21 ML/MIN/1.73M2
EOSINOPHIL # BLD AUTO: 0.1 10E3/UL (ref 0–0.7)
EOSINOPHIL NFR BLD AUTO: 1 %
ERYTHROCYTE [DISTWIDTH] IN BLOOD BY AUTOMATED COUNT: 13.1 % (ref 10–15)
GLUCOSE BLDC GLUCOMTR-MCNC: 195 MG/DL (ref 70–99)
GLUCOSE SERPL-MCNC: 206 MG/DL (ref 70–99)
HCO3 SERPL-SCNC: 18 MMOL/L (ref 22–29)
HCT VFR BLD AUTO: 36.9 % (ref 40–53)
HGB BLD-MCNC: 12.6 G/DL (ref 13.3–17.7)
HOLD SPECIMEN: NORMAL
IMM GRANULOCYTES # BLD: 0.1 10E3/UL
IMM GRANULOCYTES NFR BLD: 1 %
LACTATE SERPL-SCNC: 2.3 MMOL/L (ref 0.7–2)
LIPASE SERPL-CCNC: 54 U/L (ref 13–60)
LYMPHOCYTES # BLD AUTO: 0.8 10E3/UL (ref 0.8–5.3)
LYMPHOCYTES NFR BLD AUTO: 7 %
MCH RBC QN AUTO: 32.1 PG (ref 26.5–33)
MCHC RBC AUTO-ENTMCNC: 34.1 G/DL (ref 31.5–36.5)
MCV RBC AUTO: 94 FL (ref 78–100)
MONOCYTES # BLD AUTO: 0.7 10E3/UL (ref 0–1.3)
MONOCYTES NFR BLD AUTO: 6 %
NEUTROPHILS # BLD AUTO: 9.8 10E3/UL (ref 1.6–8.3)
NEUTROPHILS NFR BLD AUTO: 85 %
NRBC # BLD AUTO: 0 10E3/UL
NRBC BLD AUTO-RTO: 0 /100
PLATELET # BLD AUTO: 139 10E3/UL (ref 150–450)
POTASSIUM SERPL-SCNC: 5.2 MMOL/L (ref 3.4–5.3)
PROT SERPL-MCNC: 7.3 G/DL (ref 6.4–8.3)
RBC # BLD AUTO: 3.93 10E6/UL (ref 4.4–5.9)
SODIUM SERPL-SCNC: 133 MMOL/L (ref 135–145)
TROPONIN T SERPL HS-MCNC: 1423 NG/L
TROPONIN T SERPL HS-MCNC: 1528 NG/L
WBC # BLD AUTO: 11.6 10E3/UL (ref 4–11)

## 2024-10-27 PROCEDURE — 99285 EMERGENCY DEPT VISIT HI MDM: CPT | Performed by: STUDENT IN AN ORGANIZED HEALTH CARE EDUCATION/TRAINING PROGRAM

## 2024-10-27 PROCEDURE — 84484 ASSAY OF TROPONIN QUANT: CPT | Performed by: STUDENT IN AN ORGANIZED HEALTH CARE EDUCATION/TRAINING PROGRAM

## 2024-10-27 PROCEDURE — 82550 ASSAY OF CK (CPK): CPT | Performed by: STUDENT IN AN ORGANIZED HEALTH CARE EDUCATION/TRAINING PROGRAM

## 2024-10-27 PROCEDURE — 83690 ASSAY OF LIPASE: CPT | Performed by: STUDENT IN AN ORGANIZED HEALTH CARE EDUCATION/TRAINING PROGRAM

## 2024-10-27 PROCEDURE — 85004 AUTOMATED DIFF WBC COUNT: CPT | Performed by: STUDENT IN AN ORGANIZED HEALTH CARE EDUCATION/TRAINING PROGRAM

## 2024-10-27 PROCEDURE — 83605 ASSAY OF LACTIC ACID: CPT | Performed by: STUDENT IN AN ORGANIZED HEALTH CARE EDUCATION/TRAINING PROGRAM

## 2024-10-27 PROCEDURE — 99285 EMERGENCY DEPT VISIT HI MDM: CPT | Mod: 25 | Performed by: STUDENT IN AN ORGANIZED HEALTH CARE EDUCATION/TRAINING PROGRAM

## 2024-10-27 PROCEDURE — 250N000013 HC RX MED GY IP 250 OP 250 PS 637: Performed by: STUDENT IN AN ORGANIZED HEALTH CARE EDUCATION/TRAINING PROGRAM

## 2024-10-27 PROCEDURE — 82962 GLUCOSE BLOOD TEST: CPT

## 2024-10-27 PROCEDURE — 258N000003 HC RX IP 258 OP 636: Performed by: STUDENT IN AN ORGANIZED HEALTH CARE EDUCATION/TRAINING PROGRAM

## 2024-10-27 PROCEDURE — 84155 ASSAY OF PROTEIN SERUM: CPT | Performed by: STUDENT IN AN ORGANIZED HEALTH CARE EDUCATION/TRAINING PROGRAM

## 2024-10-27 PROCEDURE — 70450 CT HEAD/BRAIN W/O DYE: CPT

## 2024-10-27 PROCEDURE — 96374 THER/PROPH/DIAG INJ IV PUSH: CPT | Performed by: STUDENT IN AN ORGANIZED HEALTH CARE EDUCATION/TRAINING PROGRAM

## 2024-10-27 PROCEDURE — 96361 HYDRATE IV INFUSION ADD-ON: CPT | Performed by: STUDENT IN AN ORGANIZED HEALTH CARE EDUCATION/TRAINING PROGRAM

## 2024-10-27 PROCEDURE — 71045 X-RAY EXAM CHEST 1 VIEW: CPT

## 2024-10-27 PROCEDURE — 250N000011 HC RX IP 250 OP 636: Performed by: STUDENT IN AN ORGANIZED HEALTH CARE EDUCATION/TRAINING PROGRAM

## 2024-10-27 PROCEDURE — 73030 X-RAY EXAM OF SHOULDER: CPT | Mod: LT

## 2024-10-27 PROCEDURE — 36415 COLL VENOUS BLD VENIPUNCTURE: CPT | Performed by: STUDENT IN AN ORGANIZED HEALTH CARE EDUCATION/TRAINING PROGRAM

## 2024-10-27 RX ORDER — HEPARIN SODIUM 10000 [USP'U]/100ML
0-5000 INJECTION, SOLUTION INTRAVENOUS CONTINUOUS
Status: DISCONTINUED | OUTPATIENT
Start: 2024-10-27 | End: 2024-10-27 | Stop reason: HOSPADM

## 2024-10-27 RX ORDER — ASPIRIN 81 MG/1
324 TABLET, CHEWABLE ORAL ONCE
Status: COMPLETED | OUTPATIENT
Start: 2024-10-27 | End: 2024-10-27

## 2024-10-27 RX ADMIN — ASPIRIN 324 MG: 81 TABLET, CHEWABLE ORAL at 19:15

## 2024-10-27 RX ADMIN — HEPARIN SODIUM 1100 UNITS/HR: 10000 INJECTION, SOLUTION INTRAVENOUS at 19:22

## 2024-10-27 RX ADMIN — SODIUM CHLORIDE 1000 ML: 9 INJECTION, SOLUTION INTRAVENOUS at 19:17

## 2024-10-27 ASSESSMENT — ACTIVITIES OF DAILY LIVING (ADL)
ADLS_ACUITY_SCORE: 0

## 2024-10-27 ASSESSMENT — COLUMBIA-SUICIDE SEVERITY RATING SCALE - C-SSRS
6. HAVE YOU EVER DONE ANYTHING, STARTED TO DO ANYTHING, OR PREPARED TO DO ANYTHING TO END YOUR LIFE?: NO
2. HAVE YOU ACTUALLY HAD ANY THOUGHTS OF KILLING YOURSELF IN THE PAST MONTH?: NO
1. IN THE PAST MONTH, HAVE YOU WISHED YOU WERE DEAD OR WISHED YOU COULD GO TO SLEEP AND NOT WAKE UP?: NO

## 2024-10-27 NOTE — ED PROVIDER NOTES
History     Chief Complaint   Patient presents with    Fatigue    Back Pain     HPI  Pj Zhao is a 83 year old male who presents to the emergency department with his wife for feeling rundown and fatigued over the past 5 days.  Triage note mentions back pain, patient denies this, states he has no back pain today or in the past few days.  He does mention nonspecific left shoulder soreness, states that he has been spending a lot of time in the chair in the living room and sleeping illness, when he needs to get up his wife has been pulling him by his left arm to get up and feels like this is related.  The confusion that family has noticed, the wife has noticed situational confusion over the past 5 days that seems to be intermittent.  The children visited the house today and with this situational confusion recommended an evaluation in the ER.    Allergies:  No Known Allergies    Problem List:    Patient Active Problem List    Diagnosis Date Noted    CKD (chronic kidney disease) stage 3, GFR 30-59 ml/min (H) 10/18/2019     Priority: Medium    Vitamin B12 deficiency (non anemic) 02/01/2018     Priority: Medium    Type 2 diabetes mellitus with stage 2 chronic kidney disease, with long-term current use of insulin (H) 01/06/2017     Priority: Medium    Obesity, Class I, BMI 30-34.9 10/26/2015     Priority: Medium    CKD (chronic kidney disease) stage 2, GFR 60-89 ml/min 10/26/2015     Priority: Medium    Chronic ischemic heart disease 11/29/2014     Priority: Medium     Problem list name updated by automated process. 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, right 11/28/2014     Priority: Medium    Acute ischemic stroke-  left christian 11/27/2014     Priority: Medium    DVT prophylaxis 11/27/2014     Priority: Medium    ED (erectile dysfunction) 10/03/2014     Priority: Medium    Hypertension goal BP (blood pressure) < 140/90 07/26/2012     Priority: Medium    Microalbuminuria 03/07/2011     Priority: Medium    Hyperlipidemia LDL goal <100 02/11/2011     Priority: Medium    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) 10/31/2010     Priority: Medium    Atherosclerosis of native coronary artery of native heart with stable angina pectoris (H) 06/30/2005     Priority: Medium    History of malignant neoplasm of large intestine 03/03/2003     Priority: Medium     Problem list name updated by automated process. Provider to review          Past Medical History:    Past Medical History:   Diagnosis Date    CKD (chronic kidney disease) stage 1, GFR 90 ml/min or greater     CKD (chronic kidney disease) stage 2, GFR 60-89 ml/min 10/26/2015    Diabetic eye exam (H) 10/19/13, 11/25/14    Impotence of organic origin     Microalbuminuria 3/7/2011    NONSPECIFIC MEDICAL HISTORY 1977    Obesity, Class I, BMI 30-34.9 10/26/2015    Other specified disease of nail     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) 10/31/2010    Type II or unspecified type diabetes mellitus without mention of complication, not stated as uncontrolled     Unspecified essential hypertension     Urinary calculus, unspecified 1990       Past Surgical History:    Past Surgical History:   Procedure Laterality Date    CARDIAC SURGERY  05/2018    COLONOSCOPY  03/06/07    repeat in 3-5 yrs.    COLONOSCOPY  6/28/2011    Procedure:COMBINED COLONOSCOPY, REMOVE TUMOR/POLYP/LESION BY SNARE; Surgeon:JOANN PATEL; Location: GI    HC REMOVAL OF TONSILS,<11 Y/O      Tonsils <10y.o.    LASER YAG CAPSULOTOMY Right 6/20/2024    Procedure: Laser YAG capsulotomy;  Surgeon: Bertram Dee MD;  Location:  OR    PHACOEMULSIFICATION WITH  STANDARD INTRAOCULAR LENS IMPLANT Right 1/15/2015    Procedure: PHACOEMULSIFICATION WITH STANDARD INTRAOCULAR LENS IMPLANT;  Surgeon: Jamarcus Ferrer MD;  Location: [facility] OR    PHACOEMULSIFICATION WITH STANDARD INTRAOCULAR LENS IMPLANT Left 2/19/2015    Procedure: PHACOEMULSIFICATION WITH STANDARD INTRAOCULAR LENS IMPLANT;  Surgeon: Jamarcus Ferrer MD;  Location: [facility] OR    ZZC APPENDECTOMY  3/11/2003    [facility] NONSPECIFIC PROCEDURE      Laser surgery for destruction of kidney stones    [facility] NONSPECIFIC PROCEDURE  3/11/2003    Lower anterior sigmoid resection with end-to-end anastomosis.         Family History:    Family History   Problem Relation Age of Onset    Diabetes Mother     Obesity Mother     Cancer Sister         Mouth       Social History:  Marital Status:   [2]  Social History     Tobacco Use    Smoking status: Never    Smokeless tobacco: Never   Vaping Use    Vaping status: Never Used   Substance Use Topics    Alcohol use: No     Alcohol/week: 0.0 standard drinks of alcohol    Drug use: No        Medications:    aspirin (ASA) 81 MG tablet  atorvastatin (LIPITOR) 40 MG tablet  blood glucose (ONETOUCH ULTRA) test strip  brimonidine (ALPHAGAN) 0.2 % ophthalmic solution  dorzolamide-timolol (COSOPT) 2-0.5 % ophthalmic solution  furosemide (LASIX) 20 MG tablet  gabapentin (NEURONTIN) 300 MG capsule  halobetasol (ULTRAVATE) 0.05 % external cream  HUMALOG KWIKPEN 100 UNIT/ML soln  insulin glargine (LANTUS SOLOSTAR) 100 UNIT/ML pen  insulin pen needle (B-D U/F) 31G X 8 MM miscellaneous  latanoprost (XALATAN) 0.005 % ophthalmic solution  lisinopril (ZESTRIL) 20 MG tablet  meclizine (ANTIVERT) 25 MG tablet  metFORMIN (GLUCOPHAGE) 500 MG tablet  timolol maleate (TIMOPTIC) 0.5 % ophthalmic solution          Review of Systems  Gen: No fevers, no unintentional weight changes  Head and neck: No headache, no neck pain  Eye: No eye pain, no vision changes  Respiratory: No shortness of breath, no  "cough  Cardiovascular: No chest pain, no palpitations  Abdominal: No vomiting, no constipation, no diarrhea  Urinary: No dysuria, no hematuria  Musculoskeletal: No joint redness, no trauma  Neurologic: No confusion, no weakness, no sensation changes  Psychiatric: No suicidal ideation, no homicidal ideation    Physical Exam   BP: 118/65  Pulse: 88  Temp: 97.7  F (36.5  C)  Resp: 22  Height: 175.3 cm (5' 9\")  Weight: 92.5 kg (204 lb)  SpO2: 97 %      Physical Exam  General: Alert, awake, no distress  Head: Normocephalic, atraumatic  Eyes: Grossly intact extraocular movements, conjunctiva clear, pupils equal   Mouth: Mucous membranes moist  Neck: Supple, grossly full range of motion, no observable masses  Respiratory: No distress, speaks in full sentences, clear to auscultation bilaterally  Cardiac: S1 and S2 cardiac sounds appreciated, normal rate, no edema  Abdominal: Soft, not distended, no tenderness appreciated  Neurologic: Oriented x 4, normal level of consciousness, speech is clear and appropriate, moving all extremities grossly normal and symmetric, cranial nerves II through XII grossly intact, cerebellar signs are intact,  Musculoskeletal: No tenderness or inflammatory changes of the back, no particular tenderness of the left shoulder or pain with range of motion, intact neurovascular exam the left upper extremity, no inflammatory changes of the left shoulder  Skin: No gross rashes or lesions  Psych: Normal mood and appropriate for situation        ED Course        Procedures                  No results found for this or any previous visit (from the past 24 hours).    Medications   aspirin (ASA) chewable tablet 324 mg (324 mg Oral $Given 10/27/24 1915)   sodium chloride 0.9% BOLUS 1,000 mL (0 mLs Intravenous Stopped 10/27/24 2130)   heparin loading dose for LOW INTENSITY TREATMENT * Give BEFORE starting heparin infusion (5,550 Units Intravenous $Given 10/27/24 1921)       Assessments & Plan (with Medical " Decision Making)     Vital signs reassuring.  Patient does not have any acute focal neurologic deficits.  Reviewed his EMR that is notable for acute stroke in the past, states for the most part he healed back to his baseline functional status, sometimes he has minimal weakness of the left leg.  We will perform an ER workup for altered mental status today.  His vital signs are reassuring.  In the bed he states he is comfortable and does not need anything during workup.    EKG interpreted by myself shows a sinus rhythm with a rate of 87, QRS prolonged at 144 with evidence of right bundle branch block, no gross ischemic changes.  His initial troponin has resulted at 1500.  Discussed this case with interventional cardiology, after reviewing the patient's symptoms, troponin and EKG they feel like he does not have criteria for emergent catheterization however he will need to be transferred to a facility with cardiology.  During his time in the ER we did check a second troponin that has down trended to 1400.  He continues to have a lack of chest pain or other ACS equivalent type symptoms.  Does have mild elevation of his creatinine compared to baseline, baseline roughly 1.6, today 2.8.  This will need to be further worked up had an outside facility.  I did order a urinalysis that was not provided during his time here.  He states he continues to urinate and does not appear to have an indication for emergent hemodialysis.  Discussed this case with the hospitalist at the outside facility and they are acceptable for transfer.  During his time here we did give a full dose aspirin and start him on heparin.  I did discuss his ER workup today with him and all questions were answered.  We also did perform a CT head, chest x-ray and left shoulder x-ray that did not show any acute abnormalities during his time here.  He was transferred by EMS without any acute decompensation.    Discussed todays ER visit and current agreed upon  treatment plan. Education provided and all questions answered. Instructed to follow up with pcp to discuss ER visit, make sure they are doing well, and to follow up on any incidental findings. Encouraged to come back to the ER for re evaluation if worsening or any other concerns.     I have reviewed the nursing notes.    I have reviewed the findings, diagnosis, plan and need for follow up with the patient.        Discharge Medication List as of 10/27/2024  9:33 PM          Final diagnoses:   NSTEMI (non-ST elevated myocardial infarction) (H)   RICHIE (acute kidney injury) (H)       10/27/2024   [facility] EMERGENCY DEPT       Joseph Edwards MD  10/31/24 2026

## 2024-10-27 NOTE — ED TRIAGE NOTES
PT reports of fatigue since Wednesday, PT reports back pain as well. Wife reports that they have a hard time getting the PT up. PT with history of DM type 2. Denies Chest pain or SOB.

## 2024-10-31 DIAGNOSIS — I21.4 NON-ST ELEVATION MYOCARDIAL INFARCTION (NSTEMI) (H): Primary | ICD-10-CM

## 2024-11-02 ENCOUNTER — NURSE TRIAGE (OUTPATIENT)
Dept: NURSING | Facility: CLINIC | Age: 84
End: 2024-11-02
Payer: COMMERCIAL

## 2024-11-02 NOTE — TELEPHONE ENCOUNTER
"  Home Care is calling regarding an established patient with [facility].       Requesting orders from: Porfirio Burleson  Provider is following patient: Yes  Is this a 60-day recertification request?  No    Orders Requested    Skilled Nursing  Request for initial certification (first set of orders)   Frequency:  One x week for three weeks, every other week for three weeks and three PRN visits for new medications, heart attack and pneumonia  Message to PCP for approval    Physical Therapy  Request for initial evaluation and treatment (one time)   Verbal approval given per protocol     Occupational Therapy  Request for initial evaluation and treatment (one time)   Verbal approval given per protocol     Confirmed ok to leave a detailed message with call back.  Contact information confirmed and updated as needed.      FYI: MADDY Maddox reporting pt declined a home health aide.   Medication interaction: \"meclizine with potassium chloride flagged for interaction level 2, solid oral potassium capsules have an interaction with blood pressure medication please address\". Pt has been taking \"all of it\" for years per Varsha.     Maggy Alejandro RN   "

## 2024-11-04 NOTE — TELEPHONE ENCOUNTER
Detailed approval left on confidential home care voicemail    Karli Helms RN on 11/4/2024 at 9:33 AM

## 2024-11-04 NOTE — TELEPHONE ENCOUNTER
OK for verbal orders per Home Care request. OK to continue potassium with meclizine.  Brandt Villaseñor MD

## 2024-11-05 ENCOUNTER — OFFICE VISIT (OUTPATIENT)
Dept: FAMILY MEDICINE | Facility: CLINIC | Age: 84
End: 2024-11-05
Payer: COMMERCIAL

## 2024-11-05 ENCOUNTER — TELEPHONE (OUTPATIENT)
Dept: INTERNAL MEDICINE | Facility: CLINIC | Age: 84
End: 2024-11-05

## 2024-11-05 ENCOUNTER — TRANSCRIBE ORDERS (OUTPATIENT)
Dept: OTHER | Age: 84
End: 2024-11-05

## 2024-11-05 ENCOUNTER — HOSPITAL ENCOUNTER (OUTPATIENT)
Dept: GENERAL RADIOLOGY | Facility: CLINIC | Age: 84
Discharge: HOME | End: 2024-11-05
Payer: COMMERCIAL

## 2024-11-05 VITALS
SYSTOLIC BLOOD PRESSURE: 120 MMHG | DIASTOLIC BLOOD PRESSURE: 60 MMHG | RESPIRATION RATE: 16 BRPM | BODY MASS INDEX: 27.67 KG/M2 | TEMPERATURE: 97.4 F | HEIGHT: 69 IN | WEIGHT: 186.8 LBS | HEART RATE: 69 BPM | OXYGEN SATURATION: 96 %

## 2024-11-05 DIAGNOSIS — R09.81 NASAL CONGESTION: ICD-10-CM

## 2024-11-05 DIAGNOSIS — Z79.899 MEDICATION MANAGEMENT: ICD-10-CM

## 2024-11-05 DIAGNOSIS — N18.2 TYPE 2 DIABETES MELLITUS WITH STAGE 2 CHRONIC KIDNEY DISEASE, WITH LONG-TERM CURRENT USE OF INSULIN (H): ICD-10-CM

## 2024-11-05 DIAGNOSIS — H69.93 DYSFUNCTION OF BOTH EUSTACHIAN TUBES: ICD-10-CM

## 2024-11-05 DIAGNOSIS — J18.9 PNEUMONIA DUE TO INFECTIOUS ORGANISM, UNSPECIFIED LATERALITY, UNSPECIFIED PART OF LUNG: ICD-10-CM

## 2024-11-05 DIAGNOSIS — I21.4 NSTEMI (NON-ST ELEVATED MYOCARDIAL INFARCTION) (H): Primary | ICD-10-CM

## 2024-11-05 DIAGNOSIS — N18.31 STAGE 3A CHRONIC KIDNEY DISEASE (H): ICD-10-CM

## 2024-11-05 DIAGNOSIS — Z79.4 TYPE 2 DIABETES MELLITUS WITH STAGE 2 CHRONIC KIDNEY DISEASE, WITH LONG-TERM CURRENT USE OF INSULIN (H): ICD-10-CM

## 2024-11-05 DIAGNOSIS — E11.22 TYPE 2 DIABETES MELLITUS WITH STAGE 2 CHRONIC KIDNEY DISEASE, WITH LONG-TERM CURRENT USE OF INSULIN (H): ICD-10-CM

## 2024-11-05 PROCEDURE — 71046 X-RAY EXAM CHEST 2 VIEWS: CPT

## 2024-11-05 PROCEDURE — G2211 COMPLEX E/M VISIT ADD ON: HCPCS

## 2024-11-05 PROCEDURE — 99214 OFFICE O/P EST MOD 30 MIN: CPT

## 2024-11-05 RX ORDER — FLUTICASONE PROPIONATE 50 MCG
1 SPRAY, SUSPENSION (ML) NASAL DAILY
Qty: 11.1 ML | Refills: 0 | Status: SHIPPED | OUTPATIENT
Start: 2024-11-05

## 2024-11-05 NOTE — PROGRESS NOTES
"  Assessment & Plan     NSTEMI (non-ST elevated myocardial infarction) (H)  - Adult Cardiology Eval  Referral; Future    Medication management  - Adult Cardiology Eval  Referral; Future    Type 2 diabetes mellitus with stage 2 chronic kidney disease, with long-term current use of insulin (H)    Pneumonia due to infectious organism, unspecified laterality, unspecified part of lung  - XR Chest 2 Views; Future    Dysfunction of both eustachian tubes  - fluticasone (FLONASE) 50 MCG/ACT nasal spray; Spray 1 spray into both nostrils daily.    Nasal congestion      I spent a total of 30 minutes on the day of the visit.   Time spent by me doing chart review, history and exam, documentation and further activities per the note    MED REC REQUIRED  Post Medication Reconciliation Status: discharge medications reconciled and changed, per note/orders  BMI  Estimated body mass index is 27.59 kg/m  as calculated from the following:    Height as of this encounter: 1.753 m (5' 9\").    Weight as of this encounter: 84.7 kg (186 lb 12.8 oz).         See Patient Instructions  Patient Instructions   Assessment  - Recent heart attack with complications preventing stent placement due to kidney concerns.  - Persistent symptoms suggestive of unresolved pneumonia, requiring further evaluation.  - Nasal congestion and ear fullness likely due to fluid buildup, potentially treatable with nasal spray.    Plan  - Order a chest X-ray to assess for persistent pneumonia.  - Prescribe Flonase nasal spray: one spray in each nostril daily to alleviate nasal and ear congestion.  - Recommend over-the-counter Mucinex to help loosen secretions.  - Advise taking Tylenol as needed for fever, avoiding ibuprofen.  - Continue all current medications as directed.  - Follow up with cardiology via phone visit on Thursday.  - Provide instructions and call with X-ray results.  Labs today          Chest xr today, if pneumonia is present, I will send " antibiotics    Flonase nasal spray    OTC musinex    Tylenol as needed for fevers    AVOID ibuprofen    Continue all medications as directed    Follow up with cardiology      Discharge Procedure Orders   Consult Cardiac Rehab   Referral Priority: Routine Referral Type: Consult   Number of Visits Requested: 72 Expiration Date: 11/28/25          Activity I should do when I am home   Order Comments: Avoid activities that will place extra strain on the your groin site for 3 days, such as: jogging, sex, contact sports, and sitting or standing for long periods of time.  Avoid lifting anything heavier than 10 pounds.  You may return to normal activity 5 days after the procedure.  Look at your groin site daily and keep it clean.  Place a new bandage on the site for 3 days or until healed.  No soaking in a tub of water or swimming until the puncture site is healed.  You may shower.  If hemostatic disc is in place do not shower.  Remove disc after 24 hours.          When should I call my provider   Order Comments: If you experience any of the following:   Increased redness  An elevated temperature  Any drainage Increased tenderness or pain     Signs or symptoms of bleeding under your skin:   Pain  Swelling  Tightness at the puncture site  Firm feeling under the skin  Numbness or tingling of your legs or feet     If you notice bleeding from your puncture site:   Lie down  Put your hand over the site and apply pressure for at least 10 minutes  Call or have someone call your doctor.      Provider Follow-Up Specialist      Order Specific Question Answer Comments   When: 1 week     Reason: 1 week telephone call with cardiology RN     With Who? Cardiology RN        Provider Follow-Up Specialist      Order Specific Question Answer Comments   When: 1 month     Reason: NSTEMI follow-up     With Who? Dr. Loyd, Cardiology            Provider Follow-Up  PCP   Order Comments: PCP: Porfirio Burleson, DO      Order Specific  Question Answer Comments   When: Patient has complexity with higher risk of readmission in need of primary care follow up within 6 days (i.e. this is a Transitional Care Management [TCM] visit or heart failure patient in need of high prioritization)     Reason: NSTEMI     Labs: BMP        Where am I going once I leave the hospital      Order Specific Question Answer Comments   Discharge to Home     Discharge to Home Care/Nursing Visit        Home care Physical Therapy evaluate and treat      Order Specific Question Answer Comments   Reason for consult Home care Physical Therapy evaluate and treat        Home care Occupational Therapy evaluate and treat      Order Specific Question Answer Comments   Reason for consult Home care Occupational Therapy evaluate and treat            Reason I was hospitalized   Order Comments: Chest pain concerning for heart attack, possible pneumonia      Where am I going once I leave the hospital      Order Specific Question Answer Comments   Discharge to Home        Diet I should eat when I am home      Order Specific Question Answer Comments   Type of diet I should eat Heart Healthy Diet (2000 mg sodium restriction, low saturated fat, low caffeine, and high in healthy fats, and high in fiber)            Activity I should do when I am home   Order Comments: Regular exercise is encouraged  You may increase daily walking as you are able          When I should call my provider   Order Comments: If you:  Feel you are getting worse or feel you have an increase in problems  Fever greater than 101 degrees or fever greater than 100 over 24 hours  Increased shortness of breath  Any signs of infection (increasing redness, swelling, tenderness, warmth change in appearance, or increased drainage)  New or increased swelling, redness or pain in your feet and or legs  Blood in your urine or stool  Coughing or vomiting blood   Nausea (upset stomach) and vomiting and/or diarrhea that won't stop  Severe  pain that is not relieved by medication, rest or ice  Weight gain - call your physician if you gain 3 pounds or more over night, or gain 5 pounds in a week     Call 911 if you feel you are having a Medical Emergency.          Provider Follow-Up  PCP   Order Comments: PCP: Porfirio Burleson DO      Order Specific Question Answer Comments   When: Patient has complexity with higher risk of readmission in need of primary care follow up within 6 days (i.e. this is a Transitional Care Management [TCM] visit or heart failure patient in need of high prioritization)     Reason: hospitalization follow up        Request Home Care      Order Specific Question Answer Comments   Which Agency [facility] will not pay for homecare without a Face to Face note, this should be documented in a Face to Face encounter with the patient within 30 days of beginning homecare services.  Use the [employer].  Indicate how you will document. Face to Face in a separate note     Discipline requested: Skilled nursing     Discipline requested: Physical Therapy     Discipline requested: Occupational Therapy     Discipline requested: Home Health Aide     Physician to follow: PCP     Home Telemonitoring Needed No           We appreciate the opportunity to care for Pj Zhao. If you have any questions, please do not hesitate to contact us as outlined below.      Post-Discharge Contacts   If you are a healthcare provider and have questions within the first 24 hours after discharge, please contact [facility] at [phone] [phone]; or for Laboratory Services: [phone].     Kind Regards,   Meera Whelan DO    Subjective   Pj is a 83 year old, presenting for the following health issues:  Hospital F/U (Heart attack, Pneumonia)        11/5/2024     2:20 PM   Additional Questions   Roomed by Rush Zhao  The patient recently had a hospital stay due to a heart  attack and pneumonia. They report that their heart is currently okay according to medical staff, but a stent procedure was not performed due to concerns about kidney function. The patient mentions a procedure involving the groin area, with no bleeding or infection noted, and a scabbed-over site. Breathing has been terrible, with significant nasal and ear congestion, described as stuffy and plugged up, making it hard to hear. The patient completed a course of antibiotics for pneumonia, taking one type for three days and another for five days, with the last dose taken this morning. They report no dizziness or lightheadedness but express frustration with the persistent congestion. The patient recalls a previous heart attack years ago, followed by open-heart surgery four years ago. They mention a sore spot, possibly from being bumped, but are unsure of its origin. The patient is not taking ibuprofen and has no other questions about their hospital stay. A follow-up phone visit with a cardiologist is scheduled for Thursday.    Objective  - Physical exam:  - Groin area healed, no signs of infection    - Test results:  - Chest X-ray ordered to assess for pneumonia    Assessment  - Recent heart attack with complications preventing stent placement due to kidney concerns.  - Persistent symptoms suggestive of unresolved pneumonia, requiring further evaluation.  - Nasal congestion and ear fullness likely due to fluid buildup, potentially treatable with nasal spray.    Plan  - Order a chest X-ray to assess for persistent pneumonia.  - Prescribe Flonase nasal spray: one spray in each nostril daily to alleviate nasal and ear congestion.  - Recommend over-the-counter Mucinex to help loosen secretions.  - Advise taking Tylenol as needed for fever, avoiding ibuprofen.  - Continue all current medications as directed.  - Follow up with cardiology via phone visit on Thursday.  - Provide instructions and call with X-ray  "results.                    Objective    /60   Pulse 69   Temp 97.4  F (36.3  C) (Temporal)   Resp 16   Ht 1.753 m (5' 9\")   Wt 84.7 kg (186 lb 12.8 oz)   SpO2 96%   BMI 27.59 kg/m    Body mass index is 27.59 kg/m .  Physical Exam   GENERAL: alert and no distress  EYES: Eyes grossly normal to inspection, PERRL and conjunctivae and sclerae normal  HENT: ear canals and TM's normal, clear TM effusion noted bilaterally nose and mouth without ulcers or lesions  RESP: Crackles noted over the left upper lung field.  Otherwise, lungs clear to auscultation - no rales, rhonchi or wheezes  CV: regular rate and rhythm, normal S1 S2, no S3 or S4, no murmur, click or rub, no peripheral edema  MS: no gross musculoskeletal defects noted, no edema  NEURO: Normal strength and tone, mentation intact and speech normal  PSYCH: mentation appears normal, affect normal/bright    Admission on 10/27/2024, Discharged on 10/27/2024   Component Date Value Ref Range Status    Sodium 10/27/2024 133 (L)  135 - 145 mmol/L Final    Potassium 10/27/2024 5.2  3.4 - 5.3 mmol/L Final    Carbon Dioxide (CO2) 10/27/2024 18 (L)  22 - 29 mmol/L Final    Anion Gap 10/27/2024 17 (H)  7 - 15 mmol/L Final    Urea Nitrogen 10/27/2024 90.9 (H)  8.0 - 23.0 mg/dL Final    Creatinine 10/27/2024 2.83 (H)  0.67 - 1.17 mg/dL Final    GFR Estimate 10/27/2024 21 (L)  >60 mL/min/1.73m2 Final    eGFR calculated using 2021 CKD-EPI equation.    Calcium 10/27/2024 8.9  8.8 - 10.4 mg/dL Final    Reference intervals for this test were updated on 7/16/2024 to reflect our healthy population more accurately. There may be differences in the flagging of prior results with similar values performed with this method. Those prior results can be interpreted in the context of the updated reference intervals.    Chloride 10/27/2024 98  98 - 107 mmol/L Final    Glucose 10/27/2024 206 (H)  70 - 99 mg/dL Final    Alkaline Phosphatase 10/27/2024 88  40 - 150 U/L Final    AST " 10/27/2024 89 (H)  0 - 45 U/L Final    ALT 10/27/2024 58  0 - 70 U/L Final    Protein Total 10/27/2024 7.3  6.4 - 8.3 g/dL Final    Albumin 10/27/2024 3.3 (L)  3.5 - 5.2 g/dL Final    Bilirubin Total 10/27/2024 0.7  <=1.2 mg/dL Final    Lipase 10/27/2024 54  13 - 60 U/L Final    Lactic Acid, Initial 10/27/2024 2.3 (H)  0.7 - 2.0 mmol/L Final    Troponin T, High Sensitivity 10/27/2024 1,528 (HH)  <=22 ng/L Final    Either a High Sensitivity Troponin T baseline (0 hours) value = 100 ng/L, or an increase in High Sensitivity Troponin T = 7 ng/L at 2 hours compared to 0 hours (2-0 hours), suggests myocardial injury, and urgent clinical attention is required.    If the 2-0 hours increase is <7 ng/L, a High Sensitivity Troponin T result above gender-specific reference ranges warrants further evaluation.   Recommendations for further evaluation include correlation with clinical decision-making tool (e.g., HEART), a 3rd High Sensitivity Troponin T test 2 hours after the 2nd (a 20% change from baseline would represent concern), admission for observation, close PCC/cardiology follow-up, or urgent outpatient provocative testing.    WBC Count 10/27/2024 11.6 (H)  4.0 - 11.0 10e3/uL Final    RBC Count 10/27/2024 3.93 (L)  4.40 - 5.90 10e6/uL Final    Hemoglobin 10/27/2024 12.6 (L)  13.3 - 17.7 g/dL Final    Hematocrit 10/27/2024 36.9 (L)  40.0 - 53.0 % Final    MCV 10/27/2024 94  78 - 100 fL Final    MCH 10/27/2024 32.1  26.5 - 33.0 pg Final    MCHC 10/27/2024 34.1  31.5 - 36.5 g/dL Final    RDW 10/27/2024 13.1  10.0 - 15.0 % Final    Platelet Count 10/27/2024 139 (L)  150 - 450 10e3/uL Final    % Neutrophils 10/27/2024 85  % Final    % Lymphocytes 10/27/2024 7  % Final    % Monocytes 10/27/2024 6  % Final    % Eosinophils 10/27/2024 1  % Final    % Basophils 10/27/2024 0  % Final    % Immature Granulocytes 10/27/2024 1  % Final    NRBCs per 100 WBC 10/27/2024 0  <1 /100 Final    Absolute Neutrophils 10/27/2024 9.8 (H)  1.6 - 8.3  10e3/uL Final    Absolute Lymphocytes 10/27/2024 0.8  0.8 - 5.3 10e3/uL Final    Absolute Monocytes 10/27/2024 0.7  0.0 - 1.3 10e3/uL Final    Absolute Eosinophils 10/27/2024 0.1  0.0 - 0.7 10e3/uL Final    Absolute Basophils 10/27/2024 0.0  0.0 - 0.2 10e3/uL Final    Absolute Immature Granulocytes 10/27/2024 0.1  <=0.4 10e3/uL Final    Absolute NRBCs 10/27/2024 0.0  10e3/uL Final    GLUCOSE BY METER POCT 10/27/2024 195 (H)  70 - 99 mg/dL Final    Troponin T, High Sensitivity 10/27/2024 1,423 (HH)  <=22 ng/L Final    Either a High Sensitivity Troponin T baseline (0 hours) value = 100 ng/L, or an increase in High Sensitivity Troponin T = 7 ng/L at 2 hours compared to 0 hours (2-0 hours), suggests myocardial injury, and urgent clinical attention is required.    If the 2-0 hours increase is <7 ng/L, a High Sensitivity Troponin T result above gender-specific reference ranges warrants further evaluation.   Recommendations for further evaluation include correlation with clinical decision-making tool (e.g., HEART), a 3rd High Sensitivity Troponin T test 2 hours after the 2nd (a 20% change from baseline would represent concern), admission for observation, close PCC/cardiology follow-up, or urgent outpatient provocative testing.    CK 10/27/2024 153  39 - 308 U/L Final    Hold Specimen 10/27/2024 JIC   Final    Hold Specimen 10/27/2024 JIC   Final    Hold Specimen 10/27/2024 JIC   Final     No results found for any visits on 11/05/24.  No results found.        Signed Electronically by: Denisse Dallas PA-C

## 2024-11-05 NOTE — TELEPHONE ENCOUNTER
----- Message from Denisse Dallas sent at 11/5/2024  3:48 PM CST -----  Team - please call patient with results. Thank you!    Lungs are clear, there is no pneumonia noted

## 2024-11-05 NOTE — LETTER
November 6, 2024      Pj Zhao  [address] [facility] [address]        Dear ,    We are writing to inform you of your test results.    Lungs are clear, there is no pneumonia noted     Resulted Orders   XR Chest 2 Views    Narrative    XR CHEST 2 VIEWS 11/5/2024 3:02 PM    HISTORY: Pneumonia due to infectious organism, unspecified laterality,  unspecified part of lung    COMPARISON: Chest x-rays, most recently 10/27/2024. CT dated  10/23/2023.    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 The  cardiomediastinal silhouette and pulmonary vasculature are within  normal limits. Aortic calcification. The lungs are clear without  effusion. No pneumothorax.      Impression    IMPRESSION: No acute pulmonary abnormality.    PRESTON MCKINLEY MD         SYSTEM ID:  [id number]       If you have any questions or concerns, please call the clinic at the number listed above.       Sincerely,    Denisse LAYTON

## 2024-11-05 NOTE — PATIENT INSTRUCTIONS
Assessment  - Recent heart attack with complications preventing stent placement due to kidney concerns.  - Persistent symptoms suggestive of unresolved pneumonia, requiring further evaluation.  - Nasal congestion and ear fullness likely due to fluid buildup, potentially treatable with nasal spray.    Plan  - Order a chest X-ray to assess for persistent pneumonia.  - Prescribe Flonase nasal spray: one spray in each nostril daily to alleviate nasal and ear congestion.  - Recommend over-the-counter Mucinex to help loosen secretions.  - Advise taking Tylenol as needed for fever, avoiding ibuprofen.  - Continue all current medications as directed.  - Follow up with cardiology via phone visit on Thursday.  - Provide instructions and call with X-ray results.  Labs today          Chest xr today, if pneumonia is present, I will send antibiotics    Flonase nasal spray    OTC musinex    Tylenol as needed for fevers    AVOID ibuprofen    Continue all medications as directed    Follow up with cardiology      Discharge Procedure Orders   Consult Cardiac Rehab   Referral Priority: Routine Referral Type: Consult   Number of Visits Requested: 72 Expiration Date: 11/28/25          Activity I should do when I am home   Order Comments: Avoid activities that will place extra strain on the your groin site for 3 days, such as: jogging, sex, contact sports, and sitting or standing for long periods of time.  Avoid lifting anything heavier than 10 pounds.  You may return to normal activity 5 days after the procedure.  Look at your groin site daily and keep it clean.  Place a new bandage on the site for 3 days or until healed.  No soaking in a tub of water or swimming until the puncture site is healed.  You may shower.  If hemostatic disc is in place do not shower.  Remove disc after 24 hours.          When should I call my provider   Order Comments: If you experience any of the following:   Increased redness  An elevated temperature  Any  drainage Increased tenderness or pain     Signs or symptoms of bleeding under your skin:   Pain  Swelling  Tightness at the puncture site  Firm feeling under the skin  Numbness or tingling of your legs or feet     If you notice bleeding from your puncture site:   Lie down  Put your hand over the site and apply pressure for at least 10 minutes  Call or have someone call your doctor.      Provider Follow-Up Specialist      Order Specific Question Answer Comments   When: 1 week     Reason: 1 week telephone call with cardiology RN     With Who? Cardiology RN        Provider Follow-Up Specialist      Order Specific Question Answer Comments   When: 1 month     Reason: NSTEMI follow-up     With Who? Dr. Loyd, Cardiology            Provider Follow-Up  PCP   Order Comments: PCP: Porfirio Burleson, DO      Order Specific Question Answer Comments   When: Patient has complexity with higher risk of readmission in need of primary care follow up within 6 days (i.e. this is a Transitional Care Management [TCM] visit or heart failure patient in need of high prioritization)     Reason: NSTEMI     Labs: BMP        Where am I going once I leave the hospital      Order Specific Question Answer Comments   Discharge to Home     Discharge to Home Care/Nursing Visit        Home care Physical Therapy evaluate and treat      Order Specific Question Answer Comments   Reason for consult Home care Physical Therapy evaluate and treat        Home care Occupational Therapy evaluate and treat      Order Specific Question Answer Comments   Reason for consult Home care Occupational Therapy evaluate and treat            Reason I was hospitalized   Order Comments: Chest pain concerning for heart attack, possible pneumonia      Where am I going once I leave the hospital      Order Specific Question Answer Comments   Discharge to Home        Diet I should eat when I am home      Order Specific Question Answer Comments   Type of diet I should  eat Heart Healthy Diet (2000 mg sodium restriction, low saturated fat, low caffeine, and high in healthy fats, and high in fiber)            Activity I should do when I am home   Order Comments: Regular exercise is encouraged  You may increase daily walking as you are able          When I should call my provider   Order Comments: If you:  Feel you are getting worse or feel you have an increase in problems  Fever greater than 101 degrees or fever greater than 100 over 24 hours  Increased shortness of breath  Any signs of infection (increasing redness, swelling, tenderness, warmth change in appearance, or increased drainage)  New or increased swelling, redness or pain in your feet and or legs  Blood in your urine or stool  Coughing or vomiting blood   Nausea (upset stomach) and vomiting and/or diarrhea that won't stop  Severe pain that is not relieved by medication, rest or ice  Weight gain - call your physician if you gain 3 pounds or more over night, or gain 5 pounds in a week     Call 911 if you feel you are having a Medical Emergency.          Provider Follow-Up  PCP   Order Comments: PCP: Porfirio Burleson, DO      Order Specific Question Answer Comments   When: Patient has complexity with higher risk of readmission in need of primary care follow up within 6 days (i.e. this is a Transitional Care Management [TCM] visit or heart failure patient in need of high prioritization)     Reason: hospitalization follow up        Request Home Care      Order Specific Question Answer Comments   Which Agency [facility] will not pay for homecare without a Face to Face note, this should be documented in a Face to Face encounter with the patient within 30 days of beginning homecare services.  Use the smartlink [employer]FACETOFACE.  Indicate how you will document. Face to Face in a separate note     Discipline requested: Skilled nursing     Discipline requested: Physical Therapy     Discipline requested:  Occupational Therapy     Discipline requested: Home Health Aide     Physician to follow: PCP     Home Telemonitoring Needed No           We appreciate the opportunity to care for Pj Zhao. If you have any questions, please do not hesitate to contact us as outlined below.      Post-Discharge Contacts   If you are a healthcare provider and have questions within the first 24 hours after discharge, please contact [facility] Adult Hospitalist Service at [phone] ext. [phone]; or for Laboratory Services: [phone].     Kind Regards,   Meera Whelan DO

## 2024-11-06 ENCOUNTER — TELEPHONE (OUTPATIENT)
Dept: INTERNAL MEDICINE | Facility: CLINIC | Age: 84
End: 2024-11-06
Payer: COMMERCIAL

## 2024-11-06 NOTE — TELEPHONE ENCOUNTER
Patient's wife, Corinne, calling in [facility] verified. Calling in to see if medication was sent in following yesterday's visit for possible pneumonia. Advised of results that there was no pneumonia present and that no further prescriptions were sent in. She does report that patient has picked up the Flonase and has started using it.    Had no further questions at this time.     Camille Neil, RN, BSN

## 2024-11-06 NOTE — TELEPHONE ENCOUNTER
"Patient called in to see if \"pneumonia medicine\" was sent in from yesterday's visit. See telephone encounter 11/6. Relayed results message per provider below. She verbalized understanding that no prescription was sent for pneumonia.     Camille Neil, RN, BSN    "

## 2024-11-06 NOTE — TELEPHONE ENCOUNTER
Mailed the patient the results.  [facility] to give the patient the message.    Nadege Philip LPN

## 2024-11-16 DIAGNOSIS — E11.8 TYPE 2 DIABETES MELLITUS WITH COMPLICATION (H): ICD-10-CM

## 2024-11-18 RX ORDER — PEN NEEDLE, DIABETIC 31 GX5/16"
NEEDLE, DISPOSABLE MISCELLANEOUS
Qty: 100 EACH | Refills: 0 | Status: SHIPPED | OUTPATIENT
Start: 2024-11-18

## 2024-11-19 ENCOUNTER — TELEPHONE (OUTPATIENT)
Dept: INTERNAL MEDICINE | Facility: CLINIC | Age: 84
End: 2024-11-19
Payer: COMMERCIAL

## 2024-11-19 NOTE — TELEPHONE ENCOUNTER
Reason for Call:  Form, our goal is to have forms completed with 72 hours, however, some forms may require a visit or additional information.    Type of letter, form or note:  Home Health Certification    Who is the form from?: Home care    Where did the form come from: form was faxed in    What clinic location was the form placed at?: [facility]    Where the form was placed: Given to physician    What number is listed as a contact on the form?: [phone]       Additional comments: [place]    Call taken on 11/19/2024 at 5:47 PM by Madhavi Davenport

## 2024-12-07 DIAGNOSIS — E11.22 TYPE 2 DIABETES MELLITUS WITH STAGE 2 CHRONIC KIDNEY DISEASE, WITH LONG-TERM CURRENT USE OF INSULIN (H): ICD-10-CM

## 2024-12-07 DIAGNOSIS — N18.2 TYPE 2 DIABETES MELLITUS WITH STAGE 2 CHRONIC KIDNEY DISEASE, WITH LONG-TERM CURRENT USE OF INSULIN (H): ICD-10-CM

## 2024-12-07 DIAGNOSIS — Z79.4 TYPE 2 DIABETES MELLITUS WITH STAGE 2 CHRONIC KIDNEY DISEASE, WITH LONG-TERM CURRENT USE OF INSULIN (H): ICD-10-CM

## 2024-12-21 DIAGNOSIS — R60.1 GENERALIZED EDEMA: ICD-10-CM

## 2024-12-23 RX ORDER — FUROSEMIDE 20 MG/1
20 TABLET ORAL DAILY
Qty: 90 TABLET | Refills: 0 | Status: SHIPPED | OUTPATIENT
Start: 2024-12-23

## 2025-01-02 DIAGNOSIS — I25.118 ATHEROSCLEROSIS OF NATIVE CORONARY ARTERY OF NATIVE HEART WITH STABLE ANGINA PECTORIS: Primary | ICD-10-CM

## 2025-01-02 DIAGNOSIS — E11.8 TYPE 2 DIABETES MELLITUS WITH COMPLICATION (H): ICD-10-CM

## 2025-01-02 DIAGNOSIS — R60.1 GENERALIZED EDEMA: ICD-10-CM

## 2025-01-02 RX ORDER — POTASSIUM CHLORIDE 750 MG/1
10 TABLET, EXTENDED RELEASE ORAL DAILY
Qty: 90 TABLET | Refills: 3 | Status: SHIPPED | OUTPATIENT
Start: 2025-01-02

## 2025-01-02 RX ORDER — LISINOPRIL 20 MG/1
20 TABLET ORAL DAILY
Qty: 90 TABLET | Refills: 1 | Status: SHIPPED | OUTPATIENT
Start: 2025-01-02

## 2025-01-02 NOTE — TELEPHONE ENCOUNTER
Clinic RN: Please investigate patient's chart or contact patient if the information cannot be found because the medication is listed as historical or discontinued. Confirm patient is taking this medication. Document findings and route refill encounter to provider for approval or denial.    Latoya Frazier, CHICON, RN

## 2025-01-06 RX ORDER — NITROGLYCERIN 0.4 MG/1
TABLET SUBLINGUAL
Qty: 25 TABLET | Refills: 0 | Status: SHIPPED | OUTPATIENT
Start: 2025-01-06

## 2025-01-06 NOTE — TELEPHONE ENCOUNTER
"Attempted to call patient to gather information on request as this medication is listed as discontinued. No answer.     It appears this medication was discontinued by PCP on 10/18/24, reason \"none\", within Orders Only encounter. Discontinued med shows patient reported \"not taking\" on 10/8/24 during office visit.     Routing to PCP for review.     CHICO RangelN, RN      "

## 2025-01-07 DIAGNOSIS — Z79.4 TYPE 2 DIABETES MELLITUS WITH STAGE 2 CHRONIC KIDNEY DISEASE, WITH LONG-TERM CURRENT USE OF INSULIN (H): ICD-10-CM

## 2025-01-07 DIAGNOSIS — N18.2 TYPE 2 DIABETES MELLITUS WITH STAGE 2 CHRONIC KIDNEY DISEASE, WITH LONG-TERM CURRENT USE OF INSULIN (H): ICD-10-CM

## 2025-01-07 DIAGNOSIS — E11.22 TYPE 2 DIABETES MELLITUS WITH STAGE 2 CHRONIC KIDNEY DISEASE, WITH LONG-TERM CURRENT USE OF INSULIN (H): ICD-10-CM

## 2025-01-08 RX ORDER — GABAPENTIN 300 MG/1
600 CAPSULE ORAL AT BEDTIME
Qty: 180 CAPSULE | Refills: 3 | Status: SHIPPED | OUTPATIENT
Start: 2025-01-08

## 2025-01-14 DIAGNOSIS — H81.13 BENIGN PAROXYSMAL POSITIONAL VERTIGO DUE TO BILATERAL VESTIBULAR DISORDER: ICD-10-CM

## 2025-01-14 RX ORDER — MECLIZINE HYDROCHLORIDE 25 MG/1
25 TABLET ORAL 3 TIMES DAILY PRN
Qty: 90 TABLET | Refills: 3 | Status: SHIPPED | OUTPATIENT
Start: 2025-01-14

## 2025-02-12 DIAGNOSIS — E11.8 TYPE 2 DIABETES MELLITUS WITH COMPLICATION (H): ICD-10-CM

## 2025-02-12 RX ORDER — PEN NEEDLE, DIABETIC 31 GX5/16"
NEEDLE, DISPOSABLE MISCELLANEOUS
Qty: 100 EACH | Refills: 2 | Status: SHIPPED | OUTPATIENT
Start: 2025-02-12

## 2025-02-18 DIAGNOSIS — N18.2 TYPE 2 DIABETES MELLITUS WITH STAGE 2 CHRONIC KIDNEY DISEASE, WITH LONG-TERM CURRENT USE OF INSULIN (H): ICD-10-CM

## 2025-02-18 DIAGNOSIS — E11.22 TYPE 2 DIABETES MELLITUS WITH STAGE 2 CHRONIC KIDNEY DISEASE, WITH LONG-TERM CURRENT USE OF INSULIN (H): ICD-10-CM

## 2025-02-18 DIAGNOSIS — Z79.4 TYPE 2 DIABETES MELLITUS WITH STAGE 2 CHRONIC KIDNEY DISEASE, WITH LONG-TERM CURRENT USE OF INSULIN (H): ICD-10-CM

## 2025-02-18 RX ORDER — INSULIN GLARGINE 100 [IU]/ML
INJECTION, SOLUTION SUBCUTANEOUS
Qty: 30 ML | Refills: 1 | Status: SHIPPED | OUTPATIENT
Start: 2025-02-18

## 2025-03-19 DIAGNOSIS — N18.2 TYPE 2 DIABETES MELLITUS WITH STAGE 2 CHRONIC KIDNEY DISEASE, WITH LONG-TERM CURRENT USE OF INSULIN (H): ICD-10-CM

## 2025-03-19 DIAGNOSIS — E11.22 TYPE 2 DIABETES MELLITUS WITH STAGE 2 CHRONIC KIDNEY DISEASE, WITH LONG-TERM CURRENT USE OF INSULIN (H): ICD-10-CM

## 2025-03-19 DIAGNOSIS — Z79.4 TYPE 2 DIABETES MELLITUS WITH STAGE 2 CHRONIC KIDNEY DISEASE, WITH LONG-TERM CURRENT USE OF INSULIN (H): ICD-10-CM

## 2025-03-19 RX ORDER — BLOOD SUGAR DIAGNOSTIC
STRIP MISCELLANEOUS
Qty: 400 STRIP | Refills: 0 | Status: SHIPPED | OUTPATIENT
Start: 2025-03-19

## 2025-04-07 DIAGNOSIS — R60.1 GENERALIZED EDEMA: ICD-10-CM

## 2025-04-07 RX ORDER — FUROSEMIDE 20 MG/1
20 TABLET ORAL DAILY
Qty: 90 TABLET | Refills: 3 | Status: SHIPPED | OUTPATIENT
Start: 2025-04-07

## 2025-05-05 ENCOUNTER — TRANSFERRED RECORDS (OUTPATIENT)
Dept: HEALTH INFORMATION MANAGEMENT | Facility: CLINIC | Age: 85
End: 2025-05-05
Payer: COMMERCIAL

## 2025-06-25 DIAGNOSIS — E11.8 TYPE 2 DIABETES MELLITUS WITH COMPLICATION (H): ICD-10-CM

## 2025-06-25 RX ORDER — LISINOPRIL 20 MG/1
20 TABLET ORAL DAILY
Qty: 90 TABLET | Refills: 1 | Status: SHIPPED | OUTPATIENT
Start: 2025-06-25

## 2025-09-04 ENCOUNTER — TELEPHONE (OUTPATIENT)
Dept: PHARMACY | Facility: OTHER | Age: 85
End: 2025-09-04
Payer: COMMERCIAL